# Patient Record
Sex: FEMALE | Race: OTHER | HISPANIC OR LATINO
[De-identification: names, ages, dates, MRNs, and addresses within clinical notes are randomized per-mention and may not be internally consistent; named-entity substitution may affect disease eponyms.]

---

## 2017-01-18 ENCOUNTER — APPOINTMENT (OUTPATIENT)
Dept: ORTHOPEDIC SURGERY | Facility: CLINIC | Age: 52
End: 2017-01-18

## 2017-01-18 VITALS — WEIGHT: 138 LBS | BODY MASS INDEX: 25.4 KG/M2 | HEIGHT: 62 IN

## 2017-01-24 ENCOUNTER — APPOINTMENT (OUTPATIENT)
Dept: ORTHOPEDIC SURGERY | Facility: CLINIC | Age: 52
End: 2017-01-24

## 2017-01-24 DIAGNOSIS — M23.92 UNSPECIFIED INTERNAL DERANGEMENT OF LEFT KNEE: ICD-10-CM

## 2017-01-27 ENCOUNTER — FORM ENCOUNTER (OUTPATIENT)
Age: 52
End: 2017-01-27

## 2017-01-28 ENCOUNTER — OUTPATIENT (OUTPATIENT)
Dept: OUTPATIENT SERVICES | Facility: HOSPITAL | Age: 52
LOS: 1 days | End: 2017-01-28
Payer: COMMERCIAL

## 2017-01-28 ENCOUNTER — APPOINTMENT (OUTPATIENT)
Dept: MRI IMAGING | Facility: CLINIC | Age: 52
End: 2017-01-28

## 2017-01-28 DIAGNOSIS — Z00.8 ENCOUNTER FOR OTHER GENERAL EXAMINATION: ICD-10-CM

## 2017-01-28 PROCEDURE — 73721 MRI JNT OF LWR EXTRE W/O DYE: CPT

## 2017-02-10 ENCOUNTER — APPOINTMENT (OUTPATIENT)
Dept: ORTHOPEDIC SURGERY | Facility: CLINIC | Age: 52
End: 2017-02-10

## 2017-03-08 ENCOUNTER — APPOINTMENT (OUTPATIENT)
Dept: NEUROLOGY | Facility: CLINIC | Age: 52
End: 2017-03-08

## 2017-03-20 ENCOUNTER — APPOINTMENT (OUTPATIENT)
Dept: ORTHOPEDIC SURGERY | Facility: CLINIC | Age: 52
End: 2017-03-20

## 2017-04-05 ENCOUNTER — APPOINTMENT (OUTPATIENT)
Dept: ORTHOPEDIC SURGERY | Facility: CLINIC | Age: 52
End: 2017-04-05

## 2017-04-28 ENCOUNTER — APPOINTMENT (OUTPATIENT)
Dept: ORTHOPEDIC SURGERY | Facility: CLINIC | Age: 52
End: 2017-04-28

## 2017-05-05 ENCOUNTER — APPOINTMENT (OUTPATIENT)
Dept: ORTHOPEDIC SURGERY | Facility: CLINIC | Age: 52
End: 2017-05-05

## 2017-05-05 VITALS
WEIGHT: 138 LBS | BODY MASS INDEX: 25.4 KG/M2 | DIASTOLIC BLOOD PRESSURE: 87 MMHG | HEIGHT: 62 IN | SYSTOLIC BLOOD PRESSURE: 137 MMHG | HEART RATE: 87 BPM

## 2017-05-11 ENCOUNTER — APPOINTMENT (OUTPATIENT)
Dept: ORTHOPEDIC SURGERY | Facility: CLINIC | Age: 52
End: 2017-05-11

## 2017-05-11 VITALS
BODY MASS INDEX: 25.4 KG/M2 | SYSTOLIC BLOOD PRESSURE: 135 MMHG | DIASTOLIC BLOOD PRESSURE: 82 MMHG | HEIGHT: 62 IN | WEIGHT: 138 LBS

## 2017-05-15 ENCOUNTER — APPOINTMENT (OUTPATIENT)
Dept: ORTHOPEDIC SURGERY | Facility: CLINIC | Age: 52
End: 2017-05-15

## 2017-05-15 ENCOUNTER — RX RENEWAL (OUTPATIENT)
Age: 52
End: 2017-05-15

## 2017-05-19 ENCOUNTER — OUTPATIENT (OUTPATIENT)
Dept: OUTPATIENT SERVICES | Facility: HOSPITAL | Age: 52
LOS: 1 days | End: 2017-05-19
Payer: COMMERCIAL

## 2017-05-19 DIAGNOSIS — Z51.89 ENCOUNTER FOR OTHER SPECIFIED AFTERCARE: ICD-10-CM

## 2017-05-19 DIAGNOSIS — M75.81 OTHER SHOULDER LESIONS, RIGHT SHOULDER: ICD-10-CM

## 2017-07-12 ENCOUNTER — APPOINTMENT (OUTPATIENT)
Dept: MAMMOGRAPHY | Facility: CLINIC | Age: 52
End: 2017-07-12

## 2017-07-12 ENCOUNTER — OUTPATIENT (OUTPATIENT)
Dept: OUTPATIENT SERVICES | Facility: HOSPITAL | Age: 52
LOS: 1 days | End: 2017-07-12
Payer: COMMERCIAL

## 2017-07-12 ENCOUNTER — RESULT REVIEW (OUTPATIENT)
Age: 52
End: 2017-07-12

## 2017-07-12 DIAGNOSIS — Z00.8 ENCOUNTER FOR OTHER GENERAL EXAMINATION: ICD-10-CM

## 2017-07-12 PROCEDURE — 77065 DX MAMMO INCL CAD UNI: CPT

## 2017-07-12 PROCEDURE — G0279: CPT

## 2017-07-12 PROCEDURE — 19081 BX BREAST 1ST LESION STRTCTC: CPT

## 2017-07-20 PROCEDURE — 97010 HOT OR COLD PACKS THERAPY: CPT

## 2017-07-20 PROCEDURE — 97140 MANUAL THERAPY 1/> REGIONS: CPT

## 2017-07-20 PROCEDURE — 97750 PHYSICAL PERFORMANCE TEST: CPT

## 2017-07-20 PROCEDURE — 97110 THERAPEUTIC EXERCISES: CPT

## 2018-04-10 ENCOUNTER — APPOINTMENT (OUTPATIENT)
Dept: ORTHOPEDIC SURGERY | Facility: CLINIC | Age: 53
End: 2018-04-10
Payer: COMMERCIAL

## 2018-04-10 VITALS
DIASTOLIC BLOOD PRESSURE: 83 MMHG | HEIGHT: 62 IN | HEART RATE: 80 BPM | SYSTOLIC BLOOD PRESSURE: 136 MMHG | WEIGHT: 138 LBS | BODY MASS INDEX: 25.4 KG/M2

## 2018-04-10 DIAGNOSIS — M75.81 OTHER SHOULDER LESIONS, RIGHT SHOULDER: ICD-10-CM

## 2018-04-10 PROCEDURE — 20610 DRAIN/INJ JOINT/BURSA W/O US: CPT | Mod: RT

## 2018-04-10 PROCEDURE — 99213 OFFICE O/P EST LOW 20 MIN: CPT | Mod: 25

## 2018-04-13 ENCOUNTER — APPOINTMENT (OUTPATIENT)
Dept: ORTHOPEDIC SURGERY | Facility: CLINIC | Age: 53
End: 2018-04-13
Payer: COMMERCIAL

## 2018-04-13 PROCEDURE — 99214 OFFICE O/P EST MOD 30 MIN: CPT

## 2018-04-27 ENCOUNTER — APPOINTMENT (OUTPATIENT)
Dept: MAMMOGRAPHY | Facility: CLINIC | Age: 53
End: 2018-04-27
Payer: COMMERCIAL

## 2018-04-27 ENCOUNTER — OUTPATIENT (OUTPATIENT)
Dept: OUTPATIENT SERVICES | Facility: HOSPITAL | Age: 53
LOS: 1 days | End: 2018-04-27
Payer: COMMERCIAL

## 2018-04-27 DIAGNOSIS — Z00.8 ENCOUNTER FOR OTHER GENERAL EXAMINATION: ICD-10-CM

## 2018-04-27 PROCEDURE — G0279: CPT | Mod: 26

## 2018-04-27 PROCEDURE — 76642 ULTRASOUND BREAST LIMITED: CPT

## 2018-04-27 PROCEDURE — 77065 DX MAMMO INCL CAD UNI: CPT

## 2018-04-27 PROCEDURE — 76642 ULTRASOUND BREAST LIMITED: CPT | Mod: 26,RT

## 2018-04-27 PROCEDURE — 77065 DX MAMMO INCL CAD UNI: CPT | Mod: 26,RT

## 2018-04-27 PROCEDURE — G0279: CPT

## 2018-05-21 ENCOUNTER — OUTPATIENT (OUTPATIENT)
Dept: OUTPATIENT SERVICES | Facility: HOSPITAL | Age: 53
LOS: 1 days | End: 2018-05-21
Payer: COMMERCIAL

## 2018-05-21 ENCOUNTER — RESULT REVIEW (OUTPATIENT)
Age: 53
End: 2018-05-21

## 2018-05-21 ENCOUNTER — APPOINTMENT (OUTPATIENT)
Dept: ULTRASOUND IMAGING | Facility: CLINIC | Age: 53
End: 2018-05-21
Payer: COMMERCIAL

## 2018-05-21 DIAGNOSIS — Z00.8 ENCOUNTER FOR OTHER GENERAL EXAMINATION: ICD-10-CM

## 2018-05-21 PROCEDURE — 77065 DX MAMMO INCL CAD UNI: CPT

## 2018-05-21 PROCEDURE — 19083 BX BREAST 1ST LESION US IMAG: CPT

## 2018-05-21 PROCEDURE — 77065 DX MAMMO INCL CAD UNI: CPT | Mod: 26,RT

## 2018-05-21 PROCEDURE — 19083 BX BREAST 1ST LESION US IMAG: CPT | Mod: RT

## 2018-05-29 ENCOUNTER — APPOINTMENT (OUTPATIENT)
Dept: GASTROENTEROLOGY | Facility: CLINIC | Age: 53
End: 2018-05-29
Payer: COMMERCIAL

## 2018-05-29 VITALS
BODY MASS INDEX: 26.13 KG/M2 | WEIGHT: 142 LBS | DIASTOLIC BLOOD PRESSURE: 82 MMHG | SYSTOLIC BLOOD PRESSURE: 123 MMHG | HEART RATE: 80 BPM | HEIGHT: 62 IN

## 2018-05-29 DIAGNOSIS — Z82.5 FAMILY HISTORY OF ASTHMA AND OTHER CHRONIC LOWER RESPIRATORY DISEASES: ICD-10-CM

## 2018-05-29 PROCEDURE — 99204 OFFICE O/P NEW MOD 45 MIN: CPT

## 2018-05-29 PROCEDURE — 82272 OCCULT BLD FECES 1-3 TESTS: CPT

## 2018-07-10 ENCOUNTER — APPOINTMENT (OUTPATIENT)
Dept: SURGERY | Facility: CLINIC | Age: 53
End: 2018-07-10
Payer: MEDICARE

## 2018-07-10 VITALS
TEMPERATURE: 99 F | SYSTOLIC BLOOD PRESSURE: 126 MMHG | HEIGHT: 62 IN | BODY MASS INDEX: 27.23 KG/M2 | OXYGEN SATURATION: 95 % | HEART RATE: 97 BPM | WEIGHT: 148 LBS | DIASTOLIC BLOOD PRESSURE: 81 MMHG

## 2018-07-10 DIAGNOSIS — N63.10 UNSPECIFIED LUMP IN THE RIGHT BREAST, UNSPECIFIED QUADRANT: ICD-10-CM

## 2018-07-10 PROCEDURE — 99204 OFFICE O/P NEW MOD 45 MIN: CPT

## 2018-07-13 PROBLEM — N63.10 MASS OF BREAST, RIGHT: Status: ACTIVE | Noted: 2018-07-13

## 2018-07-26 ENCOUNTER — RX RENEWAL (OUTPATIENT)
Age: 53
End: 2018-07-26

## 2018-08-09 ENCOUNTER — APPOINTMENT (OUTPATIENT)
Dept: GASTROENTEROLOGY | Facility: GI CENTER | Age: 53
End: 2018-08-09
Payer: MEDICARE

## 2018-08-09 ENCOUNTER — OUTPATIENT (OUTPATIENT)
Dept: OUTPATIENT SERVICES | Facility: HOSPITAL | Age: 53
LOS: 1 days | End: 2018-08-09
Payer: MEDICARE

## 2018-08-09 VITALS
HEIGHT: 62 IN | TEMPERATURE: 98 F | WEIGHT: 147 LBS | DIASTOLIC BLOOD PRESSURE: 88 MMHG | RESPIRATION RATE: 12 BRPM | BODY MASS INDEX: 27.05 KG/M2 | HEART RATE: 100 BPM | SYSTOLIC BLOOD PRESSURE: 128 MMHG

## 2018-08-09 DIAGNOSIS — R10.32 LEFT LOWER QUADRANT PAIN: ICD-10-CM

## 2018-08-09 DIAGNOSIS — K64.8 OTHER HEMORRHOIDS: ICD-10-CM

## 2018-08-09 DIAGNOSIS — G89.29 LEFT LOWER QUADRANT PAIN: ICD-10-CM

## 2018-08-09 PROCEDURE — 45378 DIAGNOSTIC COLONOSCOPY: CPT

## 2018-08-09 PROCEDURE — T1013: CPT

## 2018-10-22 ENCOUNTER — APPOINTMENT (OUTPATIENT)
Dept: GASTROENTEROLOGY | Facility: GI CENTER | Age: 53
End: 2018-10-22
Payer: MEDICARE

## 2018-10-22 ENCOUNTER — RESULT REVIEW (OUTPATIENT)
Age: 53
End: 2018-10-22

## 2018-10-22 ENCOUNTER — OUTPATIENT (OUTPATIENT)
Dept: OUTPATIENT SERVICES | Facility: HOSPITAL | Age: 53
LOS: 1 days | End: 2018-10-22
Payer: MEDICARE

## 2018-10-22 VITALS
DIASTOLIC BLOOD PRESSURE: 80 MMHG | WEIGHT: 147 LBS | TEMPERATURE: 98 F | HEART RATE: 100 BPM | SYSTOLIC BLOOD PRESSURE: 130 MMHG | HEIGHT: 62 IN | BODY MASS INDEX: 27.05 KG/M2 | RESPIRATION RATE: 12 BRPM

## 2018-10-22 DIAGNOSIS — K29.00 ACUTE GASTRITIS W/OUT BLEEDING: ICD-10-CM

## 2018-10-22 DIAGNOSIS — K21.9 GASTRO-ESOPHAGEAL REFLUX DISEASE WITHOUT ESOPHAGITIS: ICD-10-CM

## 2018-10-22 PROCEDURE — 88305 TISSUE EXAM BY PATHOLOGIST: CPT

## 2018-10-22 PROCEDURE — 43239 EGD BIOPSY SINGLE/MULTIPLE: CPT

## 2018-10-22 PROCEDURE — 88342 IMHCHEM/IMCYTCHM 1ST ANTB: CPT

## 2018-10-22 PROCEDURE — 88305 TISSUE EXAM BY PATHOLOGIST: CPT | Mod: 26

## 2018-10-22 PROCEDURE — 88342 IMHCHEM/IMCYTCHM 1ST ANTB: CPT | Mod: 26

## 2018-10-22 RX ORDER — POLYETHYLENE GLYOCOL 3350, SODIUM CHLORIDE, SODIUM BICARBONATE AND POTASSIUM CHLORIDE 420; 11.2; 5.72; 1.48 G/4L; G/4L; G/4L; G/4L
420 POWDER, FOR SOLUTION NASOGASTRIC; ORAL
Qty: 1 | Refills: 0 | Status: DISCONTINUED | COMMUNITY
Start: 2018-05-29 | End: 2018-10-22

## 2018-10-25 LAB — SURGICAL PATHOLOGY FINAL REPORT - CH: SIGNIFICANT CHANGE UP

## 2020-11-18 ENCOUNTER — APPOINTMENT (OUTPATIENT)
Dept: PULMONOLOGY | Facility: CLINIC | Age: 55
End: 2020-11-18
Payer: MEDICARE

## 2020-11-18 VITALS
DIASTOLIC BLOOD PRESSURE: 80 MMHG | HEIGHT: 62 IN | HEART RATE: 87 BPM | OXYGEN SATURATION: 90 % | SYSTOLIC BLOOD PRESSURE: 140 MMHG | WEIGHT: 143 LBS | BODY MASS INDEX: 26.31 KG/M2

## 2020-11-18 VITALS — OXYGEN SATURATION: 96 %

## 2020-11-18 DIAGNOSIS — Z87.891 PERSONAL HISTORY OF NICOTINE DEPENDENCE: ICD-10-CM

## 2020-11-18 DIAGNOSIS — Z82.49 FAMILY HISTORY OF ISCHEMIC HEART DISEASE AND OTHER DISEASES OF THE CIRCULATORY SYSTEM: ICD-10-CM

## 2020-11-18 PROCEDURE — 99204 OFFICE O/P NEW MOD 45 MIN: CPT

## 2020-11-18 NOTE — PHYSICAL EXAM
[No Acute Distress] : no acute distress [Normal Oropharynx] : normal oropharynx [Normal Appearance] : normal appearance [No Neck Mass] : no neck mass [Normal Rate/Rhythm] : normal rate/rhythm [Normal S1, S2] : normal s1, s2 [No Murmurs] : no murmurs [No Resp Distress] : no resp distress [No Acc Muscle Use] : no acc muscle use [Normal Rhythm and Effort] : normal rhythm and effort [Clear to Auscultation Bilaterally] : clear to auscultation bilaterally [No Abnormalities] : no abnormalities [Benign] : benign [Normal Gait] : normal gait [No Clubbing] : no clubbing [No Cyanosis] : no cyanosis [No Edema] : no edema [FROM] : FROM [Normal Color/ Pigmentation] : normal color/ pigmentation [No Focal Deficits] : no focal deficits [Oriented x3] : oriented x3 [Normal Affect] : normal affect

## 2020-11-18 NOTE — CONSULT LETTER
[Dear  ___] : Dear  [unfilled], [Consult Letter:] : I had the pleasure of evaluating your patient, [unfilled]. [Please see my note below.] : Please see my note below. [Consult Closing:] : Thank you very much for allowing me to participate in the care of this patient.  If you have any questions, please do not hesitate to contact me. [Sincerely,] : Sincerely, [FreeTextEntry3] : Rivera Juan MD\par

## 2020-11-19 ENCOUNTER — APPOINTMENT (OUTPATIENT)
Dept: RADIOLOGY | Facility: CLINIC | Age: 55
End: 2020-11-19
Payer: MEDICARE

## 2020-11-19 ENCOUNTER — OUTPATIENT (OUTPATIENT)
Dept: OUTPATIENT SERVICES | Facility: HOSPITAL | Age: 55
LOS: 1 days | End: 2020-11-19
Payer: SELF-PAY

## 2020-11-19 DIAGNOSIS — Z00.8 ENCOUNTER FOR OTHER GENERAL EXAMINATION: ICD-10-CM

## 2020-11-19 DIAGNOSIS — R06.00 DYSPNEA, UNSPECIFIED: ICD-10-CM

## 2020-11-19 PROCEDURE — 71046 X-RAY EXAM CHEST 2 VIEWS: CPT | Mod: 26

## 2020-11-19 PROCEDURE — 71046 X-RAY EXAM CHEST 2 VIEWS: CPT

## 2020-12-26 ENCOUNTER — APPOINTMENT (OUTPATIENT)
Dept: DISASTER EMERGENCY | Facility: CLINIC | Age: 55
End: 2020-12-26

## 2020-12-29 ENCOUNTER — APPOINTMENT (OUTPATIENT)
Dept: PULMONOLOGY | Facility: CLINIC | Age: 55
End: 2020-12-29
Payer: MEDICARE

## 2020-12-29 VITALS — OXYGEN SATURATION: 93 % | HEART RATE: 65 BPM | SYSTOLIC BLOOD PRESSURE: 140 MMHG | DIASTOLIC BLOOD PRESSURE: 79 MMHG

## 2020-12-29 VITALS — HEIGHT: 61 IN | BODY MASS INDEX: 26.06 KG/M2 | WEIGHT: 138 LBS

## 2020-12-29 LAB — SARS-COV-2 N GENE NPH QL NAA+PROBE: NOT DETECTED

## 2020-12-29 PROCEDURE — 85018 HEMOGLOBIN: CPT | Mod: QW

## 2020-12-29 PROCEDURE — 99214 OFFICE O/P EST MOD 30 MIN: CPT | Mod: 25

## 2020-12-29 PROCEDURE — 94727 GAS DIL/WSHOT DETER LNG VOL: CPT

## 2020-12-29 PROCEDURE — 99072 ADDL SUPL MATRL&STAF TM PHE: CPT

## 2020-12-29 PROCEDURE — 94729 DIFFUSING CAPACITY: CPT

## 2020-12-29 PROCEDURE — 94010 BREATHING CAPACITY TEST: CPT

## 2020-12-29 RX ORDER — CEFUROXIME AXETIL 500 MG/1
500 TABLET ORAL
Qty: 14 | Refills: 0 | Status: DISCONTINUED | COMMUNITY
Start: 2020-11-19 | End: 2020-12-29

## 2020-12-29 NOTE — REASON FOR VISIT
[Follow-Up] : a follow-up visit [Abnormal CXR/ Chest CT] : an abnormal CXR/ chest CT [Cough] : cough [Shortness of Breath] : shortness of breath

## 2021-01-13 LAB
ACE BLD-CCNC: 75 U/L
ANA PAT FLD IF-IMP: ABNORMAL
ANA SER IF-ACNC: ABNORMAL
CRP SERPL-MCNC: 0.3 MG/DL
ERYTHROCYTE [SEDIMENTATION RATE] IN BLOOD BY WESTERGREN METHOD: 42 MM/HR
RHEUMATOID FACT SER QL: 73 IU/ML
SARS-COV-2 IGG SERPL IA-ACNC: <0.1 INDEX
SARS-COV-2 IGG SERPL QL IA: NEGATIVE

## 2021-01-16 LAB
ALTERN TENCAPG(M6): 15.5 MCG/ML
ANCA AB SER-IMP: ABNORMAL
ASPER FUMCAPG(M3): 40.1 MCG/ML
AUREOBASCAPG(M12): 26.4 MCG/ML
C-ANCA SER-ACNC: NEGATIVE
ENA JO1 AB SER IA-ACNC: <0.2 AL
MICROPOLYCAPG(M22): 4.1 MCG/ML
P-ANCA TITR SER IF: NEGATIVE
PENIC CHRYCAPG(M1): 51.7 MCG/ML
PHOMA BETAE IGG: 9.3 MCG/ML
THERMOCAPG(M23): 13.9 MCG/ML
TOTAL IGE SMQN RAST: 212 KU/L
TRICHODERMA VIRIDE IGG: 7.9 MCG/ML

## 2021-01-18 ENCOUNTER — APPOINTMENT (OUTPATIENT)
Dept: GASTROENTEROLOGY | Facility: CLINIC | Age: 56
End: 2021-01-18
Payer: MEDICARE

## 2021-01-18 VITALS
TEMPERATURE: 98.2 F | DIASTOLIC BLOOD PRESSURE: 91 MMHG | WEIGHT: 142 LBS | HEIGHT: 61 IN | HEART RATE: 87 BPM | BODY MASS INDEX: 26.81 KG/M2 | SYSTOLIC BLOOD PRESSURE: 114 MMHG

## 2021-01-18 PROCEDURE — 99214 OFFICE O/P EST MOD 30 MIN: CPT

## 2021-01-18 PROCEDURE — 99072 ADDL SUPL MATRL&STAF TM PHE: CPT

## 2021-01-18 NOTE — ASSESSMENT
[FreeTextEntry1] : Impression: Chronic GERD suboptimally controlled with omeprazole 40 mg once daily. Patient has nighttime reflux symptoms.\par \par Recommendations: Will add famotidine 40 mg q.h.s. to current a.m. omeprazole therapy for nighttime acid suppression. She was told to avoid eating right before sleep and least 3-4 hours between eating and lying flat. A low-fat antireflux diet was also recommended and explained to the patient along with chronic calcium supplementation with chronic PPI therapy. She was advised to return here in 3 months time for followup evaluation and appeared to understand all of the above instructions, information, and management plan which were explained to her today in Korean by myself who speaks Korean.

## 2021-01-18 NOTE — PHYSICAL EXAM
[General Appearance - Alert] : alert [General Appearance - In No Acute Distress] : in no acute distress [General Appearance - Well Nourished] : well nourished [General Appearance - Well Developed] : well developed [General Appearance - Well-Appearing] : healthy appearing [Sclera] : the sclera and conjunctiva were normal [PERRL With Normal Accommodation] : pupils were equal in size, round, and reactive to light [Extraocular Movements] : extraocular movements were intact [Outer Ear] : the ears and nose were normal in appearance [Examination Of The Oral Cavity] : the lips and gums were normal [Oropharynx] : the oropharynx was normal [Neck Appearance] : the appearance of the neck was normal [Neck Cervical Mass (___cm)] : no neck mass was observed [Jugular Venous Distention Increased] : there was no jugular-venous distention [Thyroid Diffuse Enlargement] : the thyroid was not enlarged [Thyroid Nodule] : there were no palpable thyroid nodules [Heart Rate And Rhythm] : heart rate was normal and rhythm regular [Auscultation Breath Sounds / Voice Sounds] : lungs were clear to auscultation bilaterally [Heart Sounds] : normal S1 and S2 [Heart Sounds Gallop] : no gallops [Murmurs] : no murmurs [Heart Sounds Pericardial Friction Rub] : no pericardial rub [Bowel Sounds] : normal bowel sounds [Abdomen Soft] : soft [] : no hepato-splenomegaly [Abdomen Mass (___ Cm)] : no abdominal mass palpated [LLQ] : in the left lower quadrant [Normal Sphincter Tone] : normal sphincter tone [No Rectal Mass] : no rectal mass [No CVA Tenderness] : no ~M costovertebral angle tenderness [Abnormal Walk] : normal gait [Musculoskeletal - Swelling] : no joint swelling seen [Skin Color & Pigmentation] : normal skin color and pigmentation [Oriented To Time, Place, And Person] : oriented to person, place, and time [Skin Turgor] : normal skin turgor [Epigastric] : not in the epigastric area [Periumbilical] : not in the periumbilical area [Suprapubic] : not in the suprapubic area [RUQ] : not in the in the right upper quadrant [RLQ] : not in the right lower quadrant [LUQ] : not in the left upper quadrant [Internal Hemorrhoid] : no internal hemorrhoids [External Hemorrhoid] : no external hemorrhoids [Occult Blood Positive] : stool was negative for occult blood [FreeTextEntry1] : the exam was chaperoned by Christine

## 2021-01-18 NOTE — HISTORY OF PRESENT ILLNESS
[None] : had no significant interval events [Heartburn] : heartburn worsened [Nausea] : denies nausea [Vomiting] : denies vomiting [Diarrhea] : denies diarrhea [Constipation] : denies constipation [Yellow Skin Or Eyes (Jaundice)] : denies jaundice [Abdominal Pain] : denies abdominal pain [Rectal Pain] : denies rectal pain [Abdominal Swelling] : denies abdominal swelling [GERD] : gastroesophageal reflux disease [Abdominal Surgery] : abdominal surgery [Wt Gain ___ Lbs] : no recent weight gain [Wt Loss ___ Lbs] : no recent weight loss [Hiatus Hernia] : no hiatus hernia [Peptic Ulcer Disease] : no peptic ulcer disease [Pancreatitis] : no pancreatitis [Cholelithiasis] : no cholelithiasis [Kidney Stone] : no kidney stone [Inflammatory Bowel Disease] : no inflammatory bowel disease [Irritable Bowel Syndrome] : no irritable bowel syndrome [Diverticulitis] : no diverticulitis [Alcohol Abuse] : no alcohol abuse [Malignancy] : no malignancy [Appendectomy] : no appendectomy [Cholecystectomy] : no cholecystectomy [de-identified] : 2018 [de-identified] : EGD with biopsy and colonoscopy [de-identified] : Patient presents for followup evaluation of chronic GERD status post upper endoscopy with biopsy done in 2018 which showed gastritis and duodenitis without esophagitis with negative gastric biopsies for H. pylori and negative duodenal biopsies for celiac disease. She is also status post a negative screening colonoscopy except for internal hemorrhoids also done in 2018. She presently on omeprazole 40 mg once daily for reflux and apparently suffers from restrictive lung disease which is thought to be somehow exacerbated by acid reflux. She does admit to nighttime reflux symptoms.

## 2021-01-21 ENCOUNTER — APPOINTMENT (OUTPATIENT)
Dept: CT IMAGING | Facility: CLINIC | Age: 56
End: 2021-01-21
Payer: MEDICARE

## 2021-01-21 ENCOUNTER — OUTPATIENT (OUTPATIENT)
Dept: OUTPATIENT SERVICES | Facility: HOSPITAL | Age: 56
LOS: 1 days | End: 2021-01-21

## 2021-01-21 DIAGNOSIS — R91.8 OTHER NONSPECIFIC ABNORMAL FINDING OF LUNG FIELD: ICD-10-CM

## 2021-01-21 PROCEDURE — 71250 CT THORAX DX C-: CPT | Mod: 26

## 2021-01-22 ENCOUNTER — APPOINTMENT (OUTPATIENT)
Dept: DISASTER EMERGENCY | Facility: CLINIC | Age: 56
End: 2021-01-22

## 2021-01-25 LAB — SARS-COV-2 N GENE NPH QL NAA+PROBE: NOT DETECTED

## 2021-01-26 ENCOUNTER — APPOINTMENT (OUTPATIENT)
Dept: PULMONOLOGY | Facility: CLINIC | Age: 56
End: 2021-01-26
Payer: MEDICARE

## 2021-01-26 VITALS
HEIGHT: 62 IN | HEART RATE: 91 BPM | BODY MASS INDEX: 25.95 KG/M2 | OXYGEN SATURATION: 92 % | WEIGHT: 141 LBS | RESPIRATION RATE: 14 BRPM | DIASTOLIC BLOOD PRESSURE: 80 MMHG | TEMPERATURE: 98 F | SYSTOLIC BLOOD PRESSURE: 110 MMHG

## 2021-01-26 VITALS — WEIGHT: 142 LBS | TEMPERATURE: 98.2 F | HEIGHT: 61 IN | BODY MASS INDEX: 26.81 KG/M2

## 2021-01-26 DIAGNOSIS — Z87.09 PERSONAL HISTORY OF OTHER DISEASES OF THE RESPIRATORY SYSTEM: ICD-10-CM

## 2021-01-26 PROCEDURE — 94010 BREATHING CAPACITY TEST: CPT

## 2021-01-26 PROCEDURE — 99072 ADDL SUPL MATRL&STAF TM PHE: CPT

## 2021-01-26 PROCEDURE — 99214 OFFICE O/P EST MOD 30 MIN: CPT | Mod: 25

## 2021-01-26 NOTE — REASON FOR VISIT
[Follow-Up] : a follow-up visit [Cough] : cough [Shortness of Breath] : shortness of breath [ILD] : ILD

## 2021-02-11 ENCOUNTER — APPOINTMENT (OUTPATIENT)
Dept: RHEUMATOLOGY | Facility: CLINIC | Age: 56
End: 2021-02-11
Payer: MEDICARE

## 2021-02-11 VITALS
OXYGEN SATURATION: 96 % | WEIGHT: 142 LBS | HEIGHT: 62 IN | SYSTOLIC BLOOD PRESSURE: 155 MMHG | BODY MASS INDEX: 26.13 KG/M2 | HEART RATE: 72 BPM | DIASTOLIC BLOOD PRESSURE: 84 MMHG

## 2021-02-11 DIAGNOSIS — M54.2 CERVICALGIA: ICD-10-CM

## 2021-02-11 DIAGNOSIS — K29.80 DUODENITIS W/OUT BLEEDING: ICD-10-CM

## 2021-02-11 DIAGNOSIS — M50.30 OTHER CERVICAL DISC DEGENERATION, UNSPECIFIED CERVICAL REGION: ICD-10-CM

## 2021-02-11 PROCEDURE — 99205 OFFICE O/P NEW HI 60 MIN: CPT | Mod: 25

## 2021-02-11 PROCEDURE — 99072 ADDL SUPL MATRL&STAF TM PHE: CPT

## 2021-02-11 NOTE — ASSESSMENT
[FreeTextEntry1] : 55y F with ?weak dx of SLE in the past not on treatment (likely reported +CURLY history) p/w new diagnosis of ILD w/ bilateral GGO on CT imaging and restrictive PFT pattern. She has laboratory abnormalities with slight mild-moderate RF elevation which may be 2/2 her lung disease. She also has a high positive titer CURLY 1:1280 speckled pattern for which she needs additional serologies. Systemic inflammatory autoimmune rheumatic disease such as MCTD, Sjögren's, SLE or much less likely idiopathic inflammatory myositis, vasculitis, or sarcoidosis.  I recommend full serologies at this time.\par \par - labs as ordered\par \par Depending on results, patient may have need to follow-up\par Will call with results. Myomarker panel typically takes up to 3 weeks for results.

## 2021-02-11 NOTE — PHYSICAL EXAM
[General Appearance - Alert] : alert [General Appearance - In No Acute Distress] : in no acute distress [Sclera] : the sclera and conjunctiva were normal [PERRL With Normal Accommodation] : pupils were equal in size, round, and reactive to light [Extraocular Movements] : extraocular movements were intact [Outer Ear] : the ears and nose were normal in appearance [Hearing Threshold Finger Rub Not [place]] : hearing was normal [Examination Of The Oral Cavity] : the lips and gums were normal [Nasal Cavity] : the nasal mucosa and septum were normal [Oropharynx] : the oropharynx was normal [Neck Appearance] : the appearance of the neck was normal [Neck Cervical Mass (___cm)] : no neck mass was observed [Jugular Venous Distention Increased] : there was no jugular-venous distention [Thyroid Diffuse Enlargement] : the thyroid was not enlarged [Thyroid Nodule] : there were no palpable thyroid nodules [Respiration, Rhythm And Depth] : normal respiratory rhythm and effort [Auscultation Breath Sounds / Voice Sounds] : lungs were clear to auscultation bilaterally [Heart Rate And Rhythm] : heart rate was normal and rhythm regular [Heart Sounds] : normal S1 and S2 [Heart Sounds Gallop] : no gallops [Murmurs] : no murmurs [Heart Sounds Pericardial Friction Rub] : no pericardial rub [Full Pulse] : the pedal pulses are present [Edema] : there was no peripheral edema [Bowel Sounds] : normal bowel sounds [Abdomen Soft] : soft [Abdomen Tenderness] : non-tender [Abdomen Mass (___ Cm)] : no abdominal mass palpated [Cervical Lymph Nodes Enlarged Posterior Bilaterally] : posterior cervical [Cervical Lymph Nodes Enlarged Anterior Bilaterally] : anterior cervical [Supraclavicular Lymph Nodes Enlarged Bilaterally] : supraclavicular [No CVA Tenderness] : no ~M costovertebral angle tenderness [No Spinal Tenderness] : no spinal tenderness [Abnormal Walk] : normal gait [Nail Clubbing] : no clubbing  or cyanosis of the fingernails [Motor Tone] : muscle strength and tone were normal [Musculoskeletal - Swelling] : no joint swelling seen [FreeTextEntry1] : \par Hands- normal\par Wrists- normal\par Elbows- normal\par Shoulders- normal\par Hips- normal\par Knees- Patellofemoral crepitus bilaterally without effusion. \par Ankles- normal\par Feet- normal\par MMT 10/10 proximally/distally bilaterally  [Skin Color & Pigmentation] : normal skin color and pigmentation [Skin Turgor] : normal skin turgor [] : no rash [Skin Lesions] : no skin lesions [Deep Tendon Reflexes (DTR)] : deep tendon reflexes were 2+ and symmetric [Sensation] : the sensory exam was normal to light touch and pinprick [Motor Exam] : the motor exam was normal [No Focal Deficits] : no focal deficits [Oriented To Time, Place, And Person] : oriented to person, place, and time [Affect] : the affect was normal [Impaired Insight] : insight and judgment were intact [Mood] : the mood was normal

## 2021-02-11 NOTE — HISTORY OF PRESENT ILLNESS
[FreeTextEntry1] : 55y F referred from pulmonology Dr. Juan for abnormal lab results in the of new diagnosis of ILD\par \par - reports possible ?SLE diagnosis in the past of SLE, but not able to substantiate much information. She was never treated w/ meds/DMARDs for this\par - recently had reports of congestion and SOB/CALVIN, worse w/ supine position. She has a former history of light cigarette smoking, last used 1 year ago and has since been tobacco-free. She was treated w/ abx but remained symptomatic, CXR w/ bilateral lower lobe infiltrates, ?aspiration vs others. CT chest chest w/ bilateral GGO, labs w/ moderate RF, CURLY 1:1280, elevated ESR. ANCA negative. CRP normal.\par - she has improved respiratory symptoms w/ prednisone 40mg daily, being managed by pulmonary-\par - ROS: denies constitutional sxs of fever/chills, weight loss, alopecia, oral/nasal ulcers, sicca sxs, hematuria/frothy urine, myalgias, arthralgias/arthritis, Raynaud's, rash, nodules, or photosensitivity. \par - FH: negative for autoimmune disease, history of OA in parents\par - No personal or family history of IBD or psoriasis. Denies recent tick/insect bite, UTI, STI, or acute GI illness. \par \par  [de-identified] : \par \par All other ROS negative except as mentioned in HPI.

## 2021-02-27 ENCOUNTER — APPOINTMENT (OUTPATIENT)
Dept: DISASTER EMERGENCY | Facility: CLINIC | Age: 56
End: 2021-02-27

## 2021-02-27 ENCOUNTER — LABORATORY RESULT (OUTPATIENT)
Age: 56
End: 2021-02-27

## 2021-03-03 ENCOUNTER — APPOINTMENT (OUTPATIENT)
Dept: PULMONOLOGY | Facility: CLINIC | Age: 56
End: 2021-03-03
Payer: MEDICARE

## 2021-03-03 VITALS — BODY MASS INDEX: 27.21 KG/M2 | TEMPERATURE: 97.2 F | HEIGHT: 61.5 IN | WEIGHT: 146 LBS

## 2021-03-03 VITALS — SYSTOLIC BLOOD PRESSURE: 128 MMHG | DIASTOLIC BLOOD PRESSURE: 88 MMHG | OXYGEN SATURATION: 94 % | HEART RATE: 102 BPM

## 2021-03-03 DIAGNOSIS — R94.2 ABNORMAL RESULTS OF PULMONARY FUNCTION STUDIES: ICD-10-CM

## 2021-03-03 LAB
CCP AB SER IA-ACNC: <8 UNITS
CENTROMERE IGG SER-ACNC: <0.2 CD:130001892
DSDNA AB SER-ACNC: 32 IU/ML
EJ AB SER QL: NEGATIVE
ENA JO1 AB SER IA-ACNC: <20 UNITS
ENA PM/SCL AB SER-ACNC: <20 UNITS
ENA RNP AB SER IA-ACNC: <0.2 AL
ENA SCL70 IGG SER IA-ACNC: <0.2 AL
ENA SM AB SER IA-ACNC: <0.2 AL
ENA SM+RNP AB SER IA-ACNC: <20 UNITS
ENA SS-A AB SER IA-ACNC: >8 AL
ENA SS-A IGG SER QL: <20 UNITS
ENA SS-B AB SER IA-ACNC: >8 AL
ERYTHROCYTE [SEDIMENTATION RATE] IN BLOOD BY WESTERGREN METHOD: 25 MM/HR
FIBRILLARIN AB SER QL: NEGATIVE
HAV IGM SER QL: NONREACTIVE
HBV CORE IGM SER QL: NONREACTIVE
HBV SURFACE AG SER QL: NONREACTIVE
HCV AB SER QL: NONREACTIVE
HCV S/CO RATIO: 0.17 S/CO
KU AB SER QL: NEGATIVE
M TB IFN-G BLD-IMP: NEGATIVE
MDA-5 (P140)(CADM-140): <20 UNITS
MI2 AB SER QL: NEGATIVE
MYELOPEROXIDASE AB SER QL IA: <5 UNITS
MYELOPEROXIDASE CELLS FLD QL: NEGATIVE
NXP-2 (P140): <20 UNITS
OJ AB SER QL: NEGATIVE
PL12 AB SER QL: NEGATIVE
PL7 AB SER QL: NEGATIVE
PROTEINASE3 AB SER IA-ACNC: <5 UNITS
PROTEINASE3 AB SER-ACNC: NEGATIVE
QUANTIFERON TB PLUS MITOGEN MINUS NIL: 0.89 IU/ML
QUANTIFERON TB PLUS NIL: 0.01 IU/ML
QUANTIFERON TB PLUS TB1 MINUS NIL: 0 IU/ML
QUANTIFERON TB PLUS TB2 MINUS NIL: 0 IU/ML
RF+CCP IGG SER-IMP: NEGATIVE
RIBOSOMAL P AB SER IA-ACNC: <0.2 AL
RNA POLYMERASE III IGG: 4 UNITS
SRP AB SERPL QL: NEGATIVE
TIF GAMMA (P155/140): <20 UNITS
U2 SNRNP AB SER QL: NEGATIVE

## 2021-03-03 PROCEDURE — 94010 BREATHING CAPACITY TEST: CPT

## 2021-03-03 PROCEDURE — 99214 OFFICE O/P EST MOD 30 MIN: CPT | Mod: 25

## 2021-03-03 PROCEDURE — 85018 HEMOGLOBIN: CPT | Mod: QW

## 2021-03-03 PROCEDURE — 94729 DIFFUSING CAPACITY: CPT

## 2021-03-03 PROCEDURE — 94727 GAS DIL/WSHOT DETER LNG VOL: CPT

## 2021-03-03 PROCEDURE — 99072 ADDL SUPL MATRL&STAF TM PHE: CPT

## 2021-03-03 NOTE — REASON FOR VISIT
[Follow-Up] : a follow-up visit [Abnormal CXR/ Chest CT] : an abnormal CXR/ chest CT [Shortness of Breath] : shortness of breath [ILD] : ILD

## 2021-03-05 ENCOUNTER — APPOINTMENT (OUTPATIENT)
Dept: RHEUMATOLOGY | Facility: CLINIC | Age: 56
End: 2021-03-05
Payer: MEDICARE

## 2021-03-05 VITALS
SYSTOLIC BLOOD PRESSURE: 152 MMHG | WEIGHT: 145 LBS | OXYGEN SATURATION: 96 % | DIASTOLIC BLOOD PRESSURE: 94 MMHG | HEART RATE: 80 BPM | BODY MASS INDEX: 27.03 KG/M2 | TEMPERATURE: 98.1 F | HEIGHT: 61.5 IN

## 2021-03-05 PROCEDURE — 99215 OFFICE O/P EST HI 40 MIN: CPT

## 2021-03-05 RX ORDER — DICLOFENAC SODIUM 75 MG/1
75 TABLET, DELAYED RELEASE ORAL
Qty: 60 | Refills: 2 | Status: DISCONTINUED | COMMUNITY
Start: 2017-05-15 | End: 2021-03-05

## 2021-03-05 RX ORDER — OMEPRAZOLE 40 MG/1
40 CAPSULE, DELAYED RELEASE ORAL
Qty: 30 | Refills: 5 | Status: DISCONTINUED | COMMUNITY
Start: 2021-01-18 | End: 2021-03-05

## 2021-03-05 RX ORDER — BENZONATATE 200 MG/1
200 CAPSULE ORAL
Qty: 30 | Refills: 1 | Status: DISCONTINUED | COMMUNITY
Start: 2020-12-29 | End: 2021-03-05

## 2021-03-27 ENCOUNTER — APPOINTMENT (OUTPATIENT)
Dept: CT IMAGING | Facility: CLINIC | Age: 56
End: 2021-03-27
Payer: MEDICARE

## 2021-03-27 ENCOUNTER — OUTPATIENT (OUTPATIENT)
Dept: OUTPATIENT SERVICES | Facility: HOSPITAL | Age: 56
LOS: 1 days | End: 2021-03-27
Payer: COMMERCIAL

## 2021-03-27 DIAGNOSIS — J84.9 INTERSTITIAL PULMONARY DISEASE, UNSPECIFIED: ICD-10-CM

## 2021-03-27 PROCEDURE — 71250 CT THORAX DX C-: CPT

## 2021-03-27 PROCEDURE — 71250 CT THORAX DX C-: CPT | Mod: 26

## 2021-04-06 ENCOUNTER — APPOINTMENT (OUTPATIENT)
Dept: PULMONOLOGY | Facility: CLINIC | Age: 56
End: 2021-04-06
Payer: MEDICARE

## 2021-04-06 VITALS
HEART RATE: 91 BPM | DIASTOLIC BLOOD PRESSURE: 70 MMHG | SYSTOLIC BLOOD PRESSURE: 118 MMHG | TEMPERATURE: 98 F | HEIGHT: 62 IN | OXYGEN SATURATION: 93 % | WEIGHT: 147 LBS | RESPIRATION RATE: 14 BRPM | BODY MASS INDEX: 27.05 KG/M2

## 2021-04-06 PROCEDURE — 99214 OFFICE O/P EST MOD 30 MIN: CPT

## 2021-04-06 PROCEDURE — 99072 ADDL SUPL MATRL&STAF TM PHE: CPT

## 2021-04-09 ENCOUNTER — APPOINTMENT (OUTPATIENT)
Dept: RHEUMATOLOGY | Facility: CLINIC | Age: 56
End: 2021-04-09

## 2021-04-26 ENCOUNTER — APPOINTMENT (OUTPATIENT)
Dept: GASTROENTEROLOGY | Facility: CLINIC | Age: 56
End: 2021-04-26
Payer: MEDICARE

## 2021-04-26 VITALS
WEIGHT: 150 LBS | DIASTOLIC BLOOD PRESSURE: 69 MMHG | HEIGHT: 62 IN | BODY MASS INDEX: 27.6 KG/M2 | TEMPERATURE: 98.2 F | HEART RATE: 82 BPM | SYSTOLIC BLOOD PRESSURE: 128 MMHG

## 2021-04-26 PROCEDURE — 99214 OFFICE O/P EST MOD 30 MIN: CPT

## 2021-04-26 PROCEDURE — 99072 ADDL SUPL MATRL&STAF TM PHE: CPT

## 2021-04-26 RX ORDER — CYCLOSPORINE 0.5 MG/ML
0.05 EMULSION OPHTHALMIC
Qty: 180 | Refills: 0 | Status: ACTIVE | COMMUNITY
Start: 2021-02-27

## 2021-04-26 RX ORDER — TRIAMCINOLONE ACETONIDE 1 MG/G
0.1 CREAM TOPICAL
Qty: 80 | Refills: 0 | Status: ACTIVE | COMMUNITY
Start: 2021-04-13

## 2021-04-26 NOTE — PHYSICAL EXAM
[General Appearance - Alert] : alert [General Appearance - In No Acute Distress] : in no acute distress [General Appearance - Well Nourished] : well nourished [General Appearance - Well Developed] : well developed [General Appearance - Well-Appearing] : healthy appearing [Sclera] : the sclera and conjunctiva were normal [PERRL With Normal Accommodation] : pupils were equal in size, round, and reactive to light [Extraocular Movements] : extraocular movements were intact [Outer Ear] : the ears and nose were normal in appearance [Examination Of The Oral Cavity] : the lips and gums were normal [Oropharynx] : the oropharynx was normal [Neck Appearance] : the appearance of the neck was normal [Neck Cervical Mass (___cm)] : no neck mass was observed [Jugular Venous Distention Increased] : there was no jugular-venous distention [Thyroid Diffuse Enlargement] : the thyroid was not enlarged [Thyroid Nodule] : there were no palpable thyroid nodules [Auscultation Breath Sounds / Voice Sounds] : lungs were clear to auscultation bilaterally [Heart Rate And Rhythm] : heart rate was normal and rhythm regular [Heart Sounds] : normal S1 and S2 [Heart Sounds Gallop] : no gallops [Murmurs] : no murmurs [Heart Sounds Pericardial Friction Rub] : no pericardial rub [Bowel Sounds] : normal bowel sounds [Abdomen Soft] : soft [] : no hepato-splenomegaly [Abdomen Mass (___ Cm)] : no abdominal mass palpated [LLQ] : in the left lower quadrant [Normal Sphincter Tone] : normal sphincter tone [No Rectal Mass] : no rectal mass [No CVA Tenderness] : no ~M costovertebral angle tenderness [Abnormal Walk] : normal gait [Musculoskeletal - Swelling] : no joint swelling seen [Skin Color & Pigmentation] : normal skin color and pigmentation [Skin Turgor] : normal skin turgor [Oriented To Time, Place, And Person] : oriented to person, place, and time [Epigastric] : not in the epigastric area [Periumbilical] : not in the periumbilical area [Suprapubic] : not in the suprapubic area [RUQ] : not in the in the right upper quadrant [RLQ] : not in the right lower quadrant [LUQ] : not in the left upper quadrant [Internal Hemorrhoid] : no internal hemorrhoids [External Hemorrhoid] : no external hemorrhoids [Occult Blood Positive] : stool was negative for occult blood [FreeTextEntry1] : the exam was chaperoned by Christine

## 2021-04-26 NOTE — HISTORY OF PRESENT ILLNESS
[None] : had no significant interval events [Heartburn] : improved heartburn [Nausea] : denies nausea [Vomiting] : denies vomiting [Diarrhea] : denies diarrhea [Constipation] : denies constipation [Yellow Skin Or Eyes (Jaundice)] : denies jaundice [Abdominal Pain] : denies abdominal pain [Abdominal Swelling] : denies abdominal swelling [Rectal Pain] : denies rectal pain [GERD] : gastroesophageal reflux disease [Abdominal Surgery] : abdominal surgery [Wt Gain ___ Lbs] : no recent weight gain [Wt Loss ___ Lbs] : no recent weight loss [Hiatus Hernia] : no hiatus hernia [Peptic Ulcer Disease] : no peptic ulcer disease [Pancreatitis] : no pancreatitis [Cholelithiasis] : no cholelithiasis [Kidney Stone] : no kidney stone [Inflammatory Bowel Disease] : no inflammatory bowel disease [Irritable Bowel Syndrome] : no irritable bowel syndrome [Alcohol Abuse] : no alcohol abuse [Malignancy] : no malignancy [Appendectomy] : no appendectomy [Cholecystectomy] : no cholecystectomy [de-identified] : January 18, 2021 [de-identified] : Prescribing omeprazole and famotidine [de-identified] : Patient presents for follow-up evaluation of chronic GERD symptomatically improved with a combination of omeprazole 40 mg in the morning and famotidine 40 mg nightly which was added to her acid suppression regimen when she was last here in January.  She returns today stating that she is doing very well with no significant breakthrough symptoms or complaints.  She is status post a negative upper endoscopy in 2018.  She also had a negative colonoscopy that same year.

## 2021-04-26 NOTE — ASSESSMENT
[FreeTextEntry1] : Impression: Chronic GERD symptomatically improved with the addition of famotidine 40 mg nightly.\par \par Recommendations: Continue current acid suppression regimen of omeprazole 40 mg in the morning and famotidine 40 mg nightly.  A low-fat antireflux diet with chronic calcium supplementation with chronic PPI therapy was recommended and explained to the patient today in Jamaican by myself who speaks Jamaican.  She was advised to return here in 6 months time for follow-up evaluation or sooner if necessary and appeared to understand all of the above instructions, information, and management plan.

## 2021-05-15 ENCOUNTER — APPOINTMENT (OUTPATIENT)
Dept: DISASTER EMERGENCY | Facility: CLINIC | Age: 56
End: 2021-05-15

## 2021-05-15 ENCOUNTER — LABORATORY RESULT (OUTPATIENT)
Age: 56
End: 2021-05-15

## 2021-05-18 ENCOUNTER — APPOINTMENT (OUTPATIENT)
Dept: PULMONOLOGY | Facility: CLINIC | Age: 56
End: 2021-05-18
Payer: MEDICARE

## 2021-05-18 VITALS — BODY MASS INDEX: 27 KG/M2 | WEIGHT: 143 LBS | TEMPERATURE: 98.4 F | HEIGHT: 61 IN

## 2021-05-18 VITALS
HEART RATE: 74 BPM | SYSTOLIC BLOOD PRESSURE: 110 MMHG | OXYGEN SATURATION: 95 % | RESPIRATION RATE: 14 BRPM | DIASTOLIC BLOOD PRESSURE: 80 MMHG

## 2021-05-18 DIAGNOSIS — R91.8 OTHER NONSPECIFIC ABNORMAL FINDING OF LUNG FIELD: ICD-10-CM

## 2021-05-18 PROCEDURE — 94727 GAS DIL/WSHOT DETER LNG VOL: CPT

## 2021-05-18 PROCEDURE — 85018 HEMOGLOBIN: CPT | Mod: QW

## 2021-05-18 PROCEDURE — 94010 BREATHING CAPACITY TEST: CPT

## 2021-05-18 PROCEDURE — 99214 OFFICE O/P EST MOD 30 MIN: CPT | Mod: 25

## 2021-05-18 PROCEDURE — 94729 DIFFUSING CAPACITY: CPT

## 2021-06-07 ENCOUNTER — APPOINTMENT (OUTPATIENT)
Dept: RHEUMATOLOGY | Facility: CLINIC | Age: 56
End: 2021-06-07
Payer: MEDICARE

## 2021-06-07 VITALS
DIASTOLIC BLOOD PRESSURE: 74 MMHG | BODY MASS INDEX: 27 KG/M2 | HEIGHT: 61 IN | SYSTOLIC BLOOD PRESSURE: 112 MMHG | OXYGEN SATURATION: 99 % | TEMPERATURE: 97.6 F | HEART RATE: 78 BPM | WEIGHT: 143 LBS

## 2021-06-07 PROCEDURE — 99215 OFFICE O/P EST HI 40 MIN: CPT | Mod: 25

## 2021-06-07 RX ORDER — SULFAMETHOXAZOLE AND TRIMETHOPRIM 800; 160 MG/1; MG/1
800-160 TABLET ORAL DAILY
Qty: 12 | Refills: 5 | Status: DISCONTINUED | COMMUNITY
Start: 2021-03-03 | End: 2021-06-07

## 2021-06-07 NOTE — PHYSICAL EXAM
[General Appearance - Alert] : alert [General Appearance - In No Acute Distress] : in no acute distress [Sclera] : the sclera and conjunctiva were normal [PERRL With Normal Accommodation] : pupils were equal in size, round, and reactive to light [Extraocular Movements] : extraocular movements were intact [Outer Ear] : the ears and nose were normal in appearance [Hearing Threshold Finger Rub Not [place]] : hearing was normal [Examination Of The Oral Cavity] : the lips and gums were normal [Nasal Cavity] : the nasal mucosa and septum were normal [Oropharynx] : the oropharynx was normal [Neck Appearance] : the appearance of the neck was normal [Neck Cervical Mass (___cm)] : no neck mass was observed [Jugular Venous Distention Increased] : there was no jugular-venous distention [Thyroid Diffuse Enlargement] : the thyroid was not enlarged [Thyroid Nodule] : there were no palpable thyroid nodules [Respiration, Rhythm And Depth] : normal respiratory rhythm and effort [Auscultation Breath Sounds / Voice Sounds] : lungs were clear to auscultation bilaterally [Heart Rate And Rhythm] : heart rate was normal and rhythm regular [Heart Sounds] : normal S1 and S2 [Heart Sounds Gallop] : no gallops [Murmurs] : no murmurs [Heart Sounds Pericardial Friction Rub] : no pericardial rub [Full Pulse] : the pedal pulses are present [Edema] : there was no peripheral edema [Bowel Sounds] : normal bowel sounds [Abdomen Soft] : soft [Abdomen Tenderness] : non-tender [Abdomen Mass (___ Cm)] : no abdominal mass palpated [Cervical Lymph Nodes Enlarged Posterior Bilaterally] : posterior cervical [Cervical Lymph Nodes Enlarged Anterior Bilaterally] : anterior cervical [Supraclavicular Lymph Nodes Enlarged Bilaterally] : supraclavicular [No CVA Tenderness] : no ~M costovertebral angle tenderness [No Spinal Tenderness] : no spinal tenderness [Abnormal Walk] : normal gait [Nail Clubbing] : no clubbing  or cyanosis of the fingernails [Musculoskeletal - Swelling] : no joint swelling seen [Motor Tone] : muscle strength and tone were normal [FreeTextEntry1] : \par Hands- normal\par Wrists- normal\par Elbows- normal\par Shoulders- normal\par Hips- normal\par Knees- Patellofemoral crepitus bilaterally without effusion. \par Ankles- normal\par Feet- normal\par MMT 10/10 proximally/distally bilaterally  [Skin Color & Pigmentation] : normal skin color and pigmentation [Skin Turgor] : normal skin turgor [] : no rash [Skin Lesions] : no skin lesions [Deep Tendon Reflexes (DTR)] : deep tendon reflexes were 2+ and symmetric [Sensation] : the sensory exam was normal to light touch and pinprick [Motor Exam] : the motor exam was normal [No Focal Deficits] : no focal deficits [Oriented To Time, Place, And Person] : oriented to person, place, and time [Impaired Insight] : insight and judgment were intact [Affect] : the affect was normal [Mood] : the mood was normal

## 2021-06-07 NOTE — HISTORY OF PRESENT ILLNESS
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

## 2021-06-07 NOTE — ASSESSMENT
[FreeTextEntry1] : 55y F with ?weak dx of SLE in the past not on treatment (likely reported +CURLY history) p/w new diagnosis of ILD w/ bilateral GGO on CT imaging and restrictive PFT pattern. She has laboratory abnormalities with slight mild-moderate RF elevation which may be 2/2 her lung disease. She also has a high positive titer CURLY 1:1280 speckled pattern for which she needs additional serologies. Systemic inflammatory autoimmune rheumatic disease such as MCTD, Sjögren's, SLE or much less likely idiopathic inflammatory myositis, vasculitis, or sarcoidosis.  I recommend full serologies at this time.\par \par - start MMF 500mg BID then increase to 1g BID in 2 weeks\par - c/w steroids and PCP ppx\par - pulm f/u\par \par RTO 4 weeks

## 2021-07-19 ENCOUNTER — APPOINTMENT (OUTPATIENT)
Dept: DISASTER EMERGENCY | Facility: CLINIC | Age: 56
End: 2021-07-19

## 2021-07-20 LAB — SARS-COV-2 N GENE NPH QL NAA+PROBE: NOT DETECTED

## 2021-07-22 ENCOUNTER — APPOINTMENT (OUTPATIENT)
Dept: PULMONOLOGY | Facility: CLINIC | Age: 56
End: 2021-07-22
Payer: MEDICARE

## 2021-07-22 VITALS — HEIGHT: 61 IN | WEIGHT: 142 LBS | BODY MASS INDEX: 26.81 KG/M2

## 2021-07-22 VITALS — RESPIRATION RATE: 16 BRPM | HEART RATE: 84 BPM | OXYGEN SATURATION: 98 %

## 2021-07-22 VITALS — DIASTOLIC BLOOD PRESSURE: 60 MMHG | SYSTOLIC BLOOD PRESSURE: 140 MMHG

## 2021-07-22 DIAGNOSIS — R06.00 DYSPNEA, UNSPECIFIED: ICD-10-CM

## 2021-07-22 PROCEDURE — 94010 BREATHING CAPACITY TEST: CPT

## 2021-07-22 PROCEDURE — 94727 GAS DIL/WSHOT DETER LNG VOL: CPT

## 2021-07-22 PROCEDURE — 94729 DIFFUSING CAPACITY: CPT

## 2021-07-22 PROCEDURE — 99214 OFFICE O/P EST MOD 30 MIN: CPT | Mod: 25

## 2021-07-22 PROCEDURE — 85018 HEMOGLOBIN: CPT | Mod: QW

## 2021-07-22 NOTE — PHYSICAL EXAM
[No Acute Distress] : no acute distress [Normal Oropharynx] : normal oropharynx [Normal Appearance] : normal appearance [No Neck Mass] : no neck mass [Normal Rate/Rhythm] : normal rate/rhythm [Normal S1, S2] : normal s1, s2 [No Murmurs] : no murmurs [No Resp Distress] : no resp distress [No Acc Muscle Use] : no acc muscle use [Normal Rhythm and Effort] : normal rhythm and effort [Clear to Auscultation Bilaterally] : clear to auscultation bilaterally [No Abnormalities] : no abnormalities [Benign] : benign [Normal Gait] : normal gait [No Cyanosis] : no cyanosis [No Clubbing] : no clubbing [No Edema] : no edema [FROM] : FROM [Normal Color/ Pigmentation] : normal color/ pigmentation [No Focal Deficits] : no focal deficits [Oriented x3] : oriented x3 [Normal Affect] : normal affect

## 2021-08-10 ENCOUNTER — APPOINTMENT (OUTPATIENT)
Dept: RHEUMATOLOGY | Facility: CLINIC | Age: 56
End: 2021-08-10
Payer: MEDICARE

## 2021-08-10 VITALS
OXYGEN SATURATION: 96 % | SYSTOLIC BLOOD PRESSURE: 121 MMHG | WEIGHT: 121 LBS | BODY MASS INDEX: 22.84 KG/M2 | DIASTOLIC BLOOD PRESSURE: 85 MMHG | HEART RATE: 79 BPM | HEIGHT: 61 IN

## 2021-08-10 DIAGNOSIS — R74.8 ABNORMAL LEVELS OF OTHER SERUM ENZYMES: ICD-10-CM

## 2021-08-10 DIAGNOSIS — R79.89 OTHER SPECIFIED ABNORMAL FINDINGS OF BLOOD CHEMISTRY: ICD-10-CM

## 2021-08-10 PROCEDURE — 99215 OFFICE O/P EST HI 40 MIN: CPT

## 2021-08-23 ENCOUNTER — APPOINTMENT (OUTPATIENT)
Dept: CT IMAGING | Facility: CLINIC | Age: 56
End: 2021-08-23
Payer: MEDICARE

## 2021-08-23 ENCOUNTER — OUTPATIENT (OUTPATIENT)
Dept: OUTPATIENT SERVICES | Facility: HOSPITAL | Age: 56
LOS: 1 days | End: 2021-08-23

## 2021-08-23 DIAGNOSIS — R06.00 DYSPNEA, UNSPECIFIED: ICD-10-CM

## 2021-08-23 PROCEDURE — 71250 CT THORAX DX C-: CPT | Mod: 26

## 2021-08-24 ENCOUNTER — APPOINTMENT (OUTPATIENT)
Dept: PHYSICAL MEDICINE AND REHAB | Facility: CLINIC | Age: 56
End: 2021-08-24
Payer: MEDICARE

## 2021-08-24 VITALS
OXYGEN SATURATION: 100 % | SYSTOLIC BLOOD PRESSURE: 119 MMHG | TEMPERATURE: 98.1 F | HEART RATE: 73 BPM | DIASTOLIC BLOOD PRESSURE: 78 MMHG

## 2021-08-24 DIAGNOSIS — M48.061 SPINAL STENOSIS, LUMBAR REGION WITHOUT NEUROGENIC CLAUDICATION: ICD-10-CM

## 2021-08-24 DIAGNOSIS — M54.16 RADICULOPATHY, LUMBAR REGION: ICD-10-CM

## 2021-08-24 DIAGNOSIS — M25.559 PAIN IN UNSPECIFIED HIP: ICD-10-CM

## 2021-08-24 PROCEDURE — 99204 OFFICE O/P NEW MOD 45 MIN: CPT | Mod: GC

## 2021-08-24 NOTE — DATA REVIEWED
[FreeTextEntry1] : CT L Spine 2016 ZP rad reviewed by me: multilevel degenerative changes \par \par PATIENT NAME: Cait Landon\par PATIENT PHONE NUMBER:\par PATIENT ID: [id number]\par : 1965\par DATE OF EXAM: 2016\par R. Phys. Name: Jasen Garcia\par R. Phys. Address: [address] [place] [address], [address] [place] [address], [facility]\par R. Phys. Phone: [phone]\par CT-LUMBAR SPINE W/O CON\par \par HISTORY: M54.42 Lower back pain with left sciatica M54.5 Lower Back Pain\par R20.2 Upper and Lower Extremity Pins and Needles M79.604 Right Leg Pain\par M62.830 Spasm of back muscles\par \par Technique: Helical CT scanning of the lumbar spine was performed without administration of\par intravenous contrast. Coronal and sagittal reformatted images were done. This study was\par performed using automatic exposure control (radiation dose reduction software) to obtain a\par diagnostic image quality scan with patient dose as low as reasonably achievable. The\par administered radiation dose was mSv.\par \par Findings: There is mild levoscoliosis. Alignment is within normal limits. Vertebral body\par height is preserved. Disc space height is maintained. There is no aggressive osseous\par lesion seen. There is no acute fracture. There is a left renal cyst.\par \par At L5-S1, there is disc bulge and facet arthropathy with bilateral subarticular recess\par stenosis and suspected compression descending S1 nerve roots. There is mild central spinal\par canal stenosis. There is moderate right and mild left neuroforaminal stenosis.\par \par At L4-5, there is mild disc bulge and facet arthropathy. There is bilateral subarticular\par recess stenosis. There is mild neuroforaminal stenosis on the right.\par \par At L3-4, There is no significant spinal canal or neural foraminal stenosis.\par \par At L2-3, There is no significant spinal canal or neural foraminal stenosis.\par \par At L1-2, There is no significant spinal canal neural foraminal stenosis.\par \par IMPRESSION:\par \par \par Degenerative changes lower lumbar spine notable for subarticular recess stenosis L5-S1\par with suspected compression descending S1 nerve roots bilaterally.\par \par \par ICD 10 -\par Degeneration lumbar spine, M51.36\par Degeneration L5-S1, M51.37\par \par Signed by: Srinivas Quesada MD\par Signed Date: 2016 7:45 PM\par \par \par SIGNED BY: Srinivas Quesada M.D., Ext. [phone] 07:45 PM
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
## 2021-08-24 NOTE — PHYSICAL EXAM
[FreeTextEntry1] : PE:\par Constitutional: In NAD, calm and cooperative\par HEENT: NCAT, Anicteric sclera, no lid-lag\par Cardio: Extremities appear pink and well perfused, no peripheral edema\par Respiratory: Normal respiratory effort on room air, no accessory muscle use\par Skin: no rashes seen on exposed skin, no visible abrasions\par Psych: Normal affect, intact judgment and insight\par Neuro: Awake, alert and oriented x 3, see below for focused neurological exam\par MSK (Back)\par 	Inspection: no gross swelling identified\par 	Palpation: Tenderness of the right lower lumbar paraspinals, right lateral low back, right PSIS, right hip, right ASIS\par 	ROM: Limited ROM lumbar flexion and extension. Pain at end lumbar extension/flexion. Pain with IR of R hip.\par 	Strength: 4/5 right hip flexion limited by pain, 5/5 strength in left lower extremities\par 	Reflexes: 2+ Patella reflex bilaterally, 2+  Achilles reflex bilaterally, negative clonus bilaterally\par 	Sensation: decreased sensation over medial/anterior right thigh and right calf\par Special tests:\par SLR:positive on the right, negative on the left\par RAMÓN:positive on the right\par FADIR:positive on the right\par Facet loading:positive on the right

## 2021-08-24 NOTE — ASSESSMENT
[FreeTextEntry1] : Patient is a 56 y.o. Georgian-speaking F with PMHx ?SLE/Sjogren's on chronic Prednisone, referred by PCP for right low back pain and right hip pain. CT of lumbar spine reviewed. Pain is likely due to right lumbar radiculopathy and possible right hip pathology (OA). Denies any red flag signs. Will recommend:\par - XR of right hip due to chronic steroid use\par - MRI of lumbar spine, as pain is consistent with radiculopathy, with possible nerve impingement seen on CT, with possible interventional procedure (AMNA) being considered. \par - Start Gabapentin 100mg QHS and titrate up as tolerated to 300mg Qhs. Side effects reviewed with patient. Patient told not to drive or operate heavy machinery while on Gabapentin due to possible sedation. \par - Consider Cymbalta at next visit if not improved for chronic MSK pain. \par - Consider lumbar epidural injection in the future pending MRI results, given CT findings and patient's symptoms. \par \par RTC in 2-3 weeks to reassess pain and review imaging. Patient aware of red flag signs including any changes to their bowel/bladder control, groin numbness or new weakness. Patient knows to seek immediate attention by calling 911 or going to nearest ER if these symptoms appear.\par

## 2021-09-02 ENCOUNTER — TRANSCRIPTION ENCOUNTER (OUTPATIENT)
Age: 56
End: 2021-09-02

## 2021-09-11 ENCOUNTER — APPOINTMENT (OUTPATIENT)
Dept: DISASTER EMERGENCY | Facility: CLINIC | Age: 56
End: 2021-09-11

## 2021-09-13 LAB — SARS-COV-2 N GENE NPH QL NAA+PROBE: DETECTED

## 2021-09-14 ENCOUNTER — APPOINTMENT (OUTPATIENT)
Dept: PULMONOLOGY | Facility: CLINIC | Age: 56
End: 2021-09-14

## 2021-09-14 DIAGNOSIS — Z20.828 CONTACT WITH AND (SUSPECTED) EXPOSURE TO OTHER VIRAL COMMUNICABLE DISEASES: ICD-10-CM

## 2021-09-16 ENCOUNTER — APPOINTMENT (OUTPATIENT)
Dept: OBGYN | Facility: CLINIC | Age: 56
End: 2021-09-16
Payer: MEDICARE

## 2021-09-16 VITALS
DIASTOLIC BLOOD PRESSURE: 70 MMHG | HEIGHT: 61 IN | WEIGHT: 141 LBS | BODY MASS INDEX: 26.62 KG/M2 | SYSTOLIC BLOOD PRESSURE: 120 MMHG

## 2021-09-16 DIAGNOSIS — Z12.31 ENCOUNTER FOR SCREENING MAMMOGRAM FOR MALIGNANT NEOPLASM OF BREAST: ICD-10-CM

## 2021-09-16 DIAGNOSIS — Z01.419 ENCOUNTER FOR GYNECOLOGICAL EXAMINATION (GENERAL) (ROUTINE) W/OUT ABNORMAL FINDINGS: ICD-10-CM

## 2021-09-16 DIAGNOSIS — Z01.818 ENCOUNTER FOR OTHER PREPROCEDURAL EXAMINATION: ICD-10-CM

## 2021-09-16 PROCEDURE — 99386 PREV VISIT NEW AGE 40-64: CPT

## 2021-09-16 NOTE — PHYSICAL EXAM
[Appropriately responsive] : appropriately responsive [Alert] : alert [No Acute Distress] : no acute distress [No Murmurs] : no murmurs [Soft] : soft [Non-tender] : non-tender [Non-distended] : non-distended [Oriented x3] : oriented x3 [Examination Of The Breasts] : a normal appearance [No Discharge] : no discharge [No Masses] : no breast masses were palpable [Labia Majora] : normal [Labia Minora] : normal [No Bleeding] : There was no active vaginal bleeding [Normal] : normal [Uterine Adnexae] : normal

## 2021-09-17 ENCOUNTER — APPOINTMENT (OUTPATIENT)
Dept: PHYSICAL MEDICINE AND REHAB | Facility: CLINIC | Age: 56
End: 2021-09-17

## 2021-09-17 PROBLEM — Z01.818 PREOP TESTING: Status: RESOLVED | Noted: 2021-01-18 | Resolved: 2021-09-17

## 2021-09-17 NOTE — END OF VISIT
[FreeTextEntry3] : I, [Akshat Paris] solely acted as scribe for Dr. Karmen Gerard on 09/16/2021 \par All medical entries made by the Scribe were at my, Dr. Gerard’s, direction and personally\par dictated by me on 09/16/2021 . I have reviewed the chart and agree that the record\par accurately reflects my personal performance of the history, physical exam, assessment and plan. I\par have also personally directed, reviewed, and agreed with the chart.

## 2021-09-17 NOTE — HISTORY OF PRESENT ILLNESS
[Patient reported mammogram was normal] : Patient reported mammogram was normal [Patient reported breast sonogram was normal] : Patient reported breast sonogram was normal [N] : Patient is not sexually active [Y] : Positive pregnancy history [Menarche Age: ____] : age at menarche was [unfilled] [No] : Patient does not have concerns regarding sex [Previously active] : previously active [Mammogramdate] : 01/01/19 [TextBox_19] : AS PER PATIENT  [BreastSonogramDate] : 01/01/19 [TextBox_25] : AS PER PATIENT  [PapSmeardate] : 11/01/20 [TextBox_31] : AS PER PATIENT  [LMPDate] : 04/15/13 [PGHxTotal] : 5 [[facility]] : 3 [PGHxABSpont] : 2 [FreeTextEntry1] : 04/15/13

## 2021-09-17 NOTE — DISCUSSION/SUMMARY
[FreeTextEntry1] : Pap done today.\par Prescription for mammogram screening and breast US given.\par Self-breast exam reviewed.\par She will follow up annually and as needed.

## 2021-10-11 ENCOUNTER — APPOINTMENT (OUTPATIENT)
Dept: OBGYN | Facility: CLINIC | Age: 56
End: 2021-10-11
Payer: MEDICARE

## 2021-10-11 VITALS
WEIGHT: 142 LBS | BODY MASS INDEX: 26.81 KG/M2 | DIASTOLIC BLOOD PRESSURE: 80 MMHG | SYSTOLIC BLOOD PRESSURE: 122 MMHG | HEIGHT: 61 IN

## 2021-10-11 DIAGNOSIS — N89.8 OTHER SPECIFIED NONINFLAMMATORY DISORDERS OF VAGINA: ICD-10-CM

## 2021-10-11 PROCEDURE — 99213 OFFICE O/P EST LOW 20 MIN: CPT

## 2021-10-11 NOTE — REVIEW OF SYSTEMS
[Constipation] : constipation [Incontinence] : incontinence [Patient Intake Form Reviewed] : Patient intake form was reviewed [Abn Vaginal bleeding] : no abnormal vaginal bleeding [Genital Rash/Irritation] : no genital rash/irritation

## 2021-10-11 NOTE — PHYSICAL EXAM
[Appropriately responsive] : appropriately responsive [Alert] : alert [No Acute Distress] : no acute distress [Normal] : normal external genitalia [Atrophy] : atrophy [Rectocele] : a rectocele [Cystocele] : a cystocele [Discharge] : a  ~M vaginal discharge was present [Moderate] : moderate [White] : white [Thick] : thick [No Bleeding] : There was no active vaginal bleeding [Absent] : absent [Uterine Adnexae] : absent

## 2021-10-11 NOTE — DISCUSSION/SUMMARY
[FreeTextEntry1] : She has a significant rectocele and cystocele on exam. No cervix palpated, and on review of operative report a total hysterectomy was performed. I explained that she does not need pap smears in the future. \par Based on her symptoms I recommended evaluation by a urogynecologist to discuss possible surgical intervention. \par There was incidentally found discharge on exam, I collected affirm and will treat if positive as she is asymptomatic.

## 2021-10-11 NOTE — HISTORY OF PRESENT ILLNESS
[Patient reported mammogram was normal] : Patient reported mammogram was normal [Patient reported breast sonogram was normal] : Patient reported breast sonogram was normal [Patient reported PAP Smear was normal] : Patient reported PAP Smear was normal [N] : Patient does not use contraception [Y] : Patient is sexually active [Monogamous (Male Partner)] : is monogamous with a male partner [Menarche Age: ____] : age at menarche was [unfilled] [No] : Patient does not have concerns regarding sex [Currently Active] : currently active [Mammogramdate] : 01/01/19 [BreastSonogramDate] : 01/01/19 [PapSmeardate] : 11/01/20 [LMPDate] : 04/15/13 [PGHxTotal] : 5 [[facility]] : 3 [[facility]iving] : 3 [PGHxABSpont] : 2 [FreeTextEntry1] : 04/15/13

## 2021-10-13 LAB
CANDIDA VAG CYTO: NOT DETECTED
G VAGINALIS+PREV SP MTYP VAG QL MICRO: NOT DETECTED
T VAGINALIS VAG QL WET PREP: NOT DETECTED

## 2021-10-14 ENCOUNTER — APPOINTMENT (OUTPATIENT)
Dept: MAMMOGRAPHY | Facility: CLINIC | Age: 56
End: 2021-10-14
Payer: MEDICARE

## 2021-10-14 ENCOUNTER — OUTPATIENT (OUTPATIENT)
Dept: OUTPATIENT SERVICES | Facility: HOSPITAL | Age: 56
LOS: 1 days | End: 2021-10-14
Payer: COMMERCIAL

## 2021-10-14 ENCOUNTER — RESULT REVIEW (OUTPATIENT)
Age: 56
End: 2021-10-14

## 2021-10-14 DIAGNOSIS — Z00.8 ENCOUNTER FOR OTHER GENERAL EXAMINATION: ICD-10-CM

## 2021-10-14 PROCEDURE — 77067 SCR MAMMO BI INCL CAD: CPT | Mod: 26

## 2021-10-14 PROCEDURE — 77067 SCR MAMMO BI INCL CAD: CPT

## 2021-10-14 PROCEDURE — 77063 BREAST TOMOSYNTHESIS BI: CPT

## 2021-10-14 PROCEDURE — 77063 BREAST TOMOSYNTHESIS BI: CPT | Mod: 26

## 2021-10-18 ENCOUNTER — LABORATORY RESULT (OUTPATIENT)
Age: 56
End: 2021-10-18

## 2021-10-19 ENCOUNTER — APPOINTMENT (OUTPATIENT)
Dept: DISASTER EMERGENCY | Facility: CLINIC | Age: 56
End: 2021-10-19

## 2021-10-20 LAB — SARS-COV-2 N GENE NPH QL NAA+PROBE: NOT DETECTED

## 2021-10-22 ENCOUNTER — APPOINTMENT (OUTPATIENT)
Dept: PULMONOLOGY | Facility: CLINIC | Age: 56
End: 2021-10-22
Payer: MEDICARE

## 2021-10-22 VITALS — WEIGHT: 137 LBS | BODY MASS INDEX: 25.86 KG/M2 | HEIGHT: 61 IN

## 2021-10-22 VITALS
OXYGEN SATURATION: 96 % | RESPIRATION RATE: 16 BRPM | HEART RATE: 78 BPM | SYSTOLIC BLOOD PRESSURE: 120 MMHG | DIASTOLIC BLOOD PRESSURE: 72 MMHG

## 2021-10-22 DIAGNOSIS — R05.3 CHRONIC COUGH: ICD-10-CM

## 2021-10-22 DIAGNOSIS — R09.89 OTHER SPECIFIED SYMPTOMS AND SIGNS INVOLVING THE CIRCULATORY AND RESPIRATORY SYSTEMS: ICD-10-CM

## 2021-10-22 PROCEDURE — 94727 GAS DIL/WSHOT DETER LNG VOL: CPT

## 2021-10-22 PROCEDURE — 85018 HEMOGLOBIN: CPT | Mod: QW

## 2021-10-22 PROCEDURE — 99214 OFFICE O/P EST MOD 30 MIN: CPT | Mod: 25

## 2021-10-22 PROCEDURE — 94010 BREATHING CAPACITY TEST: CPT

## 2021-10-22 PROCEDURE — 94729 DIFFUSING CAPACITY: CPT

## 2021-10-22 NOTE — PROCEDURE
[FreeTextEntry1] : [facility]\par EXAM: CT CHEST\par \par \par PROCEDURE DATE: 08/23/2021\par \par \par \par INTERPRETATION: .\par ACC: [id number]\par INDICATION: Dyspnea, interstitial lung disease, SLE. On immunosuppressive>needs f/u of bilateral infiltrates.\par TECHNIQUE: Unenhanced CT of the chest. Coronal and sagittal images were reconstructed. Maximum intensity projection images were generated.\par COMPARISON: CT chest 3/27/2021\par \par FINDINGS:\par \par AIRWAYS, LUNGS and PLEURA: Patent central airways. Diffuse groundglass opacities and septal thickening are increased from 3/27/2021. Mild mucoid impaction and bronchiectasis within the lingula is unchanged. No pleural effusion.\par \par MEDIASTINUM AND HERNESTO: No lymphadenopathy.\par \par HEART AND VESSELS: Heart size is normal. No pericardial effusion. Thoracic aorta and pulmonary artery normal in diameter.\par \par VISUALIZED UPPER ABDOMEN: Small hiatal hernia.\par \par CHEST WALL AND BONES: No aggressive osseous lesion. Bilateral breast implants.\par \par LOWER NECK: Within normal limits.\par \par IMPRESSION:\par \par Diffuse groundglass opacities and septal thickening are increased from 3/27/2021.\par \par --- End of Report ---\par \par \par \par \par \par \par SAUL GUERRA MD; Attending Radiologist\par This document has been electronically signed. Aug 25 2021 10:23AM\par \par ~~~~~~~~~~~~~~~~~~~~~~~~~~~~~~~~~~~~~~~~~~~~~~~~~~~~~~~~~~~~~~~~~~~~~~~~\par \par [facility]\par EXAM: CT CHEST\par \par \par PROCEDURE DATE: 03/27/2021\par \par \par \par INTERPRETATION: EXAMINATION: CT CHEST\par \par CLINICAL INDICATION: Lung opacity.\par \par TECHNIQUE: Noncontrast CT of the chest was obtained.\par \par COMPARISON: 1/21/2021.\par \par FINDINGS:\par \par AIRWAYS AND LUNGS: The central tracheobronchial tree is patent. Bilateral ill-defined groundglass opacities and tiny nodules are decreased but not resolved compared to the prior study.\par \par MEDIASTINUM AND PLEURA: There are no enlarged mediastinal, hilar or axillary lymph nodes. The visualized portion of the thyroid gland is unremarkable. There is no pleural effusion. There is no pneumothorax.\par \par HEART AND VESSELS: The heart is normal in size. There are no atherosclerotic calcifications of the aorta. There is no pericardial effusion.\par \par UPPER ABDOMEN: Images of the upper abdomen demonstrate no abnormality.\par \par BONES AND SOFT TISSUES: The bones are unremarkable. The soft tissues are unremarkable.\par \par TUBES/LINES: None.\par \par IMPRESSION:\par Bilateral ill-defined groundglass opacities and tiny nodules are decreased but not resolved compared to the prior study. Continued follow-up CT recommended for complete evaluation.\par \par \par \par \par \par \par TARA ALMENDAREZ MD; Attending Radiologist\par This document has been electronically signed. Mar 30 2021 8:26PM\par \par ~~~~~~~~~~~~~~~~~~~~~~~~~~~~~~~~~~~~~~~~~~~~~~~~~~~~~~~~~~~~~~~~~~~~~~~~\par \par [facility]\par \par EXAM: CT CHEST\par \par \par PROCEDURE DATE: 01/21/2021\par \par \par \par INTERPRETATION: EXAMINATION: CT CHEST\par \par CLINICAL INDICATION: Abnormal chest x-ray.\par \par TECHNIQUE: Noncontrast CT of the chest was obtained.\par \par COMPARISON: None.\par \par FINDINGS:\par \par AIRWAYS AND LUNGS: The central tracheobronchial tree is patent. Bilateral ill-defined groundglass opacity and tiny nodules with subpleural sparing. Mosaic attenuation. No bronchiectasis or honeycombing.\par \par MEDIASTINUM AND PLEURA: There are no enlarged mediastinal, hilar or axillary lymph nodes. The visualized portion of the thyroid gland is unremarkable. There is no pleural effusion. There is no pneumothorax.\par \par HEART AND VESSELS: The heart is normal in size. There are no atherosclerotic calcifications of the aorta. There is no pericardial effusion.\par \par UPPER ABDOMEN: Images of the upper abdomen demonstrate slightly lobulated left kidney..\par \par BONES AND SOFT TISSUES: The bones are unremarkable. Bilateral breast implants.\par \par TUBES/LINES: None.\par \par IMPRESSION:\par Lung findings are new from cervical spine CT 1/15/2009 and are indeterminate. Interstitial lung disease should be considered.\par \par Slightly lobulated left kidney, ultrasound recommended for complete evaluation.\par \par \par \par \par \par \par TARA ALMENDAREZ MD; Attending Radiologist\par This document has been electronically signed. Jan 22 2021 8:25AM

## 2021-10-22 NOTE — HISTORY OF PRESENT ILLNESS
[Former] : former [TextBox_4] : 56F PMH COVID (09/2021), Sjogren's, ILD on MMF and 10mg Q48H Prednisone who presents for follow up. She had asymptomatic COVID 09/2021, did not receive any medications. She reports feeling well, she still has lot of phlegm in her chest, she unable to bring it up. She is on Omeprazole for GERD. In August her steroids were decreased to 10mg Q48H. She does not feel her breathing symptoms are getting worse. No fevers, no chills. She occasionally has chest pain when she goes to bed, associated with cough. \par \par [employer]  #[id number] ([place]) [TextBox_11] : 0.1 [TextBox_13] : 4 [YearQuit] : 1991

## 2021-10-25 ENCOUNTER — NON-APPOINTMENT (OUTPATIENT)
Age: 56
End: 2021-10-25

## 2021-11-05 ENCOUNTER — NON-APPOINTMENT (OUTPATIENT)
Age: 56
End: 2021-11-05

## 2021-11-09 ENCOUNTER — APPOINTMENT (OUTPATIENT)
Dept: RHEUMATOLOGY | Facility: CLINIC | Age: 56
End: 2021-11-09
Payer: MEDICARE

## 2021-11-09 VITALS
DIASTOLIC BLOOD PRESSURE: 72 MMHG | SYSTOLIC BLOOD PRESSURE: 109 MMHG | HEIGHT: 61 IN | BODY MASS INDEX: 25.86 KG/M2 | WEIGHT: 137 LBS | OXYGEN SATURATION: 95 % | HEART RATE: 79 BPM

## 2021-11-09 DIAGNOSIS — M43.16 SPONDYLOLISTHESIS, LUMBAR REGION: ICD-10-CM

## 2021-11-09 PROCEDURE — 99214 OFFICE O/P EST MOD 30 MIN: CPT | Mod: 25

## 2021-11-09 RX ORDER — PREDNISONE 20 MG/1
20 TABLET ORAL
Qty: 60 | Refills: 1 | Status: DISCONTINUED | COMMUNITY
Start: 2021-01-26 | End: 2021-11-09

## 2021-11-09 RX ORDER — GABAPENTIN 300 MG/1
300 CAPSULE ORAL
Refills: 0 | Status: DISCONTINUED | COMMUNITY
End: 2021-11-09

## 2021-11-09 RX ORDER — PREDNISONE 10 MG/1
10 TABLET ORAL
Qty: 30 | Refills: 1 | Status: DISCONTINUED | COMMUNITY
Start: 2021-04-06 | End: 2021-11-09

## 2021-11-17 ENCOUNTER — RESULT CHARGE (OUTPATIENT)
Age: 56
End: 2021-11-17

## 2021-11-17 ENCOUNTER — APPOINTMENT (OUTPATIENT)
Age: 56
End: 2021-11-17
Payer: MEDICARE

## 2021-11-17 VITALS — HEART RATE: 76 BPM | SYSTOLIC BLOOD PRESSURE: 125 MMHG | DIASTOLIC BLOOD PRESSURE: 79 MMHG | TEMPERATURE: 98.2 F

## 2021-11-17 DIAGNOSIS — Z87.09 PERSONAL HISTORY OF OTHER DISEASES OF THE RESPIRATORY SYSTEM: ICD-10-CM

## 2021-11-17 DIAGNOSIS — R35.0 FREQUENCY OF MICTURITION: ICD-10-CM

## 2021-11-17 DIAGNOSIS — Z86.018 PERSONAL HISTORY OF OTHER BENIGN NEOPLASM: ICD-10-CM

## 2021-11-17 DIAGNOSIS — R10.2 PELVIC AND PERINEAL PAIN: ICD-10-CM

## 2021-11-17 DIAGNOSIS — R32 UNSPECIFIED URINARY INCONTINENCE: ICD-10-CM

## 2021-11-17 DIAGNOSIS — Z87.828 PERSONAL HISTORY OF OTHER (HEALED) PHYSICAL INJURY AND TRAUMA: ICD-10-CM

## 2021-11-17 LAB
BILIRUB UR QL STRIP: NEGATIVE
CLARITY UR: CLEAR
COLLECTION METHOD: NORMAL
GLUCOSE UR-MCNC: NEGATIVE
HCG UR QL: 0.2 EU/DL
HGB UR QL STRIP.AUTO: NEGATIVE
KETONES UR-MCNC: NEGATIVE
LEUKOCYTE ESTERASE UR QL STRIP: NEGATIVE
NITRITE UR QL STRIP: NEGATIVE
PH UR STRIP: 7
PROT UR STRIP-MCNC: NEGATIVE
SP GR UR STRIP: 1.02

## 2021-11-17 PROCEDURE — 99204 OFFICE O/P NEW MOD 45 MIN: CPT | Mod: 25

## 2021-11-17 PROCEDURE — 81003 URINALYSIS AUTO W/O SCOPE: CPT | Mod: QW

## 2021-11-17 PROCEDURE — 51701 INSERT BLADDER CATHETER: CPT

## 2021-11-17 NOTE — PHYSICAL EXAM
[Chaperone Present] : A chaperone was present in the examining room during all aspects of the physical examination [No Acute Distress] : in no acute distress [Well developed] : well developed [Well Nourished] : ~L well nourished [Oriented x3] : oriented to person, place, and time [Normal Memory] : ~T memory was ~L unimpaired [Normal Mood/Affect] : mood and affect are normal [No Edema] : ~T edema was not present [Scar] : a scar was noted [RLQ] : in the right lower quadrant [Suprapubic] : in the suprapubic area [LLQ] : in the left lower quadrant [Warm and Dry] : was warm and dry to touch [Normal Gait] : gait was normal [Labia Majora] : were normal [Labia Minora] : were normal [Atrophy] : atrophy [No Bleeding] : there was no active vaginal bleeding [3] : 3 [Aa ____] : Aa [unfilled] [Ba ____] : Ba [unfilled] [C ____] : C [unfilled] [GH ____] : GH [unfilled] [PB ____] : PB [unfilled] [TVL ____] : TVL  [unfilled] [Ap ____] : Ap [unfilled] [Bp ____] : Bp [unfilled] [D ____] : D [unfilled] [Absent] : absent [Soft] :  the cervix was soft [Normal] : no abnormalities [Post Void Residual ____ml] : post void residual was [unfilled] ml [Cough] : no cough [Tenderness] : ~T no ~M abdominal tenderness observed [Distended] : not distended [Inguinal LAD] : no adenopathy was noted in the inguinal lymph nodes [FreeTextEntry3] : neg CST, hypermobility based on POPQ

## 2021-11-17 NOTE — REASON FOR VISIT
[Initial Visit ___] : an initial visit for [unfilled] [[employer] ] : provided by [employer]   [Questionnaire Received] : Patient questionnaire received [Intake Form Reviewed] : Patient intake form with past medical history, surgical history, family history and social history reviewed today [Interpreters_IDNumber] : [id number] [Interpreters_FullName] : Dee

## 2021-11-17 NOTE — HISTORY OF PRESENT ILLNESS
[Cystocele (Obstetric)] : no [Vaginal Wall Prolapse] : no [Rectal Prolapse] : no [Unable To Restrain Bowel Movement] : mild [x2] : nocturia two times a night [Urinary Frequency] : no [Feelings Of Urinary Urgency] : no [Pain During Urination (Dysuria)] : no [Urinary Tract Infection] : moderate [Constipation Obstructed Defecation] : moderate [Incomplete Emptying Of Stool] : no [] : no [Pelvic Pain] : no [Vaginal Pain] : no [Vulvar Pain] : no [de-identified] : 4 [FreeTextEntry1] : \par  57 y/o s/p TVH/Pedro 2012 by Dr. Johnston presents with vaginal bulge, she reports it started 4 years ago, she feels a lump coming out of the vagina when she has a BM, sometimes has pain, sometimes difficulty with BM, reports CASSANDRA, reports UUI, denies gross hematuria, denies incomplete emptying, denies UTIs, denies pain with urination, she is not sexually active bc of this problem \par \par

## 2021-11-17 NOTE — PROCEDURE
[Straight Catheterization] : insertion of a straight catheter [Urinary Frequency] : urinary frequency [Patient] : the patient [___ Fr Straight Tip] : a [unfilled] in [place] straight tip catheter [None] : there were no complications with the catheter insertion [Clear] : clear [No Complications] : no complications [Tolerated Well] : the patient tolerated the procedure well [Post procedure instructions and information given] : Post procedure instructions and information were given and reviewed with patient. [0] : 0

## 2021-11-17 NOTE — DISCUSSION/SUMMARY
[FreeTextEntry1] : \par Cait presents with posthysterectomy fault prolapse, negative CST, normal PVR. Visual illustrations were used to demonstrate prolapse. We reviewed management options for her prolapse including: observation, pelvic floor exercises with and without physical therapy, pessary, and surgical management. We reviewed the different types of surgical procedures including abdominal, vaginal, and robotic/laparoscopic procedures. Mesh and non-mesh options were reviewed. IUGA information on prolapse  in [place] was given to her. Return cystoscopy/UDS. All questions answered. \par \par [] UDS/cysto\par [] anticipate robotic SCP, possible sling, possible post repair \par \par

## 2021-12-03 ENCOUNTER — APPOINTMENT (OUTPATIENT)
Age: 56
End: 2021-12-03
Payer: MEDICARE

## 2021-12-03 PROCEDURE — 51784 ANAL/URINARY MUSCLE STUDY: CPT

## 2021-12-03 PROCEDURE — 51741 ELECTRO-UROFLOWMETRY FIRST: CPT

## 2021-12-03 PROCEDURE — 51797 INTRAABDOMINAL PRESSURE TEST: CPT

## 2021-12-03 PROCEDURE — 51729 CYSTOMETROGRAM W/VP&UP: CPT

## 2021-12-14 ENCOUNTER — OUTPATIENT (OUTPATIENT)
Dept: OUTPATIENT SERVICES | Facility: HOSPITAL | Age: 56
LOS: 1 days | End: 2021-12-14
Payer: COMMERCIAL

## 2021-12-14 ENCOUNTER — APPOINTMENT (OUTPATIENT)
Dept: CT IMAGING | Facility: CLINIC | Age: 56
End: 2021-12-14
Payer: MEDICARE

## 2021-12-14 DIAGNOSIS — R91.8 OTHER NONSPECIFIC ABNORMAL FINDING OF LUNG FIELD: ICD-10-CM

## 2021-12-14 PROCEDURE — 71250 CT THORAX DX C-: CPT | Mod: 26

## 2021-12-14 PROCEDURE — 71250 CT THORAX DX C-: CPT

## 2021-12-20 ENCOUNTER — NON-APPOINTMENT (OUTPATIENT)
Age: 56
End: 2021-12-20

## 2021-12-28 ENCOUNTER — APPOINTMENT (OUTPATIENT)
Age: 56
End: 2021-12-28
Payer: MEDICARE

## 2021-12-28 ENCOUNTER — RESULT CHARGE (OUTPATIENT)
Age: 56
End: 2021-12-28

## 2021-12-28 DIAGNOSIS — N81.9 FEMALE GENITAL PROLAPSE, UNSPECIFIED: ICD-10-CM

## 2021-12-28 LAB
BILIRUB UR QL STRIP: NORMAL
CLARITY UR: CLEAR
COLLECTION METHOD: NORMAL
GLUCOSE UR-MCNC: NORMAL
HCG UR QL: 0.2 EU/DL
HGB UR QL STRIP.AUTO: NORMAL
KETONES UR-MCNC: NORMAL
LEUKOCYTE ESTERASE UR QL STRIP: NORMAL
NITRITE UR QL STRIP: NORMAL
PH UR STRIP: 6
PROT UR STRIP-MCNC: NORMAL
SP GR UR STRIP: 1.02

## 2021-12-28 PROCEDURE — 52000 CYSTOURETHROSCOPY: CPT

## 2022-01-11 ENCOUNTER — APPOINTMENT (OUTPATIENT)
Dept: RHEUMATOLOGY | Facility: CLINIC | Age: 57
End: 2022-01-11
Payer: MEDICARE

## 2022-01-11 VITALS
SYSTOLIC BLOOD PRESSURE: 124 MMHG | OXYGEN SATURATION: 96 % | DIASTOLIC BLOOD PRESSURE: 82 MMHG | BODY MASS INDEX: 25.86 KG/M2 | HEART RATE: 66 BPM | HEIGHT: 61 IN | WEIGHT: 137 LBS

## 2022-01-11 DIAGNOSIS — R79.89 OTHER SPECIFIED ABNORMAL FINDINGS OF BLOOD CHEMISTRY: ICD-10-CM

## 2022-01-11 PROCEDURE — 99214 OFFICE O/P EST MOD 30 MIN: CPT | Mod: 25

## 2022-01-11 RX ORDER — PANTOPRAZOLE 40 MG/1
40 TABLET, DELAYED RELEASE ORAL
Qty: 30 | Refills: 5 | Status: DISCONTINUED | COMMUNITY
End: 2022-01-11

## 2022-01-25 ENCOUNTER — APPOINTMENT (OUTPATIENT)
Dept: PULMONOLOGY | Facility: CLINIC | Age: 57
End: 2022-01-25
Payer: MEDICARE

## 2022-01-25 VITALS
DIASTOLIC BLOOD PRESSURE: 70 MMHG | BODY MASS INDEX: 25.7 KG/M2 | OXYGEN SATURATION: 97 % | RESPIRATION RATE: 16 BRPM | SYSTOLIC BLOOD PRESSURE: 130 MMHG | HEART RATE: 77 BPM | WEIGHT: 136 LBS

## 2022-01-25 PROCEDURE — 99214 OFFICE O/P EST MOD 30 MIN: CPT

## 2022-01-25 RX ORDER — PREDNISONE 10 MG/1
10 TABLET ORAL
Qty: 30 | Refills: 2 | Status: DISCONTINUED | COMMUNITY
Start: 2021-10-22 | End: 2022-01-25

## 2022-01-25 NOTE — PROCEDURE
[FreeTextEntry1] : [facility]: CT [facility]\par \par \par PROCEDURE DATE: 12/14/2021\par \par \par \par INTERPRETATION: INDICATION: Follow-up groundglass\par \par TECHNIQUE: Volumetric images of the chest without intravenous contrast. Maximum intensity projection images were generated.\par \par COMPARISON: CT chest 8/23/2021.\par \par FINDINGS:\par \par LUNGS/AIRWAYS/PLEURA: Patent airways to the segmental bronchi. Allowing for technical differences, no definite change in mosaic perfusion and superimposed groundglass, the latter mostly in the anterior upper lobes. Stable mild bronchial dilatation and mucoid impaction in the lingula. New tree-in-bud opacities in the left upper lobe and left lower lobe. Unremarkable pleura.\par \par LYMPH NODES/MEDIASTINUM: Unremarkable thyroid. No enlarged lymph nodes.\par \par HEART/VASCULATURE: Normal heart size. Unremarkable pericardium. Normal caliber aorta and main pulmonary artery.\par \par UPPER ABDOMEN:Mild partially included fat stranding in the left upper quadrant. Punctate right renal calculus.\par \par BONES/SOFT TISSUES: Degenerative changes of the spine.\par \par \par IMPRESSION:\par \par Since 8/3/2021:\par \par New tree-in-bud in the left upper lobe and left lower lobe, consistent with impacted bronchioles, consider infectious bronchiolitis in the appropriate clinical setting.\par \par Stable airways disease with mosaic perfusion and mucous plugging within the lingula.\par \par Stable superimposed groundglass, predominantly affecting the upper lobes, which in the setting of possible lupus, may reflect pulmonary hemorrhage.\par \par Partially included mild nonspecific fat stranding in the left upper quadrant.\par \par \par \par --- End of Report ---\par \par \par \par \par \par \par KALPESH BHATIA M.D., ATTENDING RADIOGIST\par This document has been electronically signed. Dec 16 2021 5:11PM \par \par Electronically signed by : DELFINO WILKINS MD; Dec 20 2021  3:24PM EST (Author)\par \par ~~~~~~~~~~~~~~~~~~~~~~~~~~~~~~~~~~~~~~~~~~~~~~~~~~~~~~~~~~~~~~~~~~~~~~~~\par \par [facility]: CT CHEST\par \par \par PROCEDURE DATE: 08/23/2021\par \par \par \par INTERPRETATION: .\par ACC: [id number]\par INDICATION: Dyspnea, interstitial lung disease, SLE. On immunosuppressive>needs f/u of bilateral infiltrates.\par TECHNIQUE: Unenhanced CT of the chest. Coronal and sagittal images were reconstructed. Maximum intensity projection images were generated.\par COMPARISON: CT chest 3/27/2021\par \par FINDINGS:\par \par AIRWAYS, LUNGS and PLEURA: Patent central airways. Diffuse groundglass opacities and septal thickening are increased from 3/27/2021. Mild mucoid impaction and bronchiectasis within the lingula is unchanged. No pleural effusion.\par \par MEDIASTINUM AND HERNESTO: No lymphadenopathy.\par \par HEART AND VESSELS: Heart size is normal. No pericardial effusion. Thoracic aorta and pulmonary artery normal in diameter.\par \par VISUALIZED UPPER ABDOMEN: Small hiatal hernia.\par \par CHEST WALL AND BONES: No aggressive osseous lesion. Bilateral breast implants.\par \par LOWER NECK: Within normal limits.\par \par IMPRESSION:\par \par Diffuse groundglass opacities and septal thickening are increased from 3/27/2021.\par \par --- End of Report ---\par \par \par \par \par \par \par SAUL GUERRA MD; Attending Radiologist\par This document has been electronically signed. Aug 25 2021 10:23AM\par \par ~~~~~~~~~~~~~~~~~~~~~~~~~~~~~~~~~~~~~~~~~~~~~~~~~~~~~~~~~~~~~~~~~~~~~~~~\par \par [facility]\par EXAM: CT CHEST\par \par \par PROCEDURE DATE: 03/27/2021\par \par \par \par INTERPRETATION: EXAMINATION: CT CHEST\par \par CLINICAL INDICATION: Lung opacity.\par \par TECHNIQUE: Noncontrast CT of the chest was obtained.\par \par COMPARISON: 1/21/2021.\par \par FINDINGS:\par \par AIRWAYS AND LUNGS: The central tracheobronchial tree is patent. Bilateral ill-defined groundglass opacities and tiny nodules are decreased but not resolved compared to the prior study.\par \par MEDIASTINUM AND PLEURA: There are no enlarged mediastinal, hilar or axillary lymph nodes. The visualized portion of the thyroid gland is unremarkable. There is no pleural effusion. There is no pneumothorax.\par \par HEART AND VESSELS: The heart is normal in size. There are no atherosclerotic calcifications of the aorta. There is no pericardial effusion.\par \par UPPER ABDOMEN: Images of the upper abdomen demonstrate no abnormality.\par \par BONES AND SOFT TISSUES: The bones are unremarkable. The soft tissues are unremarkable.\par \par TUBES/LINES: None.\par \par IMPRESSION:\par Bilateral ill-defined groundglass opacities and tiny nodules are decreased but not resolved compared to the prior study. Continued follow-up CT recommended for complete evaluation.\par \par \par \par \par \par \par TARA ALMENDAREZ MD; Attending Radiologist\par This document has been electronically signed. Mar 30 2021 8:26PM\par \par ~~~~~~~~~~~~~~~~~~~~~~~~~~~~~~~~~~~~~~~~~~~~~~~~~~~~~~~~~~~~~~~~~~~~~~~~\par \par [facility]\par \par EXAM: CT CHEST\par \par \par PROCEDURE DATE: 01/21/2021\par \par \par \par INTERPRETATION: EXAMINATION: CT CHEST\par \par CLINICAL INDICATION: Abnormal chest x-ray.\par \par TECHNIQUE: Noncontrast CT of the chest was obtained.\par \par COMPARISON: None.\par \par FINDINGS:\par \par AIRWAYS AND LUNGS: The central tracheobronchial tree is patent. Bilateral ill-defined groundglass opacity and tiny nodules with subpleural sparing. Mosaic attenuation. No bronchiectasis or honeycombing.\par \par MEDIASTINUM AND PLEURA: There are no enlarged mediastinal, hilar or axillary lymph nodes. The visualized portion of the thyroid gland is unremarkable. There is no pleural effusion. There is no pneumothorax.\par \par HEART AND VESSELS: The heart is normal in size. There are no atherosclerotic calcifications of the aorta. There is no pericardial effusion.\par \par UPPER ABDOMEN: Images of the upper abdomen demonstrate slightly lobulated left kidney..\par \par BONES AND SOFT TISSUES: The bones are unremarkable. Bilateral breast implants.\par \par TUBES/LINES: None.\par \par IMPRESSION:\par Lung findings are new from cervical spine CT 1/15/2009 and are indeterminate. Interstitial lung disease should be considered.\par \par Slightly lobulated left kidney, ultrasound recommended for complete evaluation.\par \par \par \par \par \par \par TARA ALMENDAREZ MD; Attending Radiologist\par This document has been electronically signed. Jan 22 2021 8:25AM \par

## 2022-01-25 NOTE — HISTORY OF PRESENT ILLNESS
[Former] : former [TextBox_4] : 56F PMH asymptomatic COVID (09/2021), Sjogren's, ILD on MMF and 10mg Q48H Prednisone who presents for follow up.  Pt is on MMF for her ILD as well as Omeprazole. No significant SOB. No significant sputum production. Sometimes has wheezing. She reports symptom improvement with Albuterol, 2-3x per week. No fevers, no chills. No chest pain. No sick contacts. \par \par She is going for uterine prolapse surgery on 03/09/2022 with Dr. Mony Mccarthy [TextBox_11] : 0.1 [TextBox_13] : 4 [YearQuit] : 1991

## 2022-02-03 LAB — SARS-COV-2 N GENE NPH QL NAA+PROBE: NOT DETECTED

## 2022-02-04 ENCOUNTER — APPOINTMENT (OUTPATIENT)
Dept: PULMONOLOGY | Facility: CLINIC | Age: 57
End: 2022-02-04
Payer: MEDICARE

## 2022-02-04 VITALS
DIASTOLIC BLOOD PRESSURE: 72 MMHG | HEIGHT: 61 IN | HEART RATE: 80 BPM | WEIGHT: 138 LBS | BODY MASS INDEX: 26.06 KG/M2 | RESPIRATION RATE: 16 BRPM | SYSTOLIC BLOOD PRESSURE: 128 MMHG | OXYGEN SATURATION: 98 %

## 2022-02-04 PROCEDURE — 85018 HEMOGLOBIN: CPT | Mod: QW

## 2022-02-04 PROCEDURE — 94010 BREATHING CAPACITY TEST: CPT

## 2022-02-04 PROCEDURE — 99214 OFFICE O/P EST MOD 30 MIN: CPT | Mod: 25

## 2022-02-04 PROCEDURE — 94729 DIFFUSING CAPACITY: CPT

## 2022-02-04 PROCEDURE — 94727 GAS DIL/WSHOT DETER LNG VOL: CPT

## 2022-02-04 NOTE — HISTORY OF PRESENT ILLNESS
[Former] : former [TextBox_4] : 56F PMH asymptomatic COVID (09/2021), Sjogren's, ILD on MMF and 7.5mg Q48H Prednisone who presents for follow up. Last seen about a week ago. Was lowered from 10 to 7.5mg of Prednisone and started on Breo ellipta. She is going for uterine prolapse repair with possible sling placement in 03/2022 with Dr. Mony Talbert. She denies worsening SOB. She started taking her 7.5mg dose 2 days ago. No fevers, no chills, no chest pain ,no worsening SOB.  [TextBox_11] : 0.1 [TextBox_13] : 4 [YearQuit] : 1991

## 2022-02-04 NOTE — PROCEDURE
[FreeTextEntry1] : [facility]\par EXAM: CT CHEST\par \par \par PROCEDURE DATE: 12/14/2021\par \par \par \par INTERPRETATION: INDICATION: Follow-up groundglass\par \par TECHNIQUE: Volumetric images of the chest without intravenous contrast. Maximum intensity projection images were generated.\par \par COMPARISON: CT chest 8/23/2021.\par \par FINDINGS:\par \par LUNGS/AIRWAYS/PLEURA: Patent airways to the segmental bronchi. Allowing for technical differences, no definite change in mosaic perfusion and superimposed groundglass, the latter mostly in the anterior upper lobes. Stable mild bronchial dilatation and mucoid impaction in the lingula. New tree-in-bud opacities in the left upper lobe and left lower lobe. Unremarkable pleura.\par \par LYMPH NODES/MEDIASTINUM: Unremarkable thyroid. No enlarged lymph nodes.\par \par HEART/VASCULATURE: Normal heart size. Unremarkable pericardium. Normal caliber aorta and main pulmonary artery.\par \par UPPER ABDOMEN:Mild partially included fat stranding in the left upper quadrant. Punctate right renal calculus.\par \par BONES/SOFT TISSUES: Degenerative changes of the spine.\par \par \par IMPRESSION:\par \par Since 8/3/2021:\par \par New tree-in-bud in the left upper lobe and left lower lobe, consistent with impacted bronchioles, consider infectious bronchiolitis in the appropriate clinical setting.\par \par Stable airways disease with mosaic perfusion and mucous plugging within the lingula.\par \par Stable superimposed groundglass, predominantly affecting the upper lobes, which in the setting of possible lupus, may reflect pulmonary hemorrhage.\par \par Partially included mild nonspecific fat stranding in the left upper quadrant.\par \par \par \par --- End of Report ---\par \par \par \par \par \par \par KALPESH BHATIA M.D., ATTENDING RADIOGIST\par This document has been electronically signed. Dec 16 2021 5:11PM \par \par Electronically signed by : DELFINO WILKINS MD; Dec 20 2021 3:24PM EST (Author)\par \par ~~~~~~~~~~~~~~~~~~~~~~~~~~~~~~~~~~~~~~~~~~~~~~~~~~~~~~~~~~~~~~~~~~~~~~~~\par \par [facility]\par EXAM: CT CHEST\par \par \par PROCEDURE DATE: 08/23/2021\par \par \par \par INTERPRETATION: .\par ACC: [id number]\par INDICATION: Dyspnea, interstitial lung disease, SLE. On immunosuppressive>needs f/u of bilateral infiltrates.\par TECHNIQUE: Unenhanced CT of the chest. Coronal and sagittal images were reconstructed. Maximum intensity projection images were generated.\par COMPARISON: CT chest 3/27/2021\par \par FINDINGS:\par \par AIRWAYS, LUNGS and PLEURA: Patent central airways. Diffuse groundglass opacities and septal thickening are increased from 3/27/2021. Mild mucoid impaction and bronchiectasis within the lingula is unchanged. No pleural effusion.\par \par MEDIASTINUM AND HERNESTO: No lymphadenopathy.\par \par HEART AND VESSELS: Heart size is normal. No pericardial effusion. Thoracic aorta and pulmonary artery normal in diameter.\par \par VISUALIZED UPPER ABDOMEN: Small hiatal hernia.\par \par CHEST WALL AND BONES: No aggressive osseous lesion. Bilateral breast implants.\par \par LOWER NECK: Within normal limits.\par \par IMPRESSION:\par \par Diffuse groundglass opacities and septal thickening are increased from 3/27/2021.\par \par --- End of Report ---\par \par \par \par \par \par \par SAUL GUERRA MD; Attending Radiologist\par This document has been electronically signed. Aug 25 2021 10:23AM\par \par ~~~~~~~~~~~~~~~~~~~~~~~~~~~~~~~~~~~~~~~~~~~~~~~~~~~~~~~~~~~~~~~~~~~~~~~~\par \par [facility]\par EXAM: CT CHEST\par \par \par PROCEDURE DATE: 03/27/2021\par \par \par \par INTERPRETATION: EXAMINATION: CT CHEST\par \par CLINICAL INDICATION: Lung opacity.\par \par TECHNIQUE: Noncontrast CT of the chest was obtained.\par \par COMPARISON: 1/21/2021.\par \par FINDINGS:\par \par AIRWAYS AND LUNGS: The central tracheobronchial tree is patent. Bilateral ill-defined groundglass opacities and tiny nodules are decreased but not resolved compared to the prior study.\par \par MEDIASTINUM AND PLEURA: There are no enlarged mediastinal, hilar or axillary lymph nodes. The visualized portion of the thyroid gland is unremarkable. There is no pleural effusion. There is no pneumothorax.\par \par HEART AND VESSELS: The heart is normal in size. There are no atherosclerotic calcifications of the aorta. There is no pericardial effusion.\par \par UPPER ABDOMEN: Images of the upper abdomen demonstrate no abnormality.\par \par BONES AND SOFT TISSUES: The bones are unremarkable. The soft tissues are unremarkable.\par \par TUBES/LINES: None.\par \par IMPRESSION:\par Bilateral ill-defined groundglass opacities and tiny nodules are decreased but not resolved compared to the prior study. Continued follow-up CT recommended for complete evaluation.\par \par \par \par \par \par \par TARA ALMENDAREZ MD; Attending Radiologist\par This document has been electronically signed. Mar 30 2021 8:26PM\par \par ~~~~~~~~~~~~~~~~~~~~~~~~~~~~~~~~~~~~~~~~~~~~~~~~~~~~~~~~~~~~~~~~~~~~~~~~\par \par [facility]\par \par EXAM: CT CHEST\par \par \par PROCEDURE DATE: 01/21/2021\par \par \par \par INTERPRETATION: EXAMINATION: CT CHEST\par \par CLINICAL INDICATION: Abnormal chest x-ray.\par \par TECHNIQUE: Noncontrast CT of the chest was obtained.\par \par COMPARISON: None.\par \par FINDINGS:\par \par AIRWAYS AND LUNGS: The central tracheobronchial tree is patent. Bilateral ill-defined groundglass opacity and tiny nodules with subpleural sparing. Mosaic attenuation. No bronchiectasis or honeycombing.\par \par MEDIASTINUM AND PLEURA: There are no enlarged mediastinal, hilar or axillary lymph nodes. The visualized portion of the thyroid gland is unremarkable. There is no pleural effusion. There is no pneumothorax.\par \par HEART AND VESSELS: The heart is normal in size. There are no atherosclerotic calcifications of the aorta. There is no pericardial effusion.\par \par UPPER ABDOMEN: Images of the upper abdomen demonstrate slightly lobulated left kidney..\par \par BONES AND SOFT TISSUES: The bones are unremarkable. Bilateral breast implants.\par \par TUBES/LINES: None.\par \par IMPRESSION:\par Lung findings are new from cervical spine CT 1/15/2009 and are indeterminate. Interstitial lung disease should be considered.\par \par Slightly lobulated left kidney, ultrasound recommended for complete evaluation.\par \par \par \par \par \par \par TARA ALMENDAREZ MD; Attending Radiologist\par This document has been electronically signed. Jan 22 2021 8:25AM \par

## 2022-02-11 ENCOUNTER — APPOINTMENT (OUTPATIENT)
Dept: GASTROENTEROLOGY | Facility: CLINIC | Age: 57
End: 2022-02-11
Payer: MEDICARE

## 2022-02-11 VITALS
TEMPERATURE: 98.6 F | RESPIRATION RATE: 14 BRPM | BODY MASS INDEX: 25.86 KG/M2 | OXYGEN SATURATION: 98 % | DIASTOLIC BLOOD PRESSURE: 80 MMHG | WEIGHT: 137 LBS | HEART RATE: 84 BPM | HEIGHT: 61 IN | SYSTOLIC BLOOD PRESSURE: 124 MMHG

## 2022-02-11 PROCEDURE — 99214 OFFICE O/P EST MOD 30 MIN: CPT

## 2022-02-11 NOTE — ASSESSMENT
[FreeTextEntry1] : Impression: Chronic GERD well-controlled with current omeprazole famotidine therapies.\par \par Recommendations: Patient is to continue current omeprazole and famotidine therapies with a low-fat antireflux diet..  She will return here in 6 months time for follow-up evaluation or sooner if necessary and appeared to understand all of the above instructions, information, and management plan which were explained to her today in Chilean by myself who speaks Chilean.

## 2022-02-11 NOTE — HISTORY OF PRESENT ILLNESS
[None] : had no significant interval events [Heartburn] : improved heartburn [Nausea] : denies nausea [Vomiting] : denies vomiting [Diarrhea] : denies diarrhea [Constipation] : denies constipation [Yellow Skin Or Eyes (Jaundice)] : denies jaundice [Abdominal Pain] : denies abdominal pain [Abdominal Swelling] : denies abdominal swelling [Rectal Pain] : denies rectal pain [GERD] : gastroesophageal reflux disease [Abdominal Surgery] : abdominal surgery [Wt Gain ___ Lbs] : no recent weight gain [Wt Loss ___ Lbs] : no recent weight loss [Hiatus Hernia] : no hiatus hernia [Peptic Ulcer Disease] : no peptic ulcer disease [Pancreatitis] : no pancreatitis [Cholelithiasis] : no cholelithiasis [Kidney Stone] : no kidney stone [Inflammatory Bowel Disease] : no inflammatory bowel disease [Irritable Bowel Syndrome] : no irritable bowel syndrome [Diverticulitis] : no diverticulitis [Alcohol Abuse] : no alcohol abuse [Malignancy] : no malignancy [Appendectomy] : no appendectomy [Cholecystectomy] : no cholecystectomy [de-identified] : April 16 2021 [de-identified] : Patient presents for follow-up evaluation of chronic GERD presently well controlled with omeprazole 40 mg in the morning and famotidine 40 mg nightly with a negative previous upper endoscopy for chronic GERD.  She has little or no breakthrough symptoms or complaints with the above omeprazole and famotidine therapies.

## 2022-02-11 NOTE — HISTORY OF PRESENT ILLNESS
[None] : had no significant interval events [Heartburn] : improved heartburn [Nausea] : denies nausea [Vomiting] : denies vomiting [Diarrhea] : denies diarrhea [Constipation] : denies constipation [Yellow Skin Or Eyes (Jaundice)] : denies jaundice [Abdominal Pain] : denies abdominal pain [Abdominal Swelling] : denies abdominal swelling [Rectal Pain] : denies rectal pain [GERD] : gastroesophageal reflux disease [Abdominal Surgery] : abdominal surgery [Wt Gain ___ Lbs] : no recent weight gain [Wt Loss ___ Lbs] : no recent weight loss [Hiatus Hernia] : no hiatus hernia [Peptic Ulcer Disease] : no peptic ulcer disease [Pancreatitis] : no pancreatitis [Cholelithiasis] : no cholelithiasis [Kidney Stone] : no kidney stone [Inflammatory Bowel Disease] : no inflammatory bowel disease [Irritable Bowel Syndrome] : no irritable bowel syndrome [Diverticulitis] : no diverticulitis [Alcohol Abuse] : no alcohol abuse [Malignancy] : no malignancy [Appendectomy] : no appendectomy [Cholecystectomy] : no cholecystectomy [de-identified] : April 16 2021 [de-identified] : Patient presents for follow-up evaluation of chronic GERD presently well controlled with omeprazole 40 mg in the morning and famotidine 40 mg nightly with a negative previous upper endoscopy for chronic GERD.  She has little or no breakthrough symptoms or complaints with the above omeprazole and famotidine therapies.

## 2022-02-11 NOTE — ASSESSMENT
[FreeTextEntry1] : Impression: Chronic GERD well-controlled with current omeprazole famotidine therapies.\par \par Recommendations: Patient is to continue current omeprazole and famotidine therapies with a low-fat antireflux diet..  She will return here in 6 months time for follow-up evaluation or sooner if necessary and appeared to understand all of the above instructions, information, and management plan which were explained to her today in Cymro by myself who speaks Cymro.

## 2022-02-16 ENCOUNTER — APPOINTMENT (OUTPATIENT)
Dept: UROGYNECOLOGY | Facility: CLINIC | Age: 57
End: 2022-02-16
Payer: MEDICARE

## 2022-02-16 DIAGNOSIS — N81.11 CYSTOCELE, MIDLINE: ICD-10-CM

## 2022-02-16 DIAGNOSIS — N39.3 STRESS INCONTINENCE (FEMALE) (MALE): ICD-10-CM

## 2022-02-16 DIAGNOSIS — N81.6 RECTOCELE: ICD-10-CM

## 2022-02-16 DIAGNOSIS — R35.1 NOCTURIA: ICD-10-CM

## 2022-02-16 PROCEDURE — 99214 OFFICE O/P EST MOD 30 MIN: CPT

## 2022-02-16 NOTE — REASON FOR VISIT
[Follow-Up Visit_____] : a follow-up visit for [unfilled] [Family Member] : family member [Time Spent: ____ minutes] : Total time spent using  services: [unfilled] minutes. The patient's primary language is not English thus required  services. [Interpreters_IDNumber] : [id number] [Interpreters_FullName] : Juan David

## 2022-02-16 NOTE — DISCUSSION/SUMMARY
[FreeTextEntry1] : \par Cait presents with post hysterectomy POP and CASSANDRA. \par \par The patient presented to the office today for counseling regarding her decision for possible pelvic reconstructive surgery. All pertinent prior studies including urodynamic testing were reviewed. 						\par The patient was counseled regarding alternative non-surgical therapies as well as the prognosis with no intervention.\par \par The patient was advised regarding various surgical options including abdominal, robotic/laparoscopic and vaginal approaches. The risks and benefits of surgery using endogenous tissue only versus the use of graft insertion (mesh) were fully reviewed.  She was informed of the potential for improved durability and the inherent risk of graft use including but not limited to infection, erosion and chronic inflammation, acute and chronic pain, pain with intercourse (both of which may be refractory to treatment) fistula, cervical elongation, disturbance in bowel or bladder function, any of which may require additional surgery for mesh revision.  She is aware that the mesh used in her surgery is a permanent mesh.  The patient was advised regarding the July 2011 FDA notification regarding these issues and provided the website address for further reference www.fda.gov.  \par \par The general risks of the surgery were reviewed including, but not limited to infection, bleeding, including transfusion, surrounding organ or tissue injury (bladder, rectum, bowel, urethra, ureters, nerves vessels or muscles), failure meaning recurrent prolapse and/or continued leaking, voiding dysfunction, needing to go home with a catheter, pain with sex, blood clots, and anesthesia.\par \par The approximate length of the surgery, hospital stay and postoperative recovery period were reviewed, including a general overview of convalescence and postoperative follow-up.\par \par The patient is aware that learner’s (medical students/residents/fellows) may be participating in a pelvic exam under anesthesia.\par \par The patient verbalized a desire to proceed with the surgery.  Appropriate informed consent was obtained.  All questions were answered to the patient’s satisfaction.\par \par [facility] in Bulgarian gives\par \par [] pelvic exam under anesthesia robotic SCP/Sling/cysto, POSTERIOR REPAIR, POSSIBLE SALPINGECTOMY if tubes present\par \par

## 2022-02-16 NOTE — HISTORY OF PRESENT ILLNESS
[Cystocele (Obstetric)] : no [Vaginal Wall Prolapse] : no [Rectal Prolapse] : no [Unable To Restrain Bowel Movement] : mild [x2] : nocturia two times a night [Urinary Frequency] : no [Pain During Urination (Dysuria)] : no [Feelings Of Urinary Urgency] : no [Urinary Tract Infection] : moderate [Constipation Obstructed Defecation] : moderate [Incomplete Emptying Of Stool] : no [] : no [Pelvic Pain] : no [Vaginal Pain] : no [Vulvar Pain] : no [de-identified] : 4 [FreeTextEntry1] : \par Cait presents with post hysterectomy POP and CASSANDRA\par \par UDS with CASSANDRA/ISD\par cystoscopy negative \par \par she had a TVH, she has ovaries, unsure if has tubes

## 2022-02-17 ENCOUNTER — OUTPATIENT (OUTPATIENT)
Dept: OUTPATIENT SERVICES | Facility: HOSPITAL | Age: 57
LOS: 1 days | End: 2022-02-17
Payer: COMMERCIAL

## 2022-02-17 VITALS
OXYGEN SATURATION: 98 % | HEART RATE: 77 BPM | TEMPERATURE: 97 F | WEIGHT: 137.13 LBS | RESPIRATION RATE: 20 BRPM | DIASTOLIC BLOOD PRESSURE: 75 MMHG | SYSTOLIC BLOOD PRESSURE: 116 MMHG | HEIGHT: 62 IN

## 2022-02-17 DIAGNOSIS — Z90.710 ACQUIRED ABSENCE OF BOTH CERVIX AND UTERUS: Chronic | ICD-10-CM

## 2022-02-17 DIAGNOSIS — J84.9 INTERSTITIAL PULMONARY DISEASE, UNSPECIFIED: ICD-10-CM

## 2022-02-17 DIAGNOSIS — Z98.890 OTHER SPECIFIED POSTPROCEDURAL STATES: Chronic | ICD-10-CM

## 2022-02-17 DIAGNOSIS — Z98.82 BREAST IMPLANT STATUS: Chronic | ICD-10-CM

## 2022-02-17 DIAGNOSIS — N81.4 UTEROVAGINAL PROLAPSE, UNSPECIFIED: ICD-10-CM

## 2022-02-17 DIAGNOSIS — J45.909 UNSPECIFIED ASTHMA, UNCOMPLICATED: ICD-10-CM

## 2022-02-17 DIAGNOSIS — Z01.818 ENCOUNTER FOR OTHER PREPROCEDURAL EXAMINATION: ICD-10-CM

## 2022-02-17 DIAGNOSIS — Z98.890 OTHER SPECIFIED POSTPROCEDURAL STATES: ICD-10-CM

## 2022-02-17 DIAGNOSIS — U07.1 COVID-19: ICD-10-CM

## 2022-02-17 DIAGNOSIS — I10 ESSENTIAL (PRIMARY) HYPERTENSION: ICD-10-CM

## 2022-02-17 LAB
ALBUMIN SERPL ELPH-MCNC: 4.2 G/DL — SIGNIFICANT CHANGE UP (ref 3.3–5.2)
ALP SERPL-CCNC: 103 U/L — SIGNIFICANT CHANGE UP (ref 40–120)
ALT FLD-CCNC: 22 U/L — SIGNIFICANT CHANGE UP
ANION GAP SERPL CALC-SCNC: 12 MMOL/L — SIGNIFICANT CHANGE UP (ref 5–17)
APPEARANCE UR: CLEAR — SIGNIFICANT CHANGE UP
APTT BLD: 27.9 SEC — SIGNIFICANT CHANGE UP (ref 27.5–35.5)
AST SERPL-CCNC: 26 U/L — SIGNIFICANT CHANGE UP
BACTERIA # UR AUTO: ABNORMAL
BASOPHILS # BLD AUTO: 0.05 K/UL — SIGNIFICANT CHANGE UP (ref 0–0.2)
BASOPHILS NFR BLD AUTO: 0.9 % — SIGNIFICANT CHANGE UP (ref 0–2)
BILIRUB SERPL-MCNC: 0.3 MG/DL — LOW (ref 0.4–2)
BILIRUB UR-MCNC: NEGATIVE — SIGNIFICANT CHANGE UP
BUN SERPL-MCNC: 13.9 MG/DL — SIGNIFICANT CHANGE UP (ref 8–20)
CALCIUM SERPL-MCNC: 9 MG/DL — SIGNIFICANT CHANGE UP (ref 8.6–10.2)
CHLORIDE SERPL-SCNC: 107 MMOL/L — SIGNIFICANT CHANGE UP (ref 98–107)
CO2 SERPL-SCNC: 24 MMOL/L — SIGNIFICANT CHANGE UP (ref 22–29)
COLOR SPEC: YELLOW — SIGNIFICANT CHANGE UP
CREAT SERPL-MCNC: 0.8 MG/DL — SIGNIFICANT CHANGE UP (ref 0.5–1.3)
DIFF PNL FLD: ABNORMAL
EOSINOPHIL # BLD AUTO: 0.25 K/UL — SIGNIFICANT CHANGE UP (ref 0–0.5)
EOSINOPHIL NFR BLD AUTO: 4.6 % — SIGNIFICANT CHANGE UP (ref 0–6)
EPI CELLS # UR: SIGNIFICANT CHANGE UP
GLUCOSE SERPL-MCNC: 102 MG/DL — HIGH (ref 70–99)
GLUCOSE UR QL: NEGATIVE MG/DL — SIGNIFICANT CHANGE UP
HCT VFR BLD CALC: 43.9 % — SIGNIFICANT CHANGE UP (ref 34.5–45)
HGB BLD-MCNC: 14.1 G/DL — SIGNIFICANT CHANGE UP (ref 11.5–15.5)
IMM GRANULOCYTES NFR BLD AUTO: 0.4 % — SIGNIFICANT CHANGE UP (ref 0–1.5)
INR BLD: 0.94 RATIO — SIGNIFICANT CHANGE UP (ref 0.88–1.16)
KETONES UR-MCNC: NEGATIVE — SIGNIFICANT CHANGE UP
LEUKOCYTE ESTERASE UR-ACNC: NEGATIVE — SIGNIFICANT CHANGE UP
LYMPHOCYTES # BLD AUTO: 2.01 K/UL — SIGNIFICANT CHANGE UP (ref 1–3.3)
LYMPHOCYTES # BLD AUTO: 37.3 % — SIGNIFICANT CHANGE UP (ref 13–44)
MCHC RBC-ENTMCNC: 29.7 PG — SIGNIFICANT CHANGE UP (ref 27–34)
MCHC RBC-ENTMCNC: 32.1 GM/DL — SIGNIFICANT CHANGE UP (ref 32–36)
MCV RBC AUTO: 92.4 FL — SIGNIFICANT CHANGE UP (ref 80–100)
MONOCYTES # BLD AUTO: 0.4 K/UL — SIGNIFICANT CHANGE UP (ref 0–0.9)
MONOCYTES NFR BLD AUTO: 7.4 % — SIGNIFICANT CHANGE UP (ref 2–14)
NEUTROPHILS # BLD AUTO: 2.66 K/UL — SIGNIFICANT CHANGE UP (ref 1.8–7.4)
NEUTROPHILS NFR BLD AUTO: 49.4 % — SIGNIFICANT CHANGE UP (ref 43–77)
NITRITE UR-MCNC: NEGATIVE — SIGNIFICANT CHANGE UP
PH UR: 5 — SIGNIFICANT CHANGE UP (ref 5–8)
PLATELET # BLD AUTO: 238 K/UL — SIGNIFICANT CHANGE UP (ref 150–400)
POTASSIUM SERPL-MCNC: 4.4 MMOL/L — SIGNIFICANT CHANGE UP (ref 3.5–5.3)
POTASSIUM SERPL-SCNC: 4.4 MMOL/L — SIGNIFICANT CHANGE UP (ref 3.5–5.3)
PROT SERPL-MCNC: 7.2 G/DL — SIGNIFICANT CHANGE UP (ref 6.6–8.7)
PROT UR-MCNC: NEGATIVE — SIGNIFICANT CHANGE UP
PROTHROM AB SERPL-ACNC: 10.9 SEC — SIGNIFICANT CHANGE UP (ref 10.6–13.6)
RBC # BLD: 4.75 M/UL — SIGNIFICANT CHANGE UP (ref 3.8–5.2)
RBC # FLD: 13.2 % — SIGNIFICANT CHANGE UP (ref 10.3–14.5)
RBC CASTS # UR COMP ASSIST: ABNORMAL /HPF (ref 0–4)
SODIUM SERPL-SCNC: 143 MMOL/L — SIGNIFICANT CHANGE UP (ref 135–145)
SP GR SPEC: 1.02 — SIGNIFICANT CHANGE UP (ref 1.01–1.02)
UROBILINOGEN FLD QL: NEGATIVE MG/DL — SIGNIFICANT CHANGE UP
WBC # BLD: 5.39 K/UL — SIGNIFICANT CHANGE UP (ref 3.8–10.5)
WBC # FLD AUTO: 5.39 K/UL — SIGNIFICANT CHANGE UP (ref 3.8–10.5)
WBC UR QL: SIGNIFICANT CHANGE UP /HPF (ref 0–5)

## 2022-02-17 PROCEDURE — G0463: CPT

## 2022-02-17 PROCEDURE — 93010 ELECTROCARDIOGRAM REPORT: CPT

## 2022-02-17 PROCEDURE — 71046 X-RAY EXAM CHEST 2 VIEWS: CPT

## 2022-02-17 PROCEDURE — 71046 X-RAY EXAM CHEST 2 VIEWS: CPT | Mod: 26

## 2022-02-17 PROCEDURE — 93005 ELECTROCARDIOGRAM TRACING: CPT

## 2022-02-17 RX ORDER — CEFAZOLIN SODIUM 1 G
2000 VIAL (EA) INJECTION ONCE
Refills: 0 | Status: COMPLETED | OUTPATIENT
Start: 2022-03-09 | End: 2022-03-09

## 2022-02-17 NOTE — H&P PST ADULT - SOURCE OF INFORMATION, PROFILE
daughter Edith Westbrook/patient daughter Edith Westbrook interpreted at pt request speaks primarily Mauritanian/patient

## 2022-02-17 NOTE — H&P PST ADULT - ASSESSMENT
Pleasant 56 yr old female presents to pst with daughter at side , speaks Ukrainian daughter assisted with interpretation . Pt had history of , ILD, pulmonary clearance obtained with DR. Hurtado states breathing is good this week.  GERD,  ABNA POS  , Sjogrens  and constipation with urinary incontinence . Pt has cystocele with rectocele and is scheduled for repair. Will have PCR pre op had mild covid in 21 . medical clearance to be obtained.   OPIOID RISK TOOL    CABRERA EACH BOX THAT APPLIES AND ADD TOTALS AT THE END    FAMILY HISTORY OF SUBSTANCE ABUSE                 FEMALE         MALE                                                Alcohol                             [  ]1 pt          [  ]3pts                                               Illegal Durgs                     [  ]2 pts        [  ]3pts                                               Rx Drugs                           [  ]4 pts        [  ]4 pts    PERSONAL HISTORY OF SUBSTANCE ABUSE                                                                                          Alcohol                             [  ]3 pts       [  ]3 pts                                               Illegal Durgs                     [  ]4 pts        [  ]4 pts                                               Rx Drugs                           [  ]5 pts        [  ]5 pts    AGE BETWEEN 16-45 YEARS                                      [  ]1 pt         [  ]1 pt    HISTORY OF PREADOLESCENT   SEXUAL ABUSE                                                             [  ]3 pts        [  ]0pts    PSYCHOLOGICAL DISEASE                     ADD, OCD, Bipolar, Schizophrenia        [  ]2 pts         [  ]2 pts                      Depression                                               [  ]1 pt           [  ]1 pt           SCORING TOTAL   (add numbers and type here)              (**0*)                                     A score of 3 or lower indicated LOW risk for future opiod abuse  A score of 4 to 7 indicated moderate risk for future opiod abuse  A score of 8 or higher indicates a high risk for opiod abuse  CAPRINI VTE 2.0 SCORE [CLOT updated 2019]    AGE RELATED RISK FACTORS                                                       MOBILITY RELATED FACTORS  [X ] Age 41-60 years                                            (1 Point)                    [ ] Bed rest                                                        (1 Point)  [ ] Age: 61-74 years                                           (2 Points)                  [ ] Plaster cast                                                   (2 Points)  [ ] Age= 75 years                                              (3 Points)                    [ ] Bed bound for more than 72 hours                 (2 Points)    DISEASE RELATED RISK FACTORS                                               GENDER SPECIFIC FACTORS  [ ] Edema in the lower extremities                       (1 Point)              [ ] Pregnancy                                                     (1 Point)  [ ] Varicose veins                                               (1 Point)                     [ ] Post-partum < 6 weeks                                   (1 Point)             [X ] BMI > 25 Kg/m2                                            (1 Point)                     [ ] Hormonal therapy  or oral contraception          (1 Point)                 [ ] Sepsis (in the previous month)                        (1 Point)               [ ] History of pregnancy complications                 (1 point)  [ ] Pneumonia or serious lung disease                                               [ ] Unexplained or recurrent                     (1 Point)           (in the previous month)                               (1 Point)  [ ] Abnormal pulmonary function test                     (1 Point)                 SURGERY RELATED RISK FACTORS  [ ] Acute myocardial infarction                              (1 Point)               [ ]  Section                                             (1 Point)  [ ] Congestive heart failure (in the previous month)  (1 Point)      [ ] Minor surgery                                                  (1 Point)   [ ] Inflammatory bowel disease                             (1 Point)               [ ] Arthroscopic surgery                                        (2 Points)  [ ] Central venous access                                      (2 Points)                [X ] General surgery lasting more than 45 minutes (2 points)  [ ] Malignancy- Present or previous                   (2 Points)                [ ] Elective arthroplasty                                         (5 points)    [ ] Stroke (in the previous month)                          (5 Points)                                                                                                                                                           HEMATOLOGY RELATED FACTORS                                                 TRAUMA RELATED RISK FACTORS  [ ] Prior episodes of VTE                                     (3 Points)                [ ] Fracture of the hip, pelvis, or leg                       (5 Points)  [ ] Positive family history for VTE                         (3 Points)             [ ] Acute spinal cord injury (in the previous month)  (5 Points)  [ ] Prothrombin 42074 A                                     (3 Points)               [ ] Paralysis  (less than 1 month)                             (5 Points)  [ ] Factor V Leiden                                             (3 Points)                  [ ] Multiple Trauma within 1 month                        (5 Points)  [ ] Lupus anticoagulants                                     (3 Points)                                                           [ X] Anticardiolipin antibodies                               (3 Points)                                                       [ ] High homocysteine in the blood                      (3 Points)                                             [ ] Other congenital or acquired thrombophilia      (3 Points)                                                [ ] Heparin induced thrombocytopenia                  (3 Points)                                     Total Score [       7  ]

## 2022-02-17 NOTE — H&P PST ADULT - NSICDXFAMILYHX_GEN_ALL_CORE_FT
FAMILY HISTORY:  Father  Still living? Unknown  Family history of asthma, Age at diagnosis: Age Unknown    Mother  Still living? No  Family history of asthma, Age at diagnosis: Age Unknown  FH: CAD (coronary artery disease), Age at diagnosis: Age Unknown

## 2022-02-17 NOTE — H&P PST ADULT - NSICDXPASTSURGICALHX_GEN_ALL_CORE_FT
PAST SURGICAL HISTORY:  No significant past surgical history      PAST SURGICAL HISTORY:  H/O abdominoplasty     H/O bilateral breast implants     H/O vaginal hysterectomy 2012    S/P breast lumpectomy

## 2022-02-17 NOTE — H&P PST ADULT - HISTORY OF PRESENT ILLNESS
56 Occitan speaking female presents to PST with DAughter  Edith . Daughter assisted with interpreting , pt speaks primarily Occitan.  Pt has history of sjogrens , interstitial lung disease with history of  aster pulm infiltrates , CURLY pos, cervical stenosis,  cystocele , rectocele , pt had covid  9/12/21 treated at home mild, GERD, and c/o increased  urgency and urinary incontinence  with pressure and vaginal bulge over past 4 years.  Pt had hysterectomy in 2012 . Pt now presents for repair of prolapse c/o increased constipation  as well.

## 2022-02-17 NOTE — H&P PST ADULT - NSICDXPASTMEDICALHX_GEN_ALL_CORE_FT
PAST MEDICAL HISTORY:  No pertinent past medical history      PAST MEDICAL HISTORY:  2019 novel coronavirus disease (COVID-19) 9/12/21    Cervical spinal stenosis     Cystocele with prolapse     ILD (interstitial lung disease)     No pertinent past medical history     Positive CURLY (antinuclear antibody)     Rectocele     Sjogren's disease

## 2022-02-19 LAB
CULTURE RESULTS: SIGNIFICANT CHANGE UP
SPECIMEN SOURCE: SIGNIFICANT CHANGE UP

## 2022-03-08 ENCOUNTER — TRANSCRIPTION ENCOUNTER (OUTPATIENT)
Age: 57
End: 2022-03-08

## 2022-03-09 ENCOUNTER — INPATIENT (INPATIENT)
Facility: HOSPITAL | Age: 57
LOS: 0 days | Discharge: HOME | DRG: 744 | End: 2022-03-10
Attending: STUDENT IN AN ORGANIZED HEALTH CARE EDUCATION/TRAINING PROGRAM | Admitting: STUDENT IN AN ORGANIZED HEALTH CARE EDUCATION/TRAINING PROGRAM
Payer: COMMERCIAL

## 2022-03-09 ENCOUNTER — RESULT REVIEW (OUTPATIENT)
Age: 57
End: 2022-03-09

## 2022-03-09 ENCOUNTER — APPOINTMENT (OUTPATIENT)
Dept: UROGYNECOLOGY | Facility: HOSPITAL | Age: 57
End: 2022-03-09
Payer: MEDICARE

## 2022-03-09 VITALS
WEIGHT: 137.13 LBS | OXYGEN SATURATION: 99 % | DIASTOLIC BLOOD PRESSURE: 76 MMHG | SYSTOLIC BLOOD PRESSURE: 115 MMHG | HEART RATE: 72 BPM | TEMPERATURE: 98 F | HEIGHT: 62 IN | RESPIRATION RATE: 15 BRPM

## 2022-03-09 DIAGNOSIS — R35.0 FREQUENCY OF MICTURITION: ICD-10-CM

## 2022-03-09 DIAGNOSIS — R10.2 PELVIC AND PERINEAL PAIN: ICD-10-CM

## 2022-03-09 DIAGNOSIS — N39.3 STRESS INCONTINENCE (FEMALE) (MALE): ICD-10-CM

## 2022-03-09 DIAGNOSIS — Z90.710 ACQUIRED ABSENCE OF BOTH CERVIX AND UTERUS: Chronic | ICD-10-CM

## 2022-03-09 DIAGNOSIS — Z98.890 OTHER SPECIFIED POSTPROCEDURAL STATES: Chronic | ICD-10-CM

## 2022-03-09 DIAGNOSIS — Z98.82 BREAST IMPLANT STATUS: Chronic | ICD-10-CM

## 2022-03-09 PROCEDURE — 57425 LAPAROSCOPY SURG COLPOPEXY: CPT | Mod: 80

## 2022-03-09 PROCEDURE — 57250 REPAIR RECTUM & VAGINA: CPT | Mod: 80

## 2022-03-09 PROCEDURE — 57288 REPAIR BLADDER DEFECT: CPT

## 2022-03-09 PROCEDURE — 88302 TISSUE EXAM BY PATHOLOGIST: CPT | Mod: 26

## 2022-03-09 DEVICE — IMPLANTABLE DEVICE: Type: IMPLANTABLE DEVICE | Status: FUNCTIONAL

## 2022-03-09 DEVICE — SLING TRANSVAGINAL ADVANTAGE FIT EA: Type: IMPLANTABLE DEVICE | Status: FUNCTIONAL

## 2022-03-09 DEVICE — HEMOSTAT ARISTA 3GR: Type: IMPLANTABLE DEVICE | Status: FUNCTIONAL

## 2022-03-09 DEVICE — MESH UPSYLON Y: Type: IMPLANTABLE DEVICE | Status: FUNCTIONAL

## 2022-03-09 RX ORDER — CYCLOSPORINE 0.5 MG/ML
1 EMULSION OPHTHALMIC
Qty: 0 | Refills: 0 | DISCHARGE

## 2022-03-09 RX ORDER — ACETAMINOPHEN 500 MG
975 TABLET ORAL ONCE
Refills: 0 | Status: COMPLETED | OUTPATIENT
Start: 2022-03-09 | End: 2022-03-09

## 2022-03-09 RX ORDER — VALSARTAN 80 MG/1
1 TABLET ORAL
Qty: 0 | Refills: 0 | DISCHARGE

## 2022-03-09 RX ORDER — ACETAMINOPHEN 500 MG
975 TABLET ORAL EVERY 6 HOURS
Refills: 0 | Status: DISCONTINUED | OUTPATIENT
Start: 2022-03-09 | End: 2022-03-10

## 2022-03-09 RX ORDER — POLYETHYLENE GLYCOL 3350 17 G/17G
17 POWDER, FOR SOLUTION ORAL AT BEDTIME
Refills: 0 | Status: DISCONTINUED | OUTPATIENT
Start: 2022-03-09 | End: 2022-03-10

## 2022-03-09 RX ORDER — ENOXAPARIN SODIUM 100 MG/ML
40 INJECTION SUBCUTANEOUS EVERY 24 HOURS
Refills: 0 | Status: DISCONTINUED | OUTPATIENT
Start: 2022-03-10 | End: 2022-03-10

## 2022-03-09 RX ORDER — SODIUM CHLORIDE 9 MG/ML
3 INJECTION INTRAMUSCULAR; INTRAVENOUS; SUBCUTANEOUS ONCE
Refills: 0 | Status: DISCONTINUED | OUTPATIENT
Start: 2022-03-09 | End: 2022-03-09

## 2022-03-09 RX ORDER — SODIUM CHLORIDE 9 MG/ML
1000 INJECTION, SOLUTION INTRAVENOUS
Refills: 0 | Status: DISCONTINUED | OUTPATIENT
Start: 2022-03-09 | End: 2022-03-10

## 2022-03-09 RX ORDER — ACETAMINOPHEN 500 MG
1000 TABLET ORAL EVERY 6 HOURS
Refills: 0 | Status: DISCONTINUED | OUTPATIENT
Start: 2022-03-09 | End: 2022-03-10

## 2022-03-09 RX ORDER — PANTOPRAZOLE SODIUM 20 MG/1
40 TABLET, DELAYED RELEASE ORAL
Refills: 0 | Status: DISCONTINUED | OUTPATIENT
Start: 2022-03-09 | End: 2022-03-10

## 2022-03-09 RX ORDER — MYCOPHENOLATE MOFETIL 250 MG/1
500 CAPSULE ORAL
Refills: 0 | Status: DISCONTINUED | OUTPATIENT
Start: 2022-03-09 | End: 2022-03-10

## 2022-03-09 RX ORDER — KETOROLAC TROMETHAMINE 30 MG/ML
30 SYRINGE (ML) INJECTION EVERY 8 HOURS
Refills: 0 | Status: DISCONTINUED | OUTPATIENT
Start: 2022-03-09 | End: 2022-03-10

## 2022-03-09 RX ORDER — ALBUTEROL 90 UG/1
1 AEROSOL, METERED ORAL EVERY 6 HOURS
Refills: 0 | Status: DISCONTINUED | OUTPATIENT
Start: 2022-03-09 | End: 2022-03-10

## 2022-03-09 RX ORDER — IPRATROPIUM/ALBUTEROL SULFATE 18-103MCG
3 AEROSOL WITH ADAPTER (GRAM) INHALATION ONCE
Refills: 0 | Status: DISCONTINUED | OUTPATIENT
Start: 2022-03-09 | End: 2022-03-09

## 2022-03-09 RX ORDER — HYDROMORPHONE HYDROCHLORIDE 2 MG/ML
0.5 INJECTION INTRAMUSCULAR; INTRAVENOUS; SUBCUTANEOUS
Refills: 0 | Status: DISCONTINUED | OUTPATIENT
Start: 2022-03-09 | End: 2022-03-09

## 2022-03-09 RX ORDER — ONDANSETRON 8 MG/1
8 TABLET, FILM COATED ORAL EVERY 8 HOURS
Refills: 0 | Status: DISCONTINUED | OUTPATIENT
Start: 2022-03-09 | End: 2022-03-10

## 2022-03-09 RX ORDER — OXYCODONE HYDROCHLORIDE 5 MG/1
10 TABLET ORAL EVERY 4 HOURS
Refills: 0 | Status: DISCONTINUED | OUTPATIENT
Start: 2022-03-09 | End: 2022-03-10

## 2022-03-09 RX ORDER — SIMETHICONE 80 MG/1
80 TABLET, CHEWABLE ORAL EVERY 6 HOURS
Refills: 0 | Status: DISCONTINUED | OUTPATIENT
Start: 2022-03-09 | End: 2022-03-10

## 2022-03-09 RX ORDER — CEFAZOLIN SODIUM 1 G
1000 VIAL (EA) INJECTION EVERY 8 HOURS
Refills: 0 | Status: COMPLETED | OUTPATIENT
Start: 2022-03-09 | End: 2022-03-09

## 2022-03-09 RX ORDER — BUDESONIDE AND FORMOTEROL FUMARATE DIHYDRATE 160; 4.5 UG/1; UG/1
2 AEROSOL RESPIRATORY (INHALATION)
Refills: 0 | Status: DISCONTINUED | OUTPATIENT
Start: 2022-03-09 | End: 2022-03-10

## 2022-03-09 RX ORDER — FAMOTIDINE 10 MG/ML
1 INJECTION INTRAVENOUS
Qty: 0 | Refills: 0 | DISCHARGE

## 2022-03-09 RX ADMIN — Medication 975 MILLIGRAM(S): at 10:08

## 2022-03-09 RX ADMIN — Medication 975 MILLIGRAM(S): at 23:26

## 2022-03-09 RX ADMIN — OXYCODONE HYDROCHLORIDE 10 MILLIGRAM(S): 5 TABLET ORAL at 22:48

## 2022-03-09 RX ADMIN — POLYETHYLENE GLYCOL 3350 17 GRAM(S): 17 POWDER, FOR SOLUTION ORAL at 21:40

## 2022-03-09 RX ADMIN — OXYCODONE HYDROCHLORIDE 10 MILLIGRAM(S): 5 TABLET ORAL at 19:15

## 2022-03-09 RX ADMIN — MYCOPHENOLATE MOFETIL 500 MILLIGRAM(S): 250 CAPSULE ORAL at 18:29

## 2022-03-09 RX ADMIN — Medication 975 MILLIGRAM(S): at 19:20

## 2022-03-09 RX ADMIN — Medication 30 MILLIGRAM(S): at 22:03

## 2022-03-09 RX ADMIN — Medication 100 MILLIGRAM(S): at 12:08

## 2022-03-09 RX ADMIN — Medication 100 MILLIGRAM(S): at 22:50

## 2022-03-09 RX ADMIN — OXYCODONE HYDROCHLORIDE 10 MILLIGRAM(S): 5 TABLET ORAL at 23:33

## 2022-03-09 RX ADMIN — Medication 3 MILLILITER(S): at 10:08

## 2022-03-09 RX ADMIN — Medication 1 DROP(S): at 21:41

## 2022-03-09 RX ADMIN — OXYCODONE HYDROCHLORIDE 10 MILLIGRAM(S): 5 TABLET ORAL at 18:29

## 2022-03-09 RX ADMIN — Medication 30 MILLIGRAM(S): at 21:38

## 2022-03-09 RX ADMIN — Medication 975 MILLIGRAM(S): at 18:29

## 2022-03-09 NOTE — PATIENT PROFILE ADULT - FALL HARM RISK - HARM RISK INTERVENTIONS

## 2022-03-09 NOTE — BRIEF OPERATIVE NOTE - OPERATION/FINDINGS
Grossly normal appearing fallopian tubes bilaterally. Bilateral ureteral jets noted bilaterally on cystoscopy.

## 2022-03-09 NOTE — BRIEF OPERATIVE NOTE - NSICDXBRIEFPREOP_GEN_ALL_CORE_FT
PRE-OP DIAGNOSIS:  Stress incontinence 09-Mar-2022 15:23:50  Rimpel, Katherinne   PRE-OP DIAGNOSIS:  Stress incontinence 09-Mar-2022 15:23:50  Rimpel, Katherinne  Vaginal vault prolapse after hysterectomy 09-Mar-2022 20:39:30  Rimpel, Katherinne

## 2022-03-09 NOTE — BRIEF OPERATIVE NOTE - NSICDXBRIEFPROCEDURE_GEN_ALL_CORE_FT
PROCEDURES:  Sacrocolpopexy using robotic assistance 09-Mar-2022 15:23:06  Rimpel, Katherinne  Posterior vaginal repair 09-Mar-2022 15:23:19  Rimpel, Katherinne  Creation, midurethral sling, retropubic approach 09-Mar-2022 15:23:30  Rimpel, Katherinne   PROCEDURES:  Sacrocolpopexy using robotic assistance 09-Mar-2022 15:23:06  Rimpel, Katherinne  Posterior vaginal repair 09-Mar-2022 15:23:19  Rimpel, Katherinne  Creation, midurethral sling, retropubic approach 09-Mar-2022 15:23:30  Rimpel, Katherinne  Cystoscopy 09-Mar-2022 20:41:19  Rimpel, Katherinne

## 2022-03-09 NOTE — BRIEF OPERATIVE NOTE - NSICDXBRIEFPOSTOP_GEN_ALL_CORE_FT
POST-OP DIAGNOSIS:  Vaginal vault prolapse after hysterectomy 09-Mar-2022 20:39:19  Rimpel, Katherinne  Stress incontinence 09-Mar-2022 20:39:49  Rimpel, Katherinne

## 2022-03-10 ENCOUNTER — TRANSCRIPTION ENCOUNTER (OUTPATIENT)
Age: 57
End: 2022-03-10

## 2022-03-10 VITALS
DIASTOLIC BLOOD PRESSURE: 72 MMHG | SYSTOLIC BLOOD PRESSURE: 113 MMHG | RESPIRATION RATE: 16 BRPM | OXYGEN SATURATION: 90 % | TEMPERATURE: 98 F | HEART RATE: 70 BPM

## 2022-03-10 LAB
ANION GAP SERPL CALC-SCNC: 12 MMOL/L — SIGNIFICANT CHANGE UP (ref 5–17)
BUN SERPL-MCNC: 15.7 MG/DL — SIGNIFICANT CHANGE UP (ref 8–20)
CALCIUM SERPL-MCNC: 8.1 MG/DL — LOW (ref 8.6–10.2)
CHLORIDE SERPL-SCNC: 104 MMOL/L — SIGNIFICANT CHANGE UP (ref 98–107)
CO2 SERPL-SCNC: 22 MMOL/L — SIGNIFICANT CHANGE UP (ref 22–29)
CREAT SERPL-MCNC: 0.85 MG/DL — SIGNIFICANT CHANGE UP (ref 0.5–1.3)
EGFR: 79 ML/MIN/1.73M2 — SIGNIFICANT CHANGE UP
GLUCOSE SERPL-MCNC: 126 MG/DL — HIGH (ref 70–99)
HCT VFR BLD CALC: 35.8 % — SIGNIFICANT CHANGE UP (ref 34.5–45)
HGB BLD-MCNC: 11.8 G/DL — SIGNIFICANT CHANGE UP (ref 11.5–15.5)
MCHC RBC-ENTMCNC: 30.5 PG — SIGNIFICANT CHANGE UP (ref 27–34)
MCHC RBC-ENTMCNC: 33 GM/DL — SIGNIFICANT CHANGE UP (ref 32–36)
MCV RBC AUTO: 92.5 FL — SIGNIFICANT CHANGE UP (ref 80–100)
PLATELET # BLD AUTO: 243 K/UL — SIGNIFICANT CHANGE UP (ref 150–400)
POTASSIUM SERPL-MCNC: 4.5 MMOL/L — SIGNIFICANT CHANGE UP (ref 3.5–5.3)
POTASSIUM SERPL-SCNC: 4.5 MMOL/L — SIGNIFICANT CHANGE UP (ref 3.5–5.3)
RBC # BLD: 3.87 M/UL — SIGNIFICANT CHANGE UP (ref 3.8–5.2)
RBC # FLD: 13.3 % — SIGNIFICANT CHANGE UP (ref 10.3–14.5)
SODIUM SERPL-SCNC: 137 MMOL/L — SIGNIFICANT CHANGE UP (ref 135–145)
WBC # BLD: 11.63 K/UL — HIGH (ref 3.8–10.5)
WBC # FLD AUTO: 11.63 K/UL — HIGH (ref 3.8–10.5)

## 2022-03-10 PROCEDURE — C1771: CPT

## 2022-03-10 PROCEDURE — 88302 TISSUE EXAM BY PATHOLOGIST: CPT

## 2022-03-10 PROCEDURE — 94640 AIRWAY INHALATION TREATMENT: CPT

## 2022-03-10 PROCEDURE — 80048 BASIC METABOLIC PNL TOTAL CA: CPT

## 2022-03-10 PROCEDURE — C1781: CPT

## 2022-03-10 PROCEDURE — 36415 COLL VENOUS BLD VENIPUNCTURE: CPT

## 2022-03-10 PROCEDURE — C1889: CPT

## 2022-03-10 PROCEDURE — 85027 COMPLETE CBC AUTOMATED: CPT

## 2022-03-10 PROCEDURE — S2900: CPT

## 2022-03-10 RX ORDER — OXYCODONE HYDROCHLORIDE 5 MG/1
1 TABLET ORAL
Qty: 6 | Refills: 0
Start: 2022-03-10 | End: 2022-03-11

## 2022-03-10 RX ORDER — ALBUTEROL 90 UG/1
2 AEROSOL, METERED ORAL
Qty: 0 | Refills: 0 | DISCHARGE

## 2022-03-10 RX ADMIN — Medication 1 DROP(S): at 11:28

## 2022-03-10 RX ADMIN — Medication 975 MILLIGRAM(S): at 00:00

## 2022-03-10 RX ADMIN — PANTOPRAZOLE SODIUM 40 MILLIGRAM(S): 20 TABLET, DELAYED RELEASE ORAL at 06:11

## 2022-03-10 RX ADMIN — Medication 5 MILLIGRAM(S): at 06:10

## 2022-03-10 RX ADMIN — OXYCODONE HYDROCHLORIDE 10 MILLIGRAM(S): 5 TABLET ORAL at 10:31

## 2022-03-10 RX ADMIN — ENOXAPARIN SODIUM 40 MILLIGRAM(S): 100 INJECTION SUBCUTANEOUS at 06:11

## 2022-03-10 RX ADMIN — MYCOPHENOLATE MOFETIL 500 MILLIGRAM(S): 250 CAPSULE ORAL at 06:10

## 2022-03-10 RX ADMIN — Medication 975 MILLIGRAM(S): at 06:10

## 2022-03-10 RX ADMIN — OXYCODONE HYDROCHLORIDE 10 MILLIGRAM(S): 5 TABLET ORAL at 11:19

## 2022-03-10 RX ADMIN — Medication 30 MILLIGRAM(S): at 06:11

## 2022-03-10 NOTE — PROGRESS NOTE ADULT - ASSESSMENT
A/P: POD#1 s/p Robotic sacrocolpopexy, posterior repair, midurethral sling, cystoscopyNeuro: Pain well controlled. Continue current pain regimen.  CV: hx HTN valsartan ordered. Blood pressure well controlled.  Pulm: Interstitial lung disease. Saturating well on room air. Incentive spirometer use encouraged  GI: No active disease. Bowel sounds and function normal, tolerating PO diet. Continue current bowel regimen.   : Roberts to be removed, active TOV pending.  Heme: Hgb 14.1 -> AM labs pending  ID: Afebrile. No antibiotics indicated at this time.   FEN: IVF at 125cc will dc. Pt tolerating PO will dc this AM  DVT ppx: Ambulation encouraged, SCDs when in bed, lovenox ordered.  Dispo: Pending labs and AM rounds.    A/P: POD#1 s/p Robotic sacrocolpopexy, posterior repair, midurethral sling, cystoscopy  Neuro: Pain well controlled. Continue current pain regimen.  CV: hx HTN valsartan ordered. Blood pressure well controlled.  Pulm: Interstitial lung disease. Saturating well on room air. Incentive spirometer use encouraged  GI: No active disease. Bowel sounds and function normal, tolerating PO diet. Continue current bowel regimen.   : Roberts to be removed, active TOV pending.  Heme: Hgb 14.1 -> AM labs pending  ID: Afebrile. No antibiotics indicated at this time.   FEN: IVF at 125cc will dc. Pt tolerating PO will dc this AM  DVT ppx: Ambulation encouraged, SCDs when in bed, lovenox ordered.  Dispo: Pending labs and AM rounds.

## 2022-03-10 NOTE — DISCHARGE NOTE PROVIDER - NSDCCPCAREPLAN_GEN_ALL_CORE_FT
PRINCIPAL DISCHARGE DIAGNOSIS  Diagnosis: Vaginal vault prolapse after hysterectomy  Assessment and Plan of Treatment:

## 2022-03-10 NOTE — DISCHARGE NOTE PROVIDER - HOSPITAL COURSE
PRASHANT MASON is a 57y now POD#1 s/p Robotic sacrocolpopexy, posterior repair, midurethral sling, cystoscopy  Patient post-operatively had an uncomplicated hospital course. Her pain was well controlled.   She is tolerating a regular diet. She is ambulating independently. She was able to void after removal of dobbins.   Labs and Vitals WNL upon discharge.

## 2022-03-10 NOTE — DISCHARGE NOTE PROVIDER - NSDCCPTREATMENT_GEN_ALL_CORE_FT
PRINCIPAL PROCEDURE  Procedure: Sacrocolpopexy using robotic assistance  Findings and Treatment:       SECONDARY PROCEDURE  Procedure: Creation, midurethral sling, retropubic approach  Findings and Treatment:

## 2022-03-10 NOTE — DISCHARGE NOTE PROVIDER - CARE PROVIDER_API CALL
Mony Sommer)  Obstetrics and Gynecology  [address] [place] [address]  Phone: [phone]  Fax: [phone]  Follow Up Time:

## 2022-03-10 NOTE — DISCHARGE NOTE PROVIDER - NSDCFUADDINST_GEN_ALL_CORE_FT
May walk and climb stairs. No vigorous activity. Do not lift anything greater than 10lbs. Nothing per vagina x 6 weeks. Do not drive while on pain medication.

## 2022-03-10 NOTE — PROGRESS NOTE ADULT - SUBJECTIVE AND OBJECTIVE BOX
PRASHANT MASON is a 57y now POD#1 s/p Robotic sacrocolpopexy, posterior repair, midurethral sling, cystoscopy    S:    No acute events overnight.   Patient was seen and examined at bedside.   Patient has mild abdominal pain and leg cramps b/l this AM. Eye pain improved overnight after artificial tears.  Pain is well controlled with current treatment regimen.   Tolerating regular diet, denies N/V.   Ambulating without difficulty.   + flatus/-BM/TOV pending, 1100cc in dobbins bag  She denies lightheadedness, dizziness, palpitations, chest pain and SOB.     O:   T(C): 36.7 (03-10-22 @ 05:00), Max: 36.8 (03-09-22 @ 21:57)  HR: 71 (03-10-22 @ 05:00) (71 - 86)  BP: 93/57 (03-10-22 @ 05:00) (93/57 - 134/84)  RR: 18 (03-10-22 @ 05:00) (12 - 18)  SpO2: 92% (03-10-22 @ 05:00) (92% - 100%)    Gen: NAD, AAOx3  CV: RRR, S1 S2 present  Pulm: CTAB  Abdomen: Soft, nondistended, appropriately tender, + BS   Pelvic: Pad inspected with scant blood  Incisions: Clean, dry and intact (5 port sites) with dermabond  Extremities: No calf tenderness or edema     Labs:       03-09-22 @ 07:01  -  03-10-22 @ 06:38  --------------------------------------------------------  IN: 240 mL / OUT: 500 mL / NET: -260 mL               PRASHANT MASON is a 57y now POD#1 s/p Robotic sacrocolpopexy, posterior repair, midurethral sling, cystoscopy    S:    No acute events overnight.   Patient was seen and examined at bedside.   Patient has mild abdominal pain and leg cramps b/l this AM. Eye pain improved overnight after artificial tears.  Pain is well controlled with current treatment regimen.   Tolerating regular diet, denies N/V.   Ambulating without difficulty.   + flatus/-BM/TOV pending, 1100cc in dobbins bag  She denies lightheadedness, dizziness, palpitations, chest pain and SOB.     O:   T(C): 36.7 (03-10-22 @ 05:00), Max: 36.8 (03-09-22 @ 21:57)  HR: 71 (03-10-22 @ 05:00) (71 - 86)  BP: 93/57 (03-10-22 @ 05:00) (93/57 - 134/84)  RR: 18 (03-10-22 @ 05:00) (12 - 18)  SpO2: 92% (03-10-22 @ 05:00) (92% - 100%)    Gen: NAD, AAOx3  CV: RRR, S1 S2 present  Pulm: CTAB  Abdomen: Soft, nondistended, appropriately tender, + BS   Pelvic: Pad inspected with scant blood  Incisions: Clean, dry and intact (5 port sites) with dermabond  Extremities: No calf tenderness or edema   VE - no active bleeding    Labs:       03-09-22 @ 07:01  -  03-10-22 @ 06:38  --------------------------------------------------------  IN: 240 mL / OUT: 500 mL / NET: -260 mL

## 2022-03-10 NOTE — DISCHARGE NOTE PROVIDER - NSDCFUSCHEDAPPT_GEN_ALL_CORE_FT
PRASHANT MASON ; 03/22/2022 ; NPP Urogyn [address] [facility]  PRASHANT MASON ; 04/01/2022 ; NPP PulmMed [address]  PRASHANT MASON ; 04/12/2022 ; NPP Rheum [address]  PRASHANT MASON ; 04/26/2022 ; NPP Urogyn [address] [facility]

## 2022-03-10 NOTE — DISCHARGE NOTE NURSING/CASE MANAGEMENT/SOCIAL WORK - PATIENT PORTAL LINK FT
You can access the FollowMyHealth Patient Portal offered by [facility] by registering at the following website: [email or web address]. By joining [employer]’s FollowMyHealth portal, you will also be able to view your health information using other applications (apps) compatible with our system.

## 2022-03-10 NOTE — DISCHARGE NOTE NURSING/CASE MANAGEMENT/SOCIAL WORK - NSDCPEFALRISK_GEN_ALL_CORE
For information on Fall & Injury Prevention, visit: [email or web address] OR  [email or web address] OR  https://www.cdc.gov/steadi/patient.html

## 2022-03-10 NOTE — DISCHARGE NOTE PROVIDER - NSDCMRMEDTOKEN_GEN_ALL_CORE_FT
Advil 200 mg oral tablet: 1 tab(s) orally every 6 hours  Breo Ellipta 100 mcg-25 mcg/inh inhalation powder: 1 puff(s) inhaled once a day  mycophenolate mofetil 500 mg oral tablet: 2 tab(s) orally 2 times a day  omeprazole 40 mg oral delayed release capsule: 1 cap(s) orally once a day  oxyCODONE 5 mg oral tablet: 1 tab(s) orally every 8 hours MDD:3 tabs  predniSONE 5 mg oral tablet: 7.5 milligram(s) orally once a day

## 2022-03-17 LAB — SURGICAL PATHOLOGY STUDY: SIGNIFICANT CHANGE UP

## 2022-03-22 ENCOUNTER — RESULT CHARGE (OUTPATIENT)
Age: 57
End: 2022-03-22

## 2022-03-22 ENCOUNTER — APPOINTMENT (OUTPATIENT)
Dept: UROGYNECOLOGY | Facility: CLINIC | Age: 57
End: 2022-03-22
Payer: MEDICARE

## 2022-03-22 VITALS — SYSTOLIC BLOOD PRESSURE: 114 MMHG | DIASTOLIC BLOOD PRESSURE: 73 MMHG | RESPIRATION RATE: 21 BRPM

## 2022-03-22 VITALS — OXYGEN SATURATION: 97 %

## 2022-03-22 DIAGNOSIS — Z98.890 OTHER SPECIFIED POSTPROCEDURAL STATES: ICD-10-CM

## 2022-03-22 LAB
BILIRUB UR QL STRIP: NEGATIVE
CLARITY UR: CLEAR
COLLECTION METHOD: NORMAL
GLUCOSE UR-MCNC: NEGATIVE
HCG UR QL: 0.2 EU/DL
HGB UR QL STRIP.AUTO: NORMAL
KETONES UR-MCNC: NEGATIVE
LEUKOCYTE ESTERASE UR QL STRIP: NORMAL
NITRITE UR QL STRIP: NEGATIVE
PH UR STRIP: 7
PROT UR STRIP-MCNC: NEGATIVE
SP GR UR STRIP: 1.02

## 2022-03-22 PROCEDURE — 99024 POSTOP FOLLOW-UP VISIT: CPT

## 2022-03-22 NOTE — OBJECTIVE
[Post Void Residual ____ ml] : Post Void Residual was [unfilled] ml [Soft and Nontender] : soft and nontender [Clean, Dry, Intact] : Clean, Dry, Intact [Good Support] : Good support [FreeTextEntry3] : vagina sutures intact an no active bleeding ,no exposure on anterior incision line posterior suture line intact no bleeding noted.

## 2022-03-22 NOTE — DISCUSSION/SUMMARY
[Post-Op instructions given. Pt/family verbalizes understanding] : post-operative instructions were provided to the patient/family who verbalize understanding [FreeTextEntry1] : pelvic rest \par no heavy lifting pushing or pulling

## 2022-03-22 NOTE — ASSESSMENT
[FreeTextEntry1] : 58 y/o female doing well we reviewed op report no current questions \par  discussed taking Mag O7 for bowels no suppositories. \par followup in 4 weeks \par call if need arises pt agree and understands.

## 2022-03-22 NOTE — SUBJECTIVE
[FreeTextEntry1] : feeling better  [FreeTextEntry8] : no changes  [FreeTextEntry7] : denied  [FreeTextEntry6] : ok [FreeTextEntry5] : normal urination , feels like empty well, no dysuria , nocturia x 1, no foul smell.  [FreeTextEntry4] : +, some discomfort with bm but getting better pt taking miralax  [FreeTextEntry3] : good [FreeTextEntry2] : ok

## 2022-03-22 NOTE — REASON FOR VISIT
[Follow-up Visit ___] : a follow-up visit  for [unfilled] [Family Member] : family member [Other: _____] : [unfilled] [[employer] ] : provided by [employer]   [Interpreters_IDNumber] : [id number] [Interpreters_FullName] : Shandra  [TWNoteComboBox1] : [place]

## 2022-03-28 LAB
ALBUMIN MFR SERPL ELPH: 56.2 %
ALBUMIN SERPL ELPH-MCNC: 4.2 G/DL
ALBUMIN SERPL ELPH-MCNC: 4.3 G/DL
ALBUMIN SERPL ELPH-MCNC: 4.4 G/DL
ALBUMIN SERPL ELPH-MCNC: 4.4 G/DL
ALBUMIN SERPL-MCNC: 4.1 G/DL
ALBUMIN/GLOB SERPL: 1.3 RATIO
ALP BLD-CCNC: 106 U/L
ALP BLD-CCNC: 118 U/L
ALP BLD-CCNC: 122 U/L
ALP BLD-CCNC: 124 U/L
ALPHA1 GLOB MFR SERPL ELPH: 4.4 %
ALPHA1 GLOB SERPL ELPH-MCNC: 0.3 G/DL
ALPHA2 GLOB MFR SERPL ELPH: 10.6 %
ALPHA2 GLOB SERPL ELPH-MCNC: 0.8 G/DL
ALT SERPL-CCNC: 21 U/L
ALT SERPL-CCNC: 30 U/L
ALT SERPL-CCNC: 31 U/L
ALT SERPL-CCNC: 51 U/L
ANION GAP SERPL CALC-SCNC: 10 MMOL/L
ANION GAP SERPL CALC-SCNC: 11 MMOL/L
ANION GAP SERPL CALC-SCNC: 12 MMOL/L
ANION GAP SERPL CALC-SCNC: 13 MMOL/L
APPEARANCE: CLEAR
AST SERPL-CCNC: 21 U/L
AST SERPL-CCNC: 25 U/L
AST SERPL-CCNC: 27 U/L
AST SERPL-CCNC: 29 U/L
B-GLOBULIN MFR SERPL ELPH: 11.7 %
B-GLOBULIN SERPL ELPH-MCNC: 0.9 G/DL
BACTERIA: NEGATIVE
BASOPHILS # BLD AUTO: 0.05 K/UL
BASOPHILS # BLD AUTO: 0.06 K/UL
BASOPHILS NFR BLD AUTO: 0.6 %
BASOPHILS NFR BLD AUTO: 0.7 %
BASOPHILS NFR BLD AUTO: 0.8 %
BASOPHILS NFR BLD AUTO: 0.8 %
BILIRUB SERPL-MCNC: 0.2 MG/DL
BILIRUB SERPL-MCNC: 0.3 MG/DL
BILIRUB SERPL-MCNC: 0.4 MG/DL
BILIRUB SERPL-MCNC: <0.2 MG/DL
BILIRUBIN URINE: NEGATIVE
BLOOD URINE: NEGATIVE
BUN SERPL-MCNC: 12 MG/DL
BUN SERPL-MCNC: 13 MG/DL
BUN SERPL-MCNC: 14 MG/DL
BUN SERPL-MCNC: 14 MG/DL
C3 SERPL-MCNC: 142 MG/DL
C4 SERPL-MCNC: 25 MG/DL
CALCIUM ?TM UR-MCNC: 12.3 MG/DL
CALCIUM SERPL-MCNC: 10 MG/DL
CALCIUM SERPL-MCNC: 9.6 MG/DL
CALCIUM SERPL-MCNC: 9.6 MG/DL
CALCIUM SERPL-MCNC: 9.9 MG/DL
CALCIUM/CREAT UR: 0.2 RATIO
CELLS.CD3-CD19+/CELLS IN BLOOD: 12 %
CHLORIDE SERPL-SCNC: 104 MMOL/L
CHLORIDE SERPL-SCNC: 105 MMOL/L
CHLORIDE SERPL-SCNC: 106 MMOL/L
CHLORIDE SERPL-SCNC: 107 MMOL/L
CK SERPL-CCNC: 142 U/L
CK SERPL-CCNC: 162 U/L
CO2 SERPL-SCNC: 24 MMOL/L
CO2 SERPL-SCNC: 25 MMOL/L
COLOR: NORMAL
CREAT SERPL-MCNC: 0.86 MG/DL
CREAT SERPL-MCNC: 0.87 MG/DL
CREAT SERPL-MCNC: 0.88 MG/DL
CREAT SERPL-MCNC: 1.41 MG/DL
CREAT SPEC-SCNC: 72 MG/DL
CREAT SPEC-SCNC: 73 MG/DL
CREAT/PROT UR: 0.1 RATIO
CRP SERPL-MCNC: 3 MG/L
CRP SERPL-MCNC: <3 MG/L
DEPRECATED KAPPA LC FREE/LAMBDA SER: 0.9 RATIO
DSDNA AB SER-ACNC: 14 IU/ML
EOSINOPHIL # BLD AUTO: 0.22 K/UL
EOSINOPHIL # BLD AUTO: 0.23 K/UL
EOSINOPHIL # BLD AUTO: 0.26 K/UL
EOSINOPHIL # BLD AUTO: 0.32 K/UL
EOSINOPHIL NFR BLD AUTO: 2.8 %
EOSINOPHIL NFR BLD AUTO: 2.9 %
EOSINOPHIL NFR BLD AUTO: 3.5 %
EOSINOPHIL NFR BLD AUTO: 5.1 %
ERYTHROCYTE [SEDIMENTATION RATE] IN BLOOD BY WESTERGREN METHOD: 16 MM/HR
ERYTHROCYTE [SEDIMENTATION RATE] IN BLOOD BY WESTERGREN METHOD: 18 MM/HR
ERYTHROCYTE [SEDIMENTATION RATE] IN BLOOD BY WESTERGREN METHOD: 20 MM/HR
ERYTHROCYTE [SEDIMENTATION RATE] IN BLOOD BY WESTERGREN METHOD: 21 MM/HR
GAMMA GLOB FLD ELPH-MCNC: 1.2 G/DL
GAMMA GLOB MFR SERPL ELPH: 17.1 %
GLUCOSE QUALITATIVE U: NEGATIVE
GLUCOSE SERPL-MCNC: 81 MG/DL
GLUCOSE SERPL-MCNC: 82 MG/DL
GLUCOSE SERPL-MCNC: 89 MG/DL
GLUCOSE SERPL-MCNC: 99 MG/DL
HAV IGM SER QL: NONREACTIVE
HBV CORE IGG+IGM SER QL: NONREACTIVE
HBV CORE IGM SER QL: NONREACTIVE
HBV SURFACE AG SER QL: NONREACTIVE
HCT VFR BLD CALC: 44.8 %
HCT VFR BLD CALC: 45.5 %
HCT VFR BLD CALC: 47.2 %
HCT VFR BLD CALC: 48.2 %
HCV AB SER QL: NONREACTIVE
HCV S/CO RATIO: 0.2 S/CO
HGB BLD-MCNC: 14.3 G/DL
HGB BLD-MCNC: 14.6 G/DL
HGB BLD-MCNC: 14.6 G/DL
HGB BLD-MCNC: 14.7 G/DL
HYALINE CASTS: 2 /LPF
IGA SER QL IEP: 213 MG/DL
IGG SER QL IEP: 1251 MG/DL
IGM SER QL IEP: 134 MG/DL
IMM GRANULOCYTES NFR BLD AUTO: 0.2 %
IMM GRANULOCYTES NFR BLD AUTO: 0.3 %
IMM GRANULOCYTES NFR BLD AUTO: 0.5 %
IMM GRANULOCYTES NFR BLD AUTO: 0.7 %
INTERPRETATION SERPL IEP-IMP: NORMAL
KAPPA LC CSF-MCNC: 2.18 MG/DL
KAPPA LC SERPL-MCNC: 1.97 MG/DL
KETONES URINE: NEGATIVE
LDH SERPL-CCNC: 276 U/L
LEUKOCYTE ESTERASE URINE: NEGATIVE
LYMPHOCYTES # BLD AUTO: 2.17 K/UL
LYMPHOCYTES # BLD AUTO: 2.35 K/UL
LYMPHOCYTES # BLD AUTO: 2.39 K/UL
LYMPHOCYTES # BLD AUTO: 3.02 K/UL
LYMPHOCYTES NFR BLD AUTO: 26.6 %
LYMPHOCYTES NFR BLD AUTO: 34.3 %
LYMPHOCYTES NFR BLD AUTO: 37 %
LYMPHOCYTES NFR BLD AUTO: 37.5 %
M PROTEIN MFR SERPL ELPH: NORMAL
M PROTEIN SPEC IFE-MCNC: NORMAL
M TB IFN-G BLD-IMP: NEGATIVE
MAN DIFF?: NORMAL
MCHC RBC-ENTMCNC: 29.8 PG
MCHC RBC-ENTMCNC: 29.9 PG
MCHC RBC-ENTMCNC: 30.5 GM/DL
MCHC RBC-ENTMCNC: 30.7 PG
MCHC RBC-ENTMCNC: 30.9 GM/DL
MCHC RBC-ENTMCNC: 31.7 PG
MCHC RBC-ENTMCNC: 31.9 GM/DL
MCHC RBC-ENTMCNC: 32.1 GM/DL
MCV RBC AUTO: 95.8 FL
MCV RBC AUTO: 96.3 FL
MCV RBC AUTO: 98 FL
MCV RBC AUTO: 99.3 FL
MICROSCOPIC-UA: NORMAL
MONOCLON BAND OBS SERPL: NORMAL
MONOCYTES # BLD AUTO: 0.4 K/UL
MONOCYTES # BLD AUTO: 0.45 K/UL
MONOCYTES # BLD AUTO: 0.58 K/UL
MONOCYTES # BLD AUTO: 0.67 K/UL
MONOCYTES NFR BLD AUTO: 6.3 %
MONOCYTES NFR BLD AUTO: 6.4 %
MONOCYTES NFR BLD AUTO: 7.2 %
MONOCYTES NFR BLD AUTO: 8.2 %
NEUTROPHILS # BLD AUTO: 3.17 K/UL
NEUTROPHILS # BLD AUTO: 3.36 K/UL
NEUTROPHILS # BLD AUTO: 4.13 K/UL
NEUTROPHILS # BLD AUTO: 5.7 K/UL
NEUTROPHILS NFR BLD AUTO: 50.5 %
NEUTROPHILS NFR BLD AUTO: 50.6 %
NEUTROPHILS NFR BLD AUTO: 53.2 %
NEUTROPHILS NFR BLD AUTO: 63.3 %
NITRITE URINE: NEGATIVE
PH URINE: 6
PLATELET # BLD AUTO: 252 K/UL
PLATELET # BLD AUTO: 261 K/UL
PLATELET # BLD AUTO: 282 K/UL
PLATELET # BLD AUTO: 293 K/UL
POTASSIUM SERPL-SCNC: 4.3 MMOL/L
POTASSIUM SERPL-SCNC: 4.4 MMOL/L
POTASSIUM SERPL-SCNC: 4.9 MMOL/L
POTASSIUM SERPL-SCNC: 5 MMOL/L
PROT SERPL-MCNC: 6.9 G/DL
PROT SERPL-MCNC: 7.1 G/DL
PROT SERPL-MCNC: 7.2 G/DL
PROT SERPL-MCNC: 7.3 G/DL
PROT UR-MCNC: 4 MG/DL
PROTEIN URINE: NEGATIVE
QUANTIFERON TB PLUS MITOGEN MINUS NIL: 7.03 IU/ML
QUANTIFERON TB PLUS NIL: 0.03 IU/ML
QUANTIFERON TB PLUS TB1 MINUS NIL: -0.01 IU/ML
QUANTIFERON TB PLUS TB2 MINUS NIL: -0.01 IU/ML
RBC # BLD: 4.51 M/UL
RBC # BLD: 4.75 M/UL
RBC # BLD: 4.9 M/UL
RBC # BLD: 4.92 M/UL
RBC # FLD: 12.8 %
RBC # FLD: 12.8 %
RBC # FLD: 13.3 %
RBC # FLD: 13.4 %
RED BLOOD CELLS URINE: 1 /HPF
RHEUMATOID FACT SER QL: 24 IU/ML
SODIUM SERPL-SCNC: 139 MMOL/L
SODIUM SERPL-SCNC: 141 MMOL/L
SODIUM SERPL-SCNC: 141 MMOL/L
SODIUM SERPL-SCNC: 143 MMOL/L
SPECIFIC GRAVITY URINE: 1.02
SQUAMOUS EPITHELIAL CELLS: 2 /HPF
UROBILINOGEN URINE: NORMAL
WBC # FLD AUTO: 6.27 K/UL
WBC # FLD AUTO: 6.32 K/UL
WBC # FLD AUTO: 8.17 K/UL
WBC # FLD AUTO: 9 K/UL
WHITE BLOOD CELLS URINE: 1 /HPF

## 2022-04-01 ENCOUNTER — APPOINTMENT (OUTPATIENT)
Dept: PULMONOLOGY | Facility: CLINIC | Age: 57
End: 2022-04-01
Payer: MEDICARE

## 2022-04-01 VITALS
WEIGHT: 145 LBS | BODY MASS INDEX: 27.4 KG/M2 | SYSTOLIC BLOOD PRESSURE: 122 MMHG | RESPIRATION RATE: 14 BRPM | DIASTOLIC BLOOD PRESSURE: 76 MMHG | HEART RATE: 79 BPM | OXYGEN SATURATION: 96 %

## 2022-04-01 PROBLEM — U07.1 COVID-19: Chronic | Status: ACTIVE | Noted: 2022-02-17

## 2022-04-01 PROBLEM — N81.4 UTEROVAGINAL PROLAPSE, UNSPECIFIED: Chronic | Status: ACTIVE | Noted: 2022-02-17

## 2022-04-01 PROBLEM — N81.6 RECTOCELE: Chronic | Status: ACTIVE | Noted: 2022-02-17

## 2022-04-01 PROBLEM — M35.00 SJOGREN SYNDROME, UNSPECIFIED: Chronic | Status: ACTIVE | Noted: 2022-02-17

## 2022-04-01 PROBLEM — M48.02 SPINAL STENOSIS, CERVICAL REGION: Chronic | Status: ACTIVE | Noted: 2022-02-17

## 2022-04-01 PROBLEM — R76.8 OTHER SPECIFIED ABNORMAL IMMUNOLOGICAL FINDINGS IN SERUM: Chronic | Status: ACTIVE | Noted: 2022-02-17

## 2022-04-01 PROBLEM — J84.9 INTERSTITIAL PULMONARY DISEASE, UNSPECIFIED: Chronic | Status: ACTIVE | Noted: 2022-02-17

## 2022-04-01 PROCEDURE — 99214 OFFICE O/P EST MOD 30 MIN: CPT

## 2022-04-01 RX ORDER — PREDNISONE 2.5 MG/1
2.5 TABLET ORAL AS DIRECTED
Qty: 15 | Refills: 2 | Status: DISCONTINUED | COMMUNITY
Start: 2022-01-25 | End: 2022-04-01

## 2022-04-01 RX ORDER — MONTELUKAST 10 MG/1
10 TABLET, FILM COATED ORAL
Refills: 0 | Status: DISCONTINUED | COMMUNITY
Start: 2020-11-18 | End: 2022-04-01

## 2022-04-01 NOTE — PROCEDURE
[FreeTextEntry1] : [facility] CT [facility]\par \par \par PROCEDURE DATE: 12/14/2021\par \par \par \par INTERPRETATION: INDICATION: Follow-up groundglass\par \par TECHNIQUE: Volumetric images of the chest without intravenous contrast. Maximum intensity projection images were generated.\par \par COMPARISON: CT chest 8/23/2021.\par \par FINDINGS:\par \par LUNGS/AIRWAYS/PLEURA: Patent airways to the segmental bronchi. Allowing for technical differences, no definite change in mosaic perfusion and superimposed groundglass, the latter mostly in the anterior upper lobes. Stable mild bronchial dilatation and mucoid impaction in the lingula. New tree-in-bud opacities in the left upper lobe and left lower lobe. Unremarkable pleura.\par \par LYMPH NODES/MEDIASTINUM: Unremarkable thyroid. No enlarged lymph nodes.\par \par HEART/VASCULATURE: Normal heart size. Unremarkable pericardium. Normal caliber aorta and main pulmonary artery.\par \par UPPER ABDOMEN:Mild partially included fat stranding in the left upper quadrant. Punctate right renal calculus.\par \par BONES/SOFT TISSUES: Degenerative changes of the spine.\par \par \par IMPRESSION:\par \par Since 8/3/2021:\par \par New tree-in-bud in the left upper lobe and left lower lobe, consistent with impacted bronchioles, consider infectious bronchiolitis in the appropriate clinical setting.\par \par Stable airways disease with mosaic perfusion and mucous plugging within the lingula.\par \par Stable superimposed groundglass, predominantly affecting the upper lobes, which in the setting of possible lupus, may reflect pulmonary hemorrhage.\par \par Partially included mild nonspecific fat stranding in the left upper quadrant.\par \par \par \par --- End of Report ---\par \par \par \par \par \par \par KALPESH BHATIA M.D., ATTENDING RADIOGIST\par This document has been electronically signed. Dec 16 2021 5:11PM \par \par Electronically signed by : DELIFNO WILKINS MD; Dec 20 2021 3:24PM EST (Author)\par \par ~~~~~~~~~~~~~~~~~~~~~~~~~~~~~~~~~~~~~~~~~~~~~~~~~~~~~~~~~~~~~~~~~~~~~~~~\par \par [facility] CT [facility]\par \par \par PROCEDURE DATE: 08/23/2021\par \par \par \par INTERPRETATION: .\par ACC: [id number]\par INDICATION: Dyspnea, interstitial lung disease, SLE. On immunosuppressive>needs f/u of bilateral infiltrates.\par TECHNIQUE: Unenhanced CT of the chest. Coronal and sagittal images were reconstructed. Maximum intensity projection images were generated.\par COMPARISON: CT chest 3/27/2021\par \par FINDINGS:\par \par AIRWAYS, LUNGS and PLEURA: Patent central airways. Diffuse groundglass opacities and septal thickening are increased from 3/27/2021. Mild mucoid impaction and bronchiectasis within the lingula is unchanged. No pleural effusion.\par \par MEDIASTINUM AND HERNESTO: No lymphadenopathy.\par \par HEART AND VESSELS: Heart size is normal. No pericardial effusion. Thoracic aorta and pulmonary artery normal in diameter.\par \par VISUALIZED UPPER ABDOMEN: Small hiatal hernia.\par \par CHEST WALL AND BONES: No aggressive osseous lesion. Bilateral breast implants.\par \par LOWER NECK: Within normal limits.\par \par IMPRESSION:\par \par Diffuse groundglass opacities and septal thickening are increased from 3/27/2021.\par \par --- End of Report ---\par \par \par \par \par \par \par SAUL GUERRA MD; Attending Radiologist\par This document has been electronically signed. Aug 25 2021 10:23AM\par \par ~~~~~~~~~~~~~~~~~~~~~~~~~~~~~~~~~~~~~~~~~~~~~~~~~~~~~~~~~~~~~~~~~~~~~~~~\par \par [facility]\par EXAM: CT CHEST\par \par \par PROCEDURE DATE: 03/27/2021\par \par \par \par INTERPRETATION: EXAMINATION: CT CHEST\par \par CLINICAL INDICATION: Lung opacity.\par \par TECHNIQUE: Noncontrast CT of the chest was obtained.\par \par COMPARISON: 1/21/2021.\par \par FINDINGS:\par \par AIRWAYS AND LUNGS: The central tracheobronchial tree is patent. Bilateral ill-defined groundglass opacities and tiny nodules are decreased but not resolved compared to the prior study.\par \par MEDIASTINUM AND PLEURA: There are no enlarged mediastinal, hilar or axillary lymph nodes. The visualized portion of the thyroid gland is unremarkable. There is no pleural effusion. There is no pneumothorax.\par \par HEART AND VESSELS: The heart is normal in size. There are no atherosclerotic calcifications of the aorta. There is no pericardial effusion.\par \par UPPER ABDOMEN: Images of the upper abdomen demonstrate no abnormality.\par \par BONES AND SOFT TISSUES: The bones are unremarkable. The soft tissues are unremarkable.\par \par TUBES/LINES: None.\par \par IMPRESSION:\par Bilateral ill-defined groundglass opacities and tiny nodules are decreased but not resolved compared to the prior study. Continued follow-up CT recommended for complete evaluation.\par \par \par \par \par \par \par TARA ALMENDAREZ MD; Attending Radiologist\par This document has been electronically signed. Mar 30 2021 8:26PM\par \par ~~~~~~~~~~~~~~~~~~~~~~~~~~~~~~~~~~~~~~~~~~~~~~~~~~~~~~~~~~~~~~~~~~~~~~~~\par \par [facility]\par \par EXAM: CT CHEST\par \par \par PROCEDURE DATE: 01/21/2021\par \par \par \par INTERPRETATION: EXAMINATION: CT CHEST\par \par CLINICAL INDICATION: Abnormal chest x-ray.\par \par TECHNIQUE: Noncontrast CT of the chest was obtained.\par \par COMPARISON: None.\par \par FINDINGS:\par \par AIRWAYS AND LUNGS: The central tracheobronchial tree is patent. Bilateral ill-defined groundglass opacity and tiny nodules with subpleural sparing. Mosaic attenuation. No bronchiectasis or honeycombing.\par \par MEDIASTINUM AND PLEURA: There are no enlarged mediastinal, hilar or axillary lymph nodes. The visualized portion of the thyroid gland is unremarkable. There is no pleural effusion. There is no pneumothorax.\par \par HEART AND VESSELS: The heart is normal in size. There are no atherosclerotic calcifications of the aorta. There is no pericardial effusion.\par \par UPPER ABDOMEN: Images of the upper abdomen demonstrate slightly lobulated left kidney..\par \par BONES AND SOFT TISSUES: The bones are unremarkable. Bilateral breast implants.\par \par TUBES/LINES: None.\par \par IMPRESSION:\par Lung findings are new from cervical spine CT 1/15/2009 and are indeterminate. Interstitial lung disease should be considered.\par \par Slightly lobulated left kidney, ultrasound recommended for complete evaluation.\par \par \par \par \par \par \par TARA ALMENDAREZ MD; Attending Radiologist\par This document has been electronically signed. Jan 22 2021 8:25AM \par

## 2022-04-01 NOTE — HISTORY OF PRESENT ILLNESS
[Former] : former [TextBox_4] : 57F PMH asymptomatic COVID (09/2021), Sjogren's, ILD on MMF and 7.5mg Q48H Prednisone who presents for follow up. Currently on Prednisone 7.5mg every 2 days as well as Breo ellipta. She feels better with Breo ellipta. No recent hospitalizations. Recently had ureteral sling surgery that went well. Denying fevers, chills. No N/V/D. No chest pain or palpitations.  [TextBox_11] : 0.1 [TextBox_13] : 4 [YearQuit] : 1991

## 2022-04-12 ENCOUNTER — APPOINTMENT (OUTPATIENT)
Dept: RHEUMATOLOGY | Facility: CLINIC | Age: 57
End: 2022-04-12
Payer: MEDICARE

## 2022-04-12 DIAGNOSIS — M48.07 SPINAL STENOSIS, LUMBOSACRAL REGION: ICD-10-CM

## 2022-04-12 PROCEDURE — 99214 OFFICE O/P EST MOD 30 MIN: CPT | Mod: 25

## 2022-04-13 PROBLEM — M48.07 LUMBOSACRAL STENOSIS: Status: ACTIVE | Noted: 2017-01-18

## 2022-04-13 LAB
ALBUMIN SERPL ELPH-MCNC: 4.4 G/DL
ALP BLD-CCNC: 116 U/L
ALT SERPL-CCNC: 25 U/L
ANION GAP SERPL CALC-SCNC: 13 MMOL/L
AST SERPL-CCNC: 21 U/L
BASOPHILS # BLD AUTO: 0.06 K/UL
BASOPHILS NFR BLD AUTO: 0.8 %
BILIRUB SERPL-MCNC: 0.2 MG/DL
BUN SERPL-MCNC: 12 MG/DL
CALCIUM SERPL-MCNC: 10 MG/DL
CHLORIDE SERPL-SCNC: 106 MMOL/L
CO2 SERPL-SCNC: 24 MMOL/L
CREAT SERPL-MCNC: 0.81 MG/DL
CRP SERPL-MCNC: <3 MG/L
EGFR: 84 ML/MIN/1.73M2
EOSINOPHIL # BLD AUTO: 0.16 K/UL
EOSINOPHIL NFR BLD AUTO: 2 %
ERYTHROCYTE [SEDIMENTATION RATE] IN BLOOD BY WESTERGREN METHOD: 21 MM/HR
GLUCOSE SERPL-MCNC: 96 MG/DL
HCT VFR BLD CALC: 44 %
HGB BLD-MCNC: 13.9 G/DL
IMM GRANULOCYTES NFR BLD AUTO: 0.6 %
LDH SERPL-CCNC: 252 U/L
LYMPHOCYTES # BLD AUTO: 2.08 K/UL
LYMPHOCYTES NFR BLD AUTO: 26.2 %
MAN DIFF?: NORMAL
MCHC RBC-ENTMCNC: 30 PG
MCHC RBC-ENTMCNC: 31.6 GM/DL
MCV RBC AUTO: 95 FL
MONOCYTES # BLD AUTO: 0.45 K/UL
MONOCYTES NFR BLD AUTO: 5.7 %
NEUTROPHILS # BLD AUTO: 5.15 K/UL
NEUTROPHILS NFR BLD AUTO: 64.7 %
PLATELET # BLD AUTO: 269 K/UL
POTASSIUM SERPL-SCNC: 4.5 MMOL/L
PROT SERPL-MCNC: 7.2 G/DL
RBC # BLD: 4.63 M/UL
RBC # FLD: 13.7 %
RHEUMATOID FACT SER QL: 18 IU/ML
SODIUM SERPL-SCNC: 142 MMOL/L
WBC # FLD AUTO: 7.95 K/UL

## 2022-04-15 LAB
ALBUMIN MFR SERPL ELPH: 59.1 %
ALBUMIN SERPL-MCNC: 4.3 G/DL
ALBUMIN/GLOB SERPL: 1.5 RATIO
ALPHA1 GLOB MFR SERPL ELPH: 3.6 %
ALPHA1 GLOB SERPL ELPH-MCNC: 0.3 G/DL
ALPHA2 GLOB MFR SERPL ELPH: 10.2 %
ALPHA2 GLOB SERPL ELPH-MCNC: 0.7 G/DL
B-GLOBULIN MFR SERPL ELPH: 11.4 %
B-GLOBULIN SERPL ELPH-MCNC: 0.8 G/DL
DEPRECATED KAPPA LC FREE/LAMBDA SER: 1.07 RATIO
GAMMA GLOB FLD ELPH-MCNC: 1.1 G/DL
GAMMA GLOB MFR SERPL ELPH: 15.7 %
IGA SER QL IEP: 199 MG/DL
IGG SER QL IEP: 1167 MG/DL
IGM SER QL IEP: 112 MG/DL
INTERPRETATION SERPL IEP-IMP: NORMAL
KAPPA LC CSF-MCNC: 1.8 MG/DL
KAPPA LC SERPL-MCNC: 1.92 MG/DL
M PROTEIN MFR SERPL ELPH: NORMAL
M PROTEIN SPEC IFE-MCNC: NORMAL
MONOCLON BAND OBS SERPL: NORMAL
PROT SERPL-MCNC: 7.2 G/DL
PROT SERPL-MCNC: 7.2 G/DL

## 2022-04-26 ENCOUNTER — APPOINTMENT (OUTPATIENT)
Dept: UROGYNECOLOGY | Facility: CLINIC | Age: 57
End: 2022-04-26
Payer: MEDICARE

## 2022-04-26 ENCOUNTER — RESULT CHARGE (OUTPATIENT)
Age: 57
End: 2022-04-26

## 2022-04-26 VITALS
DIASTOLIC BLOOD PRESSURE: 84 MMHG | SYSTOLIC BLOOD PRESSURE: 137 MMHG | TEMPERATURE: 98.1 F | HEART RATE: 81 BPM | RESPIRATION RATE: 99 BRPM

## 2022-04-26 LAB
BILIRUB UR QL STRIP: NORMAL
CLARITY UR: CLEAR
COLLECTION METHOD: NORMAL
GLUCOSE UR-MCNC: NORMAL
HCG UR QL: 0.2 EU/DL
HGB UR QL STRIP.AUTO: NORMAL
KETONES UR-MCNC: NORMAL
LEUKOCYTE ESTERASE UR QL STRIP: NORMAL
NITRITE UR QL STRIP: NORMAL
PH UR STRIP: 7
PROT UR STRIP-MCNC: NORMAL
SP GR UR STRIP: 1.01

## 2022-04-26 PROCEDURE — 99024 POSTOP FOLLOW-UP VISIT: CPT

## 2022-05-02 NOTE — OBJECTIVE
[Post Void Residual ____ ml] : Post Void Residual was [unfilled] ml [Good Support] : Good support [FreeTextEntry3] : no erosion, no active bleeding non tender , sutures dissolving well

## 2022-05-02 NOTE — SUBJECTIVE
[FreeTextEntry1] : doing well trying to regulate BM  [FreeTextEntry8] : no changes  [FreeTextEntry7] : denied  [FreeTextEntry6] : good  [FreeTextEntry5] : no leakage of urine with laugh cough or sneeze , empties well , no dysuria , no bleeding ,noctoria x 0 [FreeTextEntry4] : last bm one day ago medium , taking miralax  [FreeTextEntry3] : ok [FreeTextEntry2] : ok

## 2022-05-02 NOTE — DISCUSSION/SUMMARY
[Post-Op instructions given. Pt/family verbalizes understanding] : post-operative instructions were provided to the patient/family who verbalize understanding [FreeTextEntry1] : 4 months for 6 month exam \par slowly increase activity \par

## 2022-05-02 NOTE — ASSESSMENT
[FreeTextEntry1] : 58 y/o female 7 weeks post robotic surgery pt doing well , happy with outcome. pt to start mag o7 to regulate bm better.  \par pt traveling to [place] in one week . we discussed the importance of ambulating and compression stockings. pt understands and agree

## 2022-05-12 ENCOUNTER — NON-APPOINTMENT (OUTPATIENT)
Age: 57
End: 2022-05-12

## 2022-05-12 ENCOUNTER — APPOINTMENT (OUTPATIENT)
Dept: PULMONOLOGY | Facility: CLINIC | Age: 57
End: 2022-05-12
Payer: MEDICARE

## 2022-05-12 VITALS
RESPIRATION RATE: 16 BRPM | HEIGHT: 61 IN | HEART RATE: 79 BPM | BODY MASS INDEX: 28.32 KG/M2 | SYSTOLIC BLOOD PRESSURE: 124 MMHG | OXYGEN SATURATION: 96 % | WEIGHT: 150 LBS | DIASTOLIC BLOOD PRESSURE: 76 MMHG

## 2022-05-12 PROCEDURE — 99214 OFFICE O/P EST MOD 30 MIN: CPT

## 2022-05-12 RX ORDER — FAMOTIDINE 40 MG/1
40 TABLET, FILM COATED ORAL
Qty: 90 | Refills: 3 | Status: DISCONTINUED | COMMUNITY
Start: 2022-02-11 | End: 2022-05-12

## 2022-05-12 RX ORDER — AMLODIPINE BESYLATE 10 MG/1
10 TABLET ORAL
Qty: 30 | Refills: 0 | Status: DISCONTINUED | COMMUNITY
Start: 2021-04-20 | End: 2022-05-12

## 2022-05-12 RX ORDER — FAMOTIDINE 40 MG/1
40 TABLET, FILM COATED ORAL
Qty: 180 | Refills: 1 | Status: DISCONTINUED | COMMUNITY
Start: 2021-01-18 | End: 2022-05-12

## 2022-05-12 RX ORDER — HYDROCHLOROTHIAZIDE 25 MG/1
25 TABLET ORAL
Qty: 30 | Refills: 0 | Status: DISCONTINUED | COMMUNITY
Start: 2021-04-20 | End: 2022-05-12

## 2022-05-12 RX ORDER — VALSARTAN 320 MG/1
320 TABLET, COATED ORAL
Qty: 30 | Refills: 0 | Status: DISCONTINUED | COMMUNITY
Start: 2021-04-20 | End: 2022-05-12

## 2022-05-12 NOTE — HISTORY OF PRESENT ILLNESS
[Former] : former [TextBox_4] : 57F PMH asymptomatic COVID (09/2021), Sjogren's, ILD on MMF and 5mg Q48H Prednisone who presents for follow up. Currently is on Breo ellipta. Pt has areas of GGO on CT 12/2021. Several weeks ago she had the flu and was given medications by her PCP, she reports also taking antibiotics. No current fevers or chills. No N/V/D. No chest pain or palpitations. No productive cough.  [TextBox_11] : 0.1 [TextBox_13] : 4 [YearQuit] : 1991

## 2022-05-12 NOTE — PROCEDURE
[FreeTextEntry1] : [facility]\par [facility]AM: CT CHEST\par \par \par PROCEDURE DATE: 12/14/2021\par \par \par \par INTERPRETATION: INDICATION: Follow-up groundglass\par \par TECHNIQUE: Volumetric images of the chest without intravenous contrast. Maximum intensity projection images were generated.\par \par COMPARISON: CT chest 8/23/2021.\par \par FINDINGS:\par \par LUNGS/AIRWAYS/PLEURA: Patent airways to the segmental bronchi. Allowing for technical differences, no definite change in mosaic perfusion and superimposed groundglass, the latter mostly in the anterior upper lobes. Stable mild bronchial dilatation and mucoid impaction in the lingula. New tree-in-bud opacities in the left upper lobe and left lower lobe. Unremarkable pleura.\par \par LYMPH NODES/MEDIASTINUM: Unremarkable thyroid. No enlarged lymph nodes.\par \par HEART/VASCULATURE: Normal heart size. Unremarkable pericardium. Normal caliber aorta and main pulmonary artery.\par \par UPPER ABDOMEN:Mild partially included fat stranding in the left upper quadrant. Punctate right renal calculus.\par \par BONES/SOFT TISSUES: Degenerative changes of the spine.\par \par \par IMPRESSION:\par \par Since 8/3/2021:\par \par New tree-in-bud in the left upper lobe and left lower lobe, consistent with impacted bronchioles, consider infectious bronchiolitis in the appropriate clinical setting.\par \par Stable airways disease with mosaic perfusion and mucous plugging within the lingula.\par \par Stable superimposed groundglass, predominantly affecting the upper lobes, which in the setting of possible lupus, may reflect pulmonary hemorrhage.\par \par Partially included mild nonspecific fat stranding in the left upper quadrant.\par \par \par \par --- End of Report ---\par \par \par \par \par \par \par KALPESH BHATIA M.D., ATTENDING RADIOGIST\par This document has been electronically signed. Dec 16 2021 5:11PM \par \par Electronically signed by : DELFINO WILKINS MD; Dec 20 2021 3:24PM EST (Author)\par \par ~~~~~~~~~~~~~~~~~~~~~~~~~~~~~~~~~~~~~~~~~~~~~~~~~~~~~~~~~~~~~~~~~~~~~~~~\par \par [facility]: CT CHEST\par \par \par PROCEDURE DATE: 08/23/2021\par \par \par \par INTERPRETATION: .\par ACC: [id number]\par INDICATION: Dyspnea, interstitial lung disease, SLE. On immunosuppressive>needs f/u of bilateral infiltrates.\par TECHNIQUE: Unenhanced CT of the chest. Coronal and sagittal images were reconstructed. Maximum intensity projection images were generated.\par COMPARISON: CT chest 3/27/2021\par \par FINDINGS:\par \par AIRWAYS, LUNGS and PLEURA: Patent central airways. Diffuse groundglass opacities and septal thickening are increased from 3/27/2021. Mild mucoid impaction and bronchiectasis within the lingula is unchanged. No pleural effusion.\par \par MEDIASTINUM AND HERNESTO: No lymphadenopathy.\par \par HEART AND VESSELS: Heart size is normal. No pericardial effusion. Thoracic aorta and pulmonary artery normal in diameter.\par \par VISUALIZED UPPER ABDOMEN: Small hiatal hernia.\par \par CHEST WALL AND BONES: No aggressive osseous lesion. Bilateral breast implants.\par \par LOWER NECK: Within normal limits.\par \par IMPRESSION:\par \par Diffuse groundglass opacities and septal thickening are increased from 3/27/2021.\par \par --- End of Report ---\par \par \par \par \par \par \par SAUL GUERRA MD; Attending Radiologist\par This document has been electronically signed. Aug 25 2021 10:23AM\par \par ~~~~~~~~~~~~~~~~~~~~~~~~~~~~~~~~~~~~~~~~~~~~~~~~~~~~~~~~~~~~~~~~~~~~~~~~\par \par [facility]\par EXAM: CT CHEST\par \par \par PROCEDURE DATE: 03/27/2021\par \par \par \par INTERPRETATION: EXAMINATION: CT CHEST\par \par CLINICAL INDICATION: Lung opacity.\par \par TECHNIQUE: Noncontrast CT of the chest was obtained.\par \par COMPARISON: 1/21/2021.\par \par FINDINGS:\par \par AIRWAYS AND LUNGS: The central tracheobronchial tree is patent. Bilateral ill-defined groundglass opacities and tiny nodules are decreased but not resolved compared to the prior study.\par \par MEDIASTINUM AND PLEURA: There are no enlarged mediastinal, hilar or axillary lymph nodes. The visualized portion of the thyroid gland is unremarkable. There is no pleural effusion. There is no pneumothorax.\par \par HEART AND VESSELS: The heart is normal in size. There are no atherosclerotic calcifications of the aorta. There is no pericardial effusion.\par \par UPPER ABDOMEN: Images of the upper abdomen demonstrate no abnormality.\par \par BONES AND SOFT TISSUES: The bones are unremarkable. The soft tissues are unremarkable.\par \par TUBES/LINES: None.\par \par IMPRESSION:\par Bilateral ill-defined groundglass opacities and tiny nodules are decreased but not resolved compared to the prior study. Continued follow-up CT recommended for complete evaluation.\par \par \par \par \par \par \par TARA ALMENDAREZ MD; Attending Radiologist\par This document has been electronically signed. Mar 30 2021 8:26PM\par \par ~~~~~~~~~~~~~~~~~~~~~~~~~~~~~~~~~~~~~~~~~~~~~~~~~~~~~~~~~~~~~~~~~~~~~~~~\par \par [facility]\par \par EXAM: CT CHEST\par \par \par PROCEDURE DATE: 01/21/2021\par \par \par \par INTERPRETATION: EXAMINATION: CT CHEST\par \par CLINICAL INDICATION: Abnormal chest x-ray.\par \par TECHNIQUE: Noncontrast CT of the chest was obtained.\par \par COMPARISON: None.\par \par FINDINGS:\par \par AIRWAYS AND LUNGS: The central tracheobronchial tree is patent. Bilateral ill-defined groundglass opacity and tiny nodules with subpleural sparing. Mosaic attenuation. No bronchiectasis or honeycombing.\par \par MEDIASTINUM AND PLEURA: There are no enlarged mediastinal, hilar or axillary lymph nodes. The visualized portion of the thyroid gland is unremarkable. There is no pleural effusion. There is no pneumothorax.\par \par HEART AND VESSELS: The heart is normal in size. There are no atherosclerotic calcifications of the aorta. There is no pericardial effusion.\par \par UPPER ABDOMEN: Images of the upper abdomen demonstrate slightly lobulated left kidney..\par \par BONES AND SOFT TISSUES: The bones are unremarkable. Bilateral breast implants.\par \par TUBES/LINES: None.\par \par IMPRESSION:\par Lung findings are new from cervical spine CT 1/15/2009 and are indeterminate. Interstitial lung disease should be considered.\par \par Slightly lobulated left kidney, ultrasound recommended for complete evaluation.\par \par \par \par \par \par \par TARA ALMENDAREZ MD; Attending Radiologist\par This document has been electronically signed. Jan 22 2021 8:25AM \par

## 2022-08-12 ENCOUNTER — APPOINTMENT (OUTPATIENT)
Dept: PULMONOLOGY | Facility: CLINIC | Age: 57
End: 2022-08-12

## 2022-08-16 ENCOUNTER — RESULT CHARGE (OUTPATIENT)
Age: 57
End: 2022-08-16

## 2022-08-16 ENCOUNTER — APPOINTMENT (OUTPATIENT)
Age: 57
End: 2022-08-16

## 2022-08-16 VITALS
TEMPERATURE: 98.2 F | SYSTOLIC BLOOD PRESSURE: 117 MMHG | WEIGHT: 150 LBS | DIASTOLIC BLOOD PRESSURE: 75 MMHG | BODY MASS INDEX: 28.32 KG/M2 | OXYGEN SATURATION: 96 % | HEIGHT: 61 IN | HEART RATE: 88 BPM

## 2022-08-16 DIAGNOSIS — N90.5 ATROPHY OF VULVA: ICD-10-CM

## 2022-08-16 LAB
BILIRUB UR QL STRIP: NEGATIVE
CLARITY UR: CLEAR
COLLECTION METHOD: NORMAL
GLUCOSE UR-MCNC: NEGATIVE
HCG UR QL: 0.2 EU/DL
HGB UR QL STRIP.AUTO: NEGATIVE
KETONES UR-MCNC: NEGATIVE
LEUKOCYTE ESTERASE UR QL STRIP: NORMAL
NITRITE UR QL STRIP: NEGATIVE
PH UR STRIP: 5.5
PROT UR STRIP-MCNC: NEGATIVE
SP GR UR STRIP: 1.02

## 2022-08-16 PROCEDURE — 99213 OFFICE O/P EST LOW 20 MIN: CPT

## 2022-08-16 PROCEDURE — 81003 URINALYSIS AUTO W/O SCOPE: CPT | Mod: QW

## 2022-08-16 PROCEDURE — 51798 US URINE CAPACITY MEASURE: CPT

## 2022-08-16 RX ORDER — ESTRADIOL 0.1 MG/G
0.1 CREAM VAGINAL
Qty: 1 | Refills: 0 | Status: ACTIVE | COMMUNITY
Start: 2022-08-16 | End: 1900-01-01

## 2022-08-16 NOTE — DISCUSSION/SUMMARY
[FreeTextEntry1] : \ishaan Cait presents for f/u s/p reconstructive surgery, doing well, risks/benefits of vaginal estrogen discussed will start, f/u with GYN, all questions answered

## 2022-08-16 NOTE — PHYSICAL EXAM
[Chaperone Present] : A chaperone was present in the examining room during all aspects of the physical examination [No Acute Distress] : in no acute distress [Well developed] : well developed [Well Nourished] : ~L well nourished [Oriented x3] : oriented to person, place, and time [Normal Memory] : ~T memory was ~L unimpaired [Normal Mood/Affect] : mood and affect are normal [Cough] : no cough [No Edema] : ~T edema was not present [Tenderness] : ~T no ~M abdominal tenderness observed [Distended] : not distended [None] : no CVA tenderness [Inguinal LAD] : no adenopathy was noted in the inguinal lymph nodes [Warm and Dry] : was warm and dry to touch [Normal Gait] : gait was normal [Labia Majora] : were normal [Labia Minora] : were normal [Atrophy] : atrophy [No Bleeding] : there was no active vaginal bleeding [Normal] : no abnormalities [de-identified] : no POP, excellent support, no mesh exposure, graft nontender

## 2022-08-16 NOTE — HISTORY OF PRESENT ILLNESS
[FreeTextEntry1] : \par Cait is 6 months s/p robotic reconstruction, post repair, sling\par very happy, no pain, no CASSANDRA, no incomplete emptying, does have vaginal dryness, no other complaints, no constipation

## 2022-08-16 NOTE — REASON FOR VISIT
[Follow-Up Visit_____] : a follow-up visit for [unfilled] [[employer] ] : provided by [employer]

## 2022-08-25 ENCOUNTER — APPOINTMENT (OUTPATIENT)
Dept: RHEUMATOLOGY | Facility: CLINIC | Age: 57
End: 2022-08-25

## 2022-08-25 VITALS
BODY MASS INDEX: 26.81 KG/M2 | HEIGHT: 61 IN | HEART RATE: 85 BPM | WEIGHT: 142 LBS | DIASTOLIC BLOOD PRESSURE: 77 MMHG | OXYGEN SATURATION: 97 % | SYSTOLIC BLOOD PRESSURE: 122 MMHG | RESPIRATION RATE: 18 BRPM

## 2022-08-25 DIAGNOSIS — M17.0 BILATERAL PRIMARY OSTEOARTHRITIS OF KNEE: ICD-10-CM

## 2022-08-25 DIAGNOSIS — M48.02 SPINAL STENOSIS, CERVICAL REGION: ICD-10-CM

## 2022-08-25 DIAGNOSIS — R06.02 SHORTNESS OF BREATH: ICD-10-CM

## 2022-08-25 PROCEDURE — 36415 COLL VENOUS BLD VENIPUNCTURE: CPT

## 2022-08-25 PROCEDURE — 99214 OFFICE O/P EST MOD 30 MIN: CPT | Mod: 25

## 2022-08-25 RX ORDER — AMOXICILLIN AND CLAVULANATE POTASSIUM 875; 125 MG/1; MG/1
875-125 TABLET, COATED ORAL
Qty: 30 | Refills: 0 | Status: COMPLETED | COMMUNITY
Start: 2022-04-28

## 2022-08-25 RX ORDER — OSELTAMIVIR PHOSPHATE 75 MG/1
75 CAPSULE ORAL
Qty: 10 | Refills: 0 | Status: COMPLETED | COMMUNITY
Start: 2022-04-18

## 2022-08-25 RX ORDER — OXYCODONE 5 MG/1
5 TABLET ORAL
Qty: 6 | Refills: 0 | Status: COMPLETED | COMMUNITY
Start: 2022-03-10

## 2022-08-26 LAB
ALBUMIN SERPL ELPH-MCNC: 4.3 G/DL
ALP BLD-CCNC: 118 U/L
ALT SERPL-CCNC: 37 U/L
ANION GAP SERPL CALC-SCNC: 13 MMOL/L
AST SERPL-CCNC: 31 U/L
BASOPHILS # BLD AUTO: 0.07 K/UL
BASOPHILS NFR BLD AUTO: 0.9 %
BILIRUB SERPL-MCNC: 0.2 MG/DL
BUN SERPL-MCNC: 18 MG/DL
CALCIUM SERPL-MCNC: 9.6 MG/DL
CHLORIDE SERPL-SCNC: 105 MMOL/L
CO2 SERPL-SCNC: 24 MMOL/L
CREAT SERPL-MCNC: 0.8 MG/DL
CRP SERPL-MCNC: <3 MG/L
EGFR: 85 ML/MIN/1.73M2
EOSINOPHIL # BLD AUTO: 0.27 K/UL
EOSINOPHIL NFR BLD AUTO: 3.4 %
ERYTHROCYTE [SEDIMENTATION RATE] IN BLOOD BY WESTERGREN METHOD: 7 MM/HR
GLUCOSE SERPL-MCNC: 85 MG/DL
HCT VFR BLD CALC: 48.1 %
HGB BLD-MCNC: 14.8 G/DL
IMM GRANULOCYTES NFR BLD AUTO: 1 %
LDH SERPL-CCNC: 232 U/L
LYMPHOCYTES # BLD AUTO: 3 K/UL
LYMPHOCYTES NFR BLD AUTO: 38.1 %
MAN DIFF?: NORMAL
MCHC RBC-ENTMCNC: 29.8 PG
MCHC RBC-ENTMCNC: 30.8 GM/DL
MCV RBC AUTO: 96.8 FL
MONOCYTES # BLD AUTO: 0.57 K/UL
MONOCYTES NFR BLD AUTO: 7.2 %
NEUTROPHILS # BLD AUTO: 3.88 K/UL
NEUTROPHILS NFR BLD AUTO: 49.4 %
PLATELET # BLD AUTO: 321 K/UL
POTASSIUM SERPL-SCNC: 4.4 MMOL/L
PROT SERPL-MCNC: 7 G/DL
RBC # BLD: 4.97 M/UL
RBC # FLD: 13.4 %
RHEUMATOID FACT SER QL: 20 IU/ML
SODIUM SERPL-SCNC: 142 MMOL/L
WBC # FLD AUTO: 7.87 K/UL

## 2022-08-30 LAB
ALBUMIN MFR SERPL ELPH: 58.1 %
ALBUMIN SERPL-MCNC: 4.1 G/DL
ALBUMIN/GLOB SERPL: 1.4 RATIO
ALPHA1 GLOB MFR SERPL ELPH: 3.7 %
ALPHA1 GLOB SERPL ELPH-MCNC: 0.3 G/DL
ALPHA2 GLOB MFR SERPL ELPH: 9.8 %
ALPHA2 GLOB SERPL ELPH-MCNC: 0.7 G/DL
B-GLOBULIN MFR SERPL ELPH: 11.1 %
B-GLOBULIN SERPL ELPH-MCNC: 0.8 G/DL
DEPRECATED KAPPA LC FREE/LAMBDA SER: 1 RATIO
GAMMA GLOB FLD ELPH-MCNC: 1.2 G/DL
GAMMA GLOB MFR SERPL ELPH: 17.3 %
IGA SER QL IEP: 223 MG/DL
IGG SER QL IEP: 1243 MG/DL
IGM SER QL IEP: 138 MG/DL
INTERPRETATION SERPL IEP-IMP: NORMAL
KAPPA LC CSF-MCNC: 2.18 MG/DL
KAPPA LC SERPL-MCNC: 2.17 MG/DL
M PROTEIN MFR SERPL ELPH: NORMAL
M PROTEIN SPEC IFE-MCNC: NORMAL
MONOCLON BAND OBS SERPL: NORMAL
PROT SERPL-MCNC: 7 G/DL
PROT SERPL-MCNC: 7 G/DL

## 2022-09-13 ENCOUNTER — APPOINTMENT (OUTPATIENT)
Dept: PULMONOLOGY | Facility: CLINIC | Age: 57
End: 2022-09-13

## 2022-09-13 VITALS
BODY MASS INDEX: 28.13 KG/M2 | SYSTOLIC BLOOD PRESSURE: 120 MMHG | OXYGEN SATURATION: 97 % | DIASTOLIC BLOOD PRESSURE: 78 MMHG | WEIGHT: 149 LBS | HEIGHT: 61 IN | RESPIRATION RATE: 16 BRPM | HEART RATE: 80 BPM

## 2022-09-13 PROCEDURE — 99214 OFFICE O/P EST MOD 30 MIN: CPT

## 2022-09-13 NOTE — HISTORY OF PRESENT ILLNESS
[Former] : former [TextBox_4] : 57F PMH asymptomatic COVID (09/2021), Sjogren's, ILD on MMF and 5mg Q48H Prednisone who presents for follow up. She is on Breo ellipta 100 and Omeprazole, as well as Prednisone 5mg Q48H. No fevers, no chills, no chest pain, no SOB, no cough. A week ago had increased sputum production which has resolved. She is taking Omeprazole daily. She uses Breo every day and rarely uses Albuterol. \par \par She will be having her breast implants removed and downsized in [facility] next month with Dr. Jack Vergara # [phone] [TextBox_11] : 0.1 [TextBox_13] : 4 [YearQuit] : 1991

## 2022-09-19 PROBLEM — M48.02 CERVICAL STENOSIS OF SPINE: Status: ACTIVE | Noted: 2017-01-18

## 2022-09-19 PROBLEM — M17.0 PRIMARY OSTEOARTHRITIS OF BOTH KNEES: Status: ACTIVE | Noted: 2017-06-15

## 2022-09-29 ENCOUNTER — APPOINTMENT (OUTPATIENT)
Dept: OBGYN | Facility: CLINIC | Age: 57
End: 2022-09-29

## 2022-09-29 DIAGNOSIS — Z01.419 ENCOUNTER FOR GYNECOLOGICAL EXAMINATION (GENERAL) (ROUTINE) W/OUT ABNORMAL FINDINGS: ICD-10-CM

## 2022-09-29 DIAGNOSIS — Z13.9 ENCOUNTER FOR SCREENING, UNSPECIFIED: ICD-10-CM

## 2022-09-29 DIAGNOSIS — Z01.818 ENCOUNTER FOR OTHER PREPROCEDURAL EXAMINATION: ICD-10-CM

## 2022-09-29 DIAGNOSIS — Z12.31 ENCOUNTER FOR SCREENING MAMMOGRAM FOR MALIGNANT NEOPLASM OF BREAST: ICD-10-CM

## 2022-09-29 DIAGNOSIS — R92.2 INCONCLUSIVE MAMMOGRAM: ICD-10-CM

## 2022-09-29 DIAGNOSIS — N94.10 UNSPECIFIED DYSPAREUNIA: ICD-10-CM

## 2022-09-29 DIAGNOSIS — N95.2 POSTMENOPAUSAL ATROPHIC VAGINITIS: ICD-10-CM

## 2022-09-29 DIAGNOSIS — Z12.11 ENCOUNTER FOR SCREENING FOR MALIGNANT NEOPLASM OF COLON: ICD-10-CM

## 2022-09-29 PROCEDURE — 99213 OFFICE O/P EST LOW 20 MIN: CPT | Mod: 25

## 2022-09-29 PROCEDURE — 99396 PREV VISIT EST AGE 40-64: CPT

## 2022-09-29 PROCEDURE — 82270 OCCULT BLOOD FECES: CPT

## 2022-09-29 RX ORDER — PREDNISONE 2.5 MG/1
2.5 TABLET ORAL
Qty: 15 | Refills: 3 | Status: DISCONTINUED | COMMUNITY
Start: 2022-09-13 | End: 2022-09-29

## 2022-09-30 LAB
DATE COLLECTED: NORMAL
HEMOCCULT SP1 STL QL: NEGATIVE
QUALITY CONTROL: YES

## 2022-10-02 NOTE — END OF VISIT
[FreeTextEntry3] : I, Wellington Oden solely acted as scribe for Dr. Karmen Gerard on 09/29/2022 \par All medical entries made by the Scribe were at my, Dr. Gerard’s, direction and personally\par dictated by me on 09/29/2022 . I have reviewed the chart and agree that the record\par accurately reflects my personal performance of the history, physical exam, assessment and plan. I\par have also personally directed, reviewed, and agreed with the chart.

## 2022-10-02 NOTE — PHYSICAL EXAM
[Chaperone Present] : A chaperone was present in the examining room during all aspects of the physical examination [Appropriately responsive] : appropriately responsive [Alert] : alert [No Acute Distress] : no acute distress [Soft] : soft [Non-tender] : non-tender [Non-distended] : non-distended [Oriented x3] : oriented x3 [Examination Of The Breasts] : a normal appearance [No Discharge] : no discharge [No Masses] : no breast masses were palpable [Labia Majora] : normal [Labia Minora] : normal [Normal] : normal [No Bleeding] : There was no active vaginal bleeding [Absent] : absent [FreeTextEntry1] : MOA: Brigida LOCKHART  [Atrophy] : atrophy [Dry Mucosa] : dry mucosa [FreeTextEntry9] : Stool for occult blood.

## 2022-10-02 NOTE — DISCUSSION/SUMMARY
[FreeTextEntry1] : Benign breast and pelvic exam. Uterus and cervix are surgically absent. Pap not collected. Stool for occult blood was negative. \par \par Prescription for mammogram screening and breast US given.\par \par Self-breast exam reviewed.\par \par We discussed HRT as a possible option for her secondary to vaginal dryness. Side effects and risks discussed. Patient agrees to HRT. Prescription given. \par \par She will follow up annually and as needed.

## 2022-10-02 NOTE — HISTORY OF PRESENT ILLNESS
[N] : Patient reports normal menses [Y] : Positive pregnancy history [Difficulty with [place]] : difficulty with intercourse [Menarche Age: ____] : age at menarche was [unfilled] [Currently Active] : currently active [Men] : men [Mammogramdate] : 10/14/2021 [TextBox_19] : BR2 [LMPDate] : 2012 [de-identified] : Had a Hysterectomy  [PGHxTotal] : 5 [[facility] : 3 [[facility] Hospital[facility]] : 3 [PGHxABSpont] : 2 [TextBox_6] : Vaginal Dryness [FreeTextEntry1] : 2012

## 2022-10-03 ENCOUNTER — APPOINTMENT (OUTPATIENT)
Dept: PULMONOLOGY | Facility: CLINIC | Age: 57
End: 2022-10-03

## 2022-10-03 VITALS
RESPIRATION RATE: 16 BRPM | DIASTOLIC BLOOD PRESSURE: 76 MMHG | OXYGEN SATURATION: 96 % | HEART RATE: 100 BPM | SYSTOLIC BLOOD PRESSURE: 128 MMHG

## 2022-10-03 VITALS — HEIGHT: 61 IN | BODY MASS INDEX: 27.94 KG/M2 | WEIGHT: 148 LBS

## 2022-10-03 PROCEDURE — 94010 BREATHING CAPACITY TEST: CPT

## 2022-10-03 PROCEDURE — 94727 GAS DIL/WSHOT DETER LNG VOL: CPT

## 2022-10-03 PROCEDURE — 94729 DIFFUSING CAPACITY: CPT

## 2022-10-03 PROCEDURE — 99214 OFFICE O/P EST MOD 30 MIN: CPT | Mod: 25

## 2022-10-03 PROCEDURE — 85018 HEMOGLOBIN: CPT | Mod: QW

## 2022-10-03 NOTE — HISTORY OF PRESENT ILLNESS
[Former] : former [TextBox_4] : 57F PMH asymptomatic COVID (09/2021), Sjogren's, ILD on MMF and 5mg Q48H Prednisone who presents for follow up. She is on Breo ellipta 100 and Omeprazole. She is planned to have her breast implants removed and downsized in [facility] with Dr. Jack Vergara # [phone]. She is on Prednisone 5mg Q48H. PFT today showed FEV1/FVC 83.5%, FEV1 1.73L (78.8%, FVC 2.07L (77.1%), TLC 3.04L (70.9%), DLCOc 49.3%. This is consistent with mild-to-moderate restrictive lung disease. In comparison, PFT in 02/2022 showed FEV1/FVC 84.2%, FEV1 1.92L (86.5%), FVC 2.28L (84.1%), TLC 3.60L (84.1%), DLCOc 60.1%.  [TextBox_11] : 0.1 [TextBox_13] : 4 [YearQuit] : 1991

## 2022-10-04 LAB — SARS-COV-2 N GENE NPH QL NAA+PROBE: NOT DETECTED

## 2022-10-17 ENCOUNTER — RESULT REVIEW (OUTPATIENT)
Age: 57
End: 2022-10-17

## 2022-10-17 ENCOUNTER — APPOINTMENT (OUTPATIENT)
Dept: ULTRASOUND IMAGING | Facility: CLINIC | Age: 57
End: 2022-10-17

## 2022-10-17 ENCOUNTER — APPOINTMENT (OUTPATIENT)
Dept: MAMMOGRAPHY | Facility: CLINIC | Age: 57
End: 2022-10-17

## 2022-10-17 ENCOUNTER — OUTPATIENT (OUTPATIENT)
Dept: OUTPATIENT SERVICES | Facility: HOSPITAL | Age: 57
LOS: 1 days | End: 2022-10-17
Payer: COMMERCIAL

## 2022-10-17 DIAGNOSIS — Z98.890 OTHER SPECIFIED POSTPROCEDURAL STATES: Chronic | ICD-10-CM

## 2022-10-17 DIAGNOSIS — Z98.82 BREAST IMPLANT STATUS: Chronic | ICD-10-CM

## 2022-10-17 DIAGNOSIS — Z00.00 ENCOUNTER FOR GENERAL ADULT MEDICAL EXAMINATION WITHOUT ABNORMAL FINDINGS: ICD-10-CM

## 2022-10-17 DIAGNOSIS — Z90.710 ACQUIRED ABSENCE OF BOTH CERVIX AND UTERUS: Chronic | ICD-10-CM

## 2022-10-17 PROCEDURE — 77063 BREAST TOMOSYNTHESIS BI: CPT | Mod: 26

## 2022-10-17 PROCEDURE — 77067 SCR MAMMO BI INCL CAD: CPT | Mod: 26

## 2022-10-17 PROCEDURE — 76641 ULTRASOUND BREAST COMPLETE: CPT | Mod: 26,50

## 2022-10-17 PROCEDURE — 77063 BREAST TOMOSYNTHESIS BI: CPT

## 2022-10-17 PROCEDURE — 77067 SCR MAMMO BI INCL CAD: CPT

## 2022-10-17 PROCEDURE — 76641 ULTRASOUND BREAST COMPLETE: CPT

## 2022-11-04 ENCOUNTER — NON-APPOINTMENT (OUTPATIENT)
Age: 57
End: 2022-11-04

## 2022-12-09 ENCOUNTER — APPOINTMENT (OUTPATIENT)
Dept: PULMONOLOGY | Facility: CLINIC | Age: 57
End: 2022-12-09

## 2022-12-28 ENCOUNTER — APPOINTMENT (OUTPATIENT)
Dept: PULMONOLOGY | Facility: CLINIC | Age: 57
End: 2022-12-28

## 2022-12-28 VITALS
DIASTOLIC BLOOD PRESSURE: 64 MMHG | BODY MASS INDEX: 26.62 KG/M2 | HEART RATE: 81 BPM | HEIGHT: 61 IN | SYSTOLIC BLOOD PRESSURE: 110 MMHG | WEIGHT: 141 LBS | OXYGEN SATURATION: 97 %

## 2022-12-28 PROCEDURE — 99214 OFFICE O/P EST MOD 30 MIN: CPT

## 2022-12-28 RX ORDER — GABAPENTIN 100 MG/1
100 CAPSULE ORAL
Qty: 90 | Refills: 2 | Status: DISCONTINUED | COMMUNITY
Start: 2021-08-24 | End: 2022-12-28

## 2022-12-28 RX ORDER — AMLODIPINE BESYLATE VALSARTAN HYDROCHLOROTHIAZIDE 10; 25; 320 MG/1; MG/1; MG/1
10-320-25 TABLET, FILM COATED ORAL
Qty: 30 | Refills: 0 | Status: DISCONTINUED | COMMUNITY
Start: 2022-02-28 | End: 2022-12-28

## 2022-12-28 RX ORDER — PREDNISONE 5 MG/1
5 TABLET ORAL
Qty: 15 | Refills: 3 | Status: DISCONTINUED | COMMUNITY
Start: 2022-01-25 | End: 2022-12-28

## 2022-12-28 NOTE — HISTORY OF PRESENT ILLNESS
[Former] : former [TextBox_4] : 57F PMH asymptomatic COVID (09/2021), Sjogren's, ILD on MMF and 5mg Q48H Prednisone who presents for follow up. She is on Breo ellipta 100. Had her breast implants removed in [facility] 10/27/22. Daughter reports she is having increased sputum. Pt denies worsening SOB. No N/V/D. No fevers, no chills. No recent illness.  [TextBox_11] : 0.1 [TextBox_13] : 4 [YearQuit] : 1991

## 2023-01-05 ENCOUNTER — APPOINTMENT (OUTPATIENT)
Dept: RHEUMATOLOGY | Facility: CLINIC | Age: 58
End: 2023-01-05
Payer: MEDICARE

## 2023-01-05 VITALS
HEIGHT: 61 IN | OXYGEN SATURATION: 95 % | HEART RATE: 86 BPM | DIASTOLIC BLOOD PRESSURE: 83 MMHG | WEIGHT: 141 LBS | TEMPERATURE: 97.2 F | BODY MASS INDEX: 26.62 KG/M2 | SYSTOLIC BLOOD PRESSURE: 119 MMHG

## 2023-01-05 DIAGNOSIS — R91.8 OTHER NONSPECIFIC ABNORMAL FINDING OF LUNG FIELD: ICD-10-CM

## 2023-01-05 PROCEDURE — 99214 OFFICE O/P EST MOD 30 MIN: CPT | Mod: 25

## 2023-01-06 PROBLEM — R91.8 GROUND GLASS OPACITY PRESENT ON IMAGING OF LUNG: Status: ACTIVE | Noted: 2021-10-22

## 2023-01-06 LAB
ALBUMIN SERPL ELPH-MCNC: 3.9 G/DL
ALP BLD-CCNC: 108 U/L
ALT SERPL-CCNC: 24 U/L
ANION GAP SERPL CALC-SCNC: 9 MMOL/L
AST SERPL-CCNC: 20 U/L
BASOPHILS # BLD AUTO: 0.05 K/UL
BASOPHILS NFR BLD AUTO: 0.6 %
BILIRUB SERPL-MCNC: <0.2 MG/DL
BUN SERPL-MCNC: 16 MG/DL
CALCIUM SERPL-MCNC: 9.5 MG/DL
CHLORIDE SERPL-SCNC: 108 MMOL/L
CO2 SERPL-SCNC: 27 MMOL/L
CREAT SERPL-MCNC: 1.04 MG/DL
CRP SERPL-MCNC: <3 MG/L
EGFR: 62 ML/MIN/1.73M2
EOSINOPHIL # BLD AUTO: 0.2 K/UL
EOSINOPHIL NFR BLD AUTO: 2.5 %
ERYTHROCYTE [SEDIMENTATION RATE] IN BLOOD BY WESTERGREN METHOD: 8 MM/HR
GLUCOSE SERPL-MCNC: 82 MG/DL
HCT VFR BLD CALC: 43.2 %
HGB BLD-MCNC: 13.7 G/DL
IMM GRANULOCYTES NFR BLD AUTO: 0.5 %
LDH SERPL-CCNC: 230 U/L
LYMPHOCYTES # BLD AUTO: 2.29 K/UL
LYMPHOCYTES NFR BLD AUTO: 29 %
MAN DIFF?: NORMAL
MCHC RBC-ENTMCNC: 30 PG
MCHC RBC-ENTMCNC: 31.7 GM/DL
MCV RBC AUTO: 94.7 FL
MONOCYTES # BLD AUTO: 0.48 K/UL
MONOCYTES NFR BLD AUTO: 6.1 %
NEUTROPHILS # BLD AUTO: 4.85 K/UL
NEUTROPHILS NFR BLD AUTO: 61.3 %
PLATELET # BLD AUTO: 319 K/UL
POTASSIUM SERPL-SCNC: 4.4 MMOL/L
PROT SERPL-MCNC: 6.7 G/DL
RBC # BLD: 4.56 M/UL
RBC # FLD: 12.6 %
SODIUM SERPL-SCNC: 144 MMOL/L
WBC # FLD AUTO: 7.91 K/UL

## 2023-01-09 ENCOUNTER — APPOINTMENT (OUTPATIENT)
Dept: RADIOLOGY | Facility: CLINIC | Age: 58
End: 2023-01-09
Payer: MEDICARE

## 2023-01-09 ENCOUNTER — OUTPATIENT (OUTPATIENT)
Dept: OUTPATIENT SERVICES | Facility: HOSPITAL | Age: 58
LOS: 1 days | End: 2023-01-09
Payer: COMMERCIAL

## 2023-01-09 DIAGNOSIS — Z13.820 ENCOUNTER FOR SCREENING FOR OSTEOPOROSIS: ICD-10-CM

## 2023-01-09 DIAGNOSIS — Z90.710 ACQUIRED ABSENCE OF BOTH CERVIX AND UTERUS: Chronic | ICD-10-CM

## 2023-01-09 DIAGNOSIS — Z98.890 OTHER SPECIFIED POSTPROCEDURAL STATES: Chronic | ICD-10-CM

## 2023-01-09 DIAGNOSIS — Z98.82 BREAST IMPLANT STATUS: Chronic | ICD-10-CM

## 2023-01-09 PROCEDURE — 77085 DXA BONE DENSITY AXL VRT FX: CPT | Mod: 26

## 2023-01-09 PROCEDURE — 77085 DXA BONE DENSITY AXL VRT FX: CPT

## 2023-01-13 ENCOUNTER — APPOINTMENT (OUTPATIENT)
Dept: GASTROENTEROLOGY | Facility: CLINIC | Age: 58
End: 2023-01-13
Payer: MEDICARE

## 2023-01-13 VITALS
WEIGHT: 150 LBS | HEART RATE: 81 BPM | BODY MASS INDEX: 28.32 KG/M2 | OXYGEN SATURATION: 99 % | RESPIRATION RATE: 16 BRPM | HEIGHT: 61 IN | SYSTOLIC BLOOD PRESSURE: 106 MMHG | DIASTOLIC BLOOD PRESSURE: 74 MMHG

## 2023-01-13 DIAGNOSIS — Z76.0 ENCOUNTER FOR ISSUE OF REPEAT PRESCRIPTION: ICD-10-CM

## 2023-01-13 PROCEDURE — 99214 OFFICE O/P EST MOD 30 MIN: CPT

## 2023-01-13 NOTE — HISTORY OF PRESENT ILLNESS
[de-identified] : 10/22/2018.  Gastritis and duodenitis with negative H. pylori biopsies. [FreeTextEntry1] : 8/9/2018.  Internal hemorrhoids.

## 2023-01-13 NOTE — PHYSICAL EXAM
[Alert] : alert [Normal Voice/Communication] : normal voice/communication [Healthy Appearing] : healthy appearing [No Acute Distress] : no acute distress [Well Developed] : well developed [Well Nourished] : well nourished [Sclera] : the sclera and conjunctiva were normal [Hearing Threshold Finger Rub Not Mayes] : hearing was normal [Normal Lips/Gums] : the lips and gums were normal [Oropharynx] : the oropharynx was normal [Normal Appearance] : the appearance of the neck was normal [No Neck Mass] : no neck mass was observed [No Respiratory Distress] : no respiratory distress [No Acc Muscle Use] : no accessory muscle use [Respiration, Rhythm And Depth] : normal respiratory rhythm and effort [Auscultation Breath Sounds / Voice Sounds] : lungs were clear to auscultation bilaterally [Heart Rate And Rhythm] : heart rate was normal and rhythm regular [Normal S1, S2] : normal S1 and S2 [Murmurs] : no murmurs [Bowel Sounds] : normal bowel sounds [Abdomen Tenderness] : non-tender [No Masses] : no abdominal mass palpated [Abdomen Soft] : soft [] : no hepatosplenomegaly [No CVA Tenderness] : no CVA  tenderness [Abnormal Walk] : normal gait [No Joint Swelling] : no joint swelling seen [Normal Color / Pigmentation] : normal skin color and pigmentation [Oriented To Time, Place, And Person] : oriented to person, place, and time [de-identified] : Deferred at this time

## 2023-01-13 NOTE — ASSESSMENT
[FreeTextEntry1] : Impression: Chronic GERD more or less controlled with omeprazole 40 mg daily with intermittent breakthrough nighttime symptoms.\par \par Recommendations: Famotidine 40 mg nightly will be added to her a.m. omeprazole therapy for hopefully more effective acid suppression especially at night.  A low-fat antireflux diet was recommended and explained to the patient today who was advised to return here in 6 months time for follow-up evaluation or sooner if necessary.  She appeared to understand all of the above instructions, information, and management plan which were explained to her today in Indonesian by myself who speaks Indonesian.

## 2023-01-13 NOTE — REVIEW OF SYSTEMS
[As Noted in HPI] : as noted in HPI [Heartburn] : heartburn [Negative] : Heme/Lymph [Abdominal Pain] : no abdominal pain [Vomiting] : no vomiting [Constipation] : no constipation [Diarrhea] : no diarrhea [Melena (black stool)] : no melena [Bleeding] : no bleeding [Fecal Incontinence (soiling)] : no fecal incontinence [Bloating (gassiness)] : no bloating

## 2023-02-17 ENCOUNTER — OFFICE (OUTPATIENT)
Dept: URBAN - METROPOLITAN AREA CLINIC 94 | Facility: CLINIC | Age: 58
Setting detail: OPHTHALMOLOGY
End: 2023-02-17
Payer: MEDICARE

## 2023-02-17 DIAGNOSIS — H16.223: ICD-10-CM

## 2023-02-17 DIAGNOSIS — H43.393: ICD-10-CM

## 2023-02-17 DIAGNOSIS — H25.12: ICD-10-CM

## 2023-02-17 DIAGNOSIS — H25.13: ICD-10-CM

## 2023-02-17 PROBLEM — H25.11 CATARACT SENILE NUCLEAR SCLEROSIS; RIGHT EYE, LEFT EYE, BOTH EYES: Status: ACTIVE | Noted: 2023-02-17

## 2023-02-17 PROCEDURE — 99214 OFFICE O/P EST MOD 30 MIN: CPT | Performed by: OPHTHALMOLOGY

## 2023-02-17 PROCEDURE — 92250 FUNDUS PHOTOGRAPHY W/I&R: CPT | Performed by: OPHTHALMOLOGY

## 2023-02-17 PROCEDURE — 92136 OPHTHALMIC BIOMETRY: CPT | Performed by: OPHTHALMOLOGY

## 2023-02-17 ASSESSMENT — REFRACTION_CURRENTRX
OS_AXIS: 000
OS_SPHERE: +1.00
OD_ADD: +3.00
OD_CYLINDER: 0.00
OD_AXIS: 000
OS_SPHERE: +1.00
OS_ADD: +3.00
OD_SPHERE: +1.25
OD_OVR_VA: 20/
OD_AXIS: 000
OD_OVR_VA: 20/
OS_CYLINDER: 0.00
OD_VPRISM_DIRECTION: PROGS
OD_SPHERE: +1.25
OD_CYLINDER: 0.00
OD_VPRISM_DIRECTION: SV
OS_VPRISM_DIRECTION: SV
OD_ADD: +2.50
OS_OVR_VA: 20/
OS_VPRISM_DIRECTION: PROGS
OS_AXIS: 000
OS_ADD: +2.75
OS_CYLINDER: 0.00
OS_OVR_VA: 20/

## 2023-02-17 ASSESSMENT — REFRACTION_MANIFEST
OD_CYLINDER: 0.00
OD_SPHERE: +2.00
OS_AXIS: 107
OD_AXIS: 000
OD_VA1: 20/150
OS_VA1: 20/200
OS_CYLINDER: -0.25
OS_SPHERE: +1.50

## 2023-02-17 ASSESSMENT — SPHEQUIV_DERIVED
OD_SPHEQUIV: 2
OD_SPHEQUIV: 2
OS_SPHEQUIV: 1.375
OS_SPHEQUIV: 1.375

## 2023-02-17 ASSESSMENT — REFRACTION_AUTOREFRACTION
OS_SPHERE: +1.50
OD_CYLINDER: 0.00
OS_CYLINDER: -0.25
OD_SPHERE: +2.00
OD_AXIS: 000
OS_AXIS: 107

## 2023-02-17 ASSESSMENT — KERATOMETRY
OS_K2POWER_DIOPTERS: 42.50
OD_AXISANGLE2_DEGREES: 084
OD_CYLAXISANGLE_DEGREES: 084
OD_AXISANGLE_DEGREES: 174
OD_K2POWER_DIOPTERS: 42.50
OS_AXISANGLE2_DEGREES: 089
OS_K1POWER_DIOPTERS: 41.75
OS_K2POWER_DIOPTERS: 42.50
OS_AXISANGLE_DEGREES: 089
OD_K2POWER_DIOPTERS: 42.50
OS_K1POWER_DIOPTERS: 41.75
OS_AXISANGLE_DEGREES: 179
OD_K1POWER_DIOPTERS: 41.75
METHOD_AUTO_MANUAL: AUTO
OD_AXISANGLE_DEGREES: 084
OS_K1K2_AVERAGE: 42.125
OS_CYLPOWER_DEGREES: 0.75
OS_CYLAXISANGLE_DEGREES: 089
OD_K1POWER_DIOPTERS: 41.75
OD_K1K2_AVERAGE: 42.125
OD_CYLPOWER_DEGREES: 0.75

## 2023-02-17 ASSESSMENT — VISUAL ACUITY
OS_BCVA: 20/150
OD_BCVA: 20/200

## 2023-02-17 ASSESSMENT — TONOMETRY
OS_IOP_MMHG: 20
OD_IOP_MMHG: 17

## 2023-02-17 ASSESSMENT — SUPERFICIAL PUNCTATE KERATITIS (SPK)
OS_SPK: 2+
OD_SPK: 2+

## 2023-02-17 ASSESSMENT — AXIALLENGTH_DERIVED
OD_AL: 23.3209
OS_AL: 23.5613
OD_AL: 23.3209
OS_AL: 23.5613

## 2023-02-17 ASSESSMENT — CONFRONTATIONAL VISUAL FIELD TEST (CVF)
OD_FINDINGS: FULL
OS_FINDINGS: FULL

## 2023-04-07 ENCOUNTER — ASC (OUTPATIENT)
Dept: URBAN - METROPOLITAN AREA SURGERY 8 | Facility: SURGERY | Age: 58
Setting detail: OPHTHALMOLOGY
End: 2023-04-07
Payer: MEDICARE

## 2023-04-07 DIAGNOSIS — H25.12: ICD-10-CM

## 2023-04-07 PROCEDURE — 66984 XCAPSL CTRC RMVL W/O ECP: CPT | Performed by: OPHTHALMOLOGY

## 2023-04-07 ASSESSMENT — SUPERFICIAL PUNCTATE KERATITIS (SPK)
OD_SPK: 2+
OS_SPK: 2+

## 2023-04-07 ASSESSMENT — CORNEAL EDEMA CLINICAL DESCRIPTION: OS_CORNEALEDEMA: T

## 2023-04-08 ENCOUNTER — RX ONLY (RX ONLY)
Age: 58
End: 2023-04-08

## 2023-04-08 ENCOUNTER — OFFICE (OUTPATIENT)
Dept: URBAN - METROPOLITAN AREA CLINIC 94 | Facility: CLINIC | Age: 58
Setting detail: OPHTHALMOLOGY
End: 2023-04-08
Payer: MEDICARE

## 2023-04-08 DIAGNOSIS — Z96.1: ICD-10-CM

## 2023-04-08 PROCEDURE — 99024 POSTOP FOLLOW-UP VISIT: CPT | Performed by: REGISTERED NURSE

## 2023-04-08 ASSESSMENT — REFRACTION_CURRENTRX
OD_OVR_VA: 20/
OD_VPRISM_DIRECTION: PROGS
OS_AXIS: 000
OD_OVR_VA: 20/
OS_AXIS: 000
OD_ADD: +3.00
OS_ADD: +3.00
OS_OVR_VA: 20/
OS_OVR_VA: 20/
OS_SPHERE: +1.00
OS_VPRISM_DIRECTION: SV
OD_CYLINDER: 0.00
OS_CYLINDER: 0.00
OS_VPRISM_DIRECTION: PROGS
OS_CYLINDER: 0.00
OD_SPHERE: +1.25
OS_SPHERE: +1.00
OD_SPHERE: +1.25
OD_AXIS: 000
OD_CYLINDER: 0.00
OD_ADD: +2.50
OD_AXIS: 000
OS_ADD: +2.75
OD_VPRISM_DIRECTION: SV

## 2023-04-08 ASSESSMENT — KERATOMETRY
OD_AXISANGLE_DEGREES: 084
METHOD_AUTO_MANUAL: AUTO
OD_K2POWER_DIOPTERS: 42.50
OS_K1POWER_DIOPTERS: 41.75
OS_K2POWER_DIOPTERS: 42.50
OD_K1POWER_DIOPTERS: 41.75
OS_AXISANGLE_DEGREES: 085

## 2023-04-08 ASSESSMENT — REFRACTION_MANIFEST
OD_SPHERE: +2.00
OS_SPHERE: +1.50
OS_AXIS: 107
OD_VA1: 20/150
OD_CYLINDER: 0.00
OS_CYLINDER: -0.25
OD_AXIS: 000
OS_VA1: 20/200

## 2023-04-08 ASSESSMENT — REFRACTION_AUTOREFRACTION
OD_AXIS: 086
OS_SPHERE: +0.25
OD_SPHERE: +2.25
OS_CYLINDER: -0.25
OD_CYLINDER: -0.25
OS_AXIS: 167

## 2023-04-08 ASSESSMENT — CONFRONTATIONAL VISUAL FIELD TEST (CVF)
OS_FINDINGS: FULL
OD_FINDINGS: FULL

## 2023-04-08 ASSESSMENT — AXIALLENGTH_DERIVED
OS_AL: 23.5613
OS_AL: 24.0573
OD_AL: 23.3209
OD_AL: 23.2734

## 2023-04-08 ASSESSMENT — VISUAL ACUITY
OS_BCVA: 20/250
OD_BCVA: 20/30

## 2023-04-08 ASSESSMENT — SPHEQUIV_DERIVED
OS_SPHEQUIV: 0.125
OD_SPHEQUIV: 2.125
OD_SPHEQUIV: 2
OS_SPHEQUIV: 1.375

## 2023-04-08 ASSESSMENT — TONOMETRY
OS_IOP_MMHG: 20
OD_IOP_MMHG: 20

## 2023-04-12 ENCOUNTER — OFFICE (OUTPATIENT)
Dept: URBAN - METROPOLITAN AREA CLINIC 94 | Facility: CLINIC | Age: 58
Setting detail: OPHTHALMOLOGY
End: 2023-04-12
Payer: MEDICARE

## 2023-04-12 DIAGNOSIS — H25.11: ICD-10-CM

## 2023-04-12 PROCEDURE — 99024 POSTOP FOLLOW-UP VISIT: CPT | Performed by: OPHTHALMOLOGY

## 2023-04-12 PROCEDURE — 92136 OPHTHALMIC BIOMETRY: CPT | Performed by: OPHTHALMOLOGY

## 2023-04-12 ASSESSMENT — REFRACTION_MANIFEST
OS_AXIS: 107
OS_CYLINDER: SPHERE
OS_CYLINDER: -0.25
OS_AXIS: 000
OS_SPHERE: PLANO
OS_VA1: 20/200
OS_VA1: 20/25
OD_AXIS: 000
OS_SPHERE: +1.50
OD_CYLINDER: 0.00
OD_VA1: 20/150
OD_SPHERE: +2.00

## 2023-04-12 ASSESSMENT — REFRACTION_CURRENTRX
OS_OVR_VA: 20/
OD_VPRISM_DIRECTION: PROGS
OD_OVR_VA: 20/
OD_CYLINDER: 0.00
OS_ADD: +3.00
OS_VPRISM_DIRECTION: SV
OS_CYLINDER: 0.00
OS_AXIS: 000
OS_VPRISM_DIRECTION: PROGS
OD_ADD: +2.50
OD_VPRISM_DIRECTION: SV
OD_SPHERE: +1.25
OD_AXIS: 000
OS_SPHERE: +1.00
OS_CYLINDER: 0.00
OD_AXIS: 000
OD_CYLINDER: 0.00
OS_SPHERE: +1.00
OS_OVR_VA: 20/
OD_SPHERE: +1.25
OS_AXIS: 000
OS_ADD: +2.75
OD_OVR_VA: 20/
OD_ADD: +3.00

## 2023-04-12 ASSESSMENT — REFRACTION_AUTOREFRACTION
OS_SPHERE: 0.00
OS_CYLINDER: -0.25
OS_AXIS: 175
OD_SPHERE: +2.25
OD_AXIS: 053
OD_CYLINDER: -0.25

## 2023-04-12 ASSESSMENT — SUPERFICIAL PUNCTATE KERATITIS (SPK)
OD_SPK: 2+
OS_SPK: 2+

## 2023-04-12 ASSESSMENT — AXIALLENGTH_DERIVED
OS_AL: 23.5613
OD_AL: 23.2734
OS_AL: 24.159
OD_AL: 23.3209

## 2023-04-12 ASSESSMENT — CORNEAL EDEMA CLINICAL DESCRIPTION: OS_CORNEALEDEMA: T

## 2023-04-12 ASSESSMENT — KERATOMETRY
OD_K1POWER_DIOPTERS: 41.75
OS_K1POWER_DIOPTERS: 41.75
OS_K2POWER_DIOPTERS: 42.50
OD_K2POWER_DIOPTERS: 42.50
OD_AXISANGLE_DEGREES: 091
METHOD_AUTO_MANUAL: AUTO
OS_AXISANGLE_DEGREES: 089

## 2023-04-12 ASSESSMENT — SPHEQUIV_DERIVED
OD_SPHEQUIV: 2
OD_SPHEQUIV: 2.125
OS_SPHEQUIV: 1.375
OS_SPHEQUIV: -0.125

## 2023-04-12 ASSESSMENT — VISUAL ACUITY
OS_BCVA: 20/150
OD_BCVA: 20/25

## 2023-04-12 ASSESSMENT — CONFRONTATIONAL VISUAL FIELD TEST (CVF)
OD_FINDINGS: FULL
OS_FINDINGS: FULL

## 2023-04-12 ASSESSMENT — TONOMETRY: OD_IOP_MMHG: 20

## 2023-04-17 ENCOUNTER — APPOINTMENT (OUTPATIENT)
Dept: PULMONOLOGY | Facility: CLINIC | Age: 58
End: 2023-04-17
Payer: MEDICARE

## 2023-04-17 VITALS
RESPIRATION RATE: 16 BRPM | OXYGEN SATURATION: 96 % | WEIGHT: 144 LBS | BODY MASS INDEX: 28.27 KG/M2 | HEIGHT: 60 IN | DIASTOLIC BLOOD PRESSURE: 78 MMHG | SYSTOLIC BLOOD PRESSURE: 110 MMHG | HEART RATE: 69 BPM

## 2023-04-17 VITALS — BODY MASS INDEX: 28.27 KG/M2 | WEIGHT: 144 LBS | HEIGHT: 60 IN

## 2023-04-17 PROCEDURE — 99214 OFFICE O/P EST MOD 30 MIN: CPT | Mod: 25

## 2023-04-17 PROCEDURE — 94010 BREATHING CAPACITY TEST: CPT

## 2023-04-17 PROCEDURE — 85018 HEMOGLOBIN: CPT | Mod: QW

## 2023-04-17 PROCEDURE — 94729 DIFFUSING CAPACITY: CPT

## 2023-04-17 PROCEDURE — 94727 GAS DIL/WSHOT DETER LNG VOL: CPT

## 2023-04-17 NOTE — HISTORY OF PRESENT ILLNESS
[Former] : former [TextBox_4] : 58F PMH asymptomatic COVID (09/2021), former smoker, Sjogren's, ILD on MMF and 5mg Q48H Prednisone who presents for follow up. She is on Breo ellipta 200. PFT today showed FEV1/FVC 85%, FEV1 80.3%, FVC 77.4%, FEF 25-75 88%, TLC 78.7%, DLCOc 71.2%. Previously in 10/2022 her FEV1/FVC 83.5%, FEV1 79%, FVC 77%, TLC 71%, DLCOc 49.3%. No chest pain, no SOB. No cough. No F/C. \par \par [employer]  #[id number] [TextBox_11] : 0.1 [TextBox_13] : 4 [YearQuit] : 1991

## 2023-04-19 ENCOUNTER — OFFICE (OUTPATIENT)
Dept: URBAN - METROPOLITAN AREA CLINIC 94 | Facility: CLINIC | Age: 58
Setting detail: OPHTHALMOLOGY
End: 2023-04-19
Payer: MEDICARE

## 2023-04-19 DIAGNOSIS — H25.11: ICD-10-CM

## 2023-04-19 DIAGNOSIS — Z96.1: ICD-10-CM

## 2023-04-19 PROCEDURE — 92136 OPHTHALMIC BIOMETRY: CPT | Performed by: OPHTHALMOLOGY

## 2023-04-19 PROCEDURE — 99024 POSTOP FOLLOW-UP VISIT: CPT | Performed by: OPHTHALMOLOGY

## 2023-04-19 ASSESSMENT — SPHEQUIV_DERIVED
OD_SPHEQUIV: 2
OS_SPHEQUIV: -0.5
OD_SPHEQUIV: 2
OS_SPHEQUIV: 1.375
OD_SPHEQUIV: 2
OS_SPHEQUIV: -0.5

## 2023-04-19 ASSESSMENT — REFRACTION_CURRENTRX
OD_VPRISM_DIRECTION: SV
OS_SPHERE: +1.00
OD_CYLINDER: 0.00
OS_ADD: +3.00
OD_AXIS: 000
OD_ADD: +2.50
OS_AXIS: 000
OS_CYLINDER: 0.00
OS_VPRISM_DIRECTION: SV
OS_OVR_VA: 20/
OD_ADD: +3.00
OD_AXIS: 000
OS_AXIS: 000
OD_SPHERE: +1.25
OS_VPRISM_DIRECTION: PROGS
OS_CYLINDER: 0.00
OD_OVR_VA: 20/
OD_SPHERE: +1.25
OD_VPRISM_DIRECTION: PROGS
OD_OVR_VA: 20/
OS_OVR_VA: 20/
OD_CYLINDER: 0.00
OS_SPHERE: +1.00
OS_ADD: +2.75

## 2023-04-19 ASSESSMENT — AXIALLENGTH_DERIVED
OS_AL: 23.6072
OD_AL: 23.411
OS_AL: 24.362
OS_AL: 24.362
OD_AL: 23.411
OD_AL: 23.411

## 2023-04-19 ASSESSMENT — REFRACTION_MANIFEST
OS_SPHERE: +1.50
OS_VA1: 20/25
OD_CYLINDER: 0.00
OS_CYLINDER: -0.50
OD_SPHERE: +2.00
OD_AXIS: 000
OD_SPHERE: +2.00
OD_AXIS: 000
OS_VA1: 20/200
OS_SPHERE: -0.25
OS_AXIS: 107
OD_CYLINDER: 0.00
OS_CYLINDER: -0.25
OS_AXIS: 005
OD_VA1: 20/150
OD_VA1: 20/150

## 2023-04-19 ASSESSMENT — VISUAL ACUITY
OD_BCVA: 20/25
OS_BCVA: 20/150

## 2023-04-19 ASSESSMENT — KERATOMETRY
OS_K2POWER_DIOPTERS: 42.50
OS_K1POWER_DIOPTERS: 41.50
OD_K1POWER_DIOPTERS: 41.50
OS_AXISANGLE_DEGREES: 090
OD_AXISANGLE_DEGREES: 090
OD_K2POWER_DIOPTERS: 42.25
METHOD_AUTO_MANUAL: AUTO

## 2023-04-19 ASSESSMENT — REFRACTION_AUTOREFRACTION
OD_AXIS: 000
OD_CYLINDER: 0.00
OS_SPHERE: -0.25
OD_SPHERE: +2.00
OS_CYLINDER: -0.50
OS_AXIS: 005

## 2023-04-19 ASSESSMENT — CONFRONTATIONAL VISUAL FIELD TEST (CVF)
OS_FINDINGS: FULL
OD_FINDINGS: FULL

## 2023-04-19 ASSESSMENT — SUPERFICIAL PUNCTATE KERATITIS (SPK)
OS_SPK: 2+
OD_SPK: 2+

## 2023-04-19 ASSESSMENT — CORNEAL EDEMA CLINICAL DESCRIPTION: OS_CORNEALEDEMA: T

## 2023-04-21 ENCOUNTER — ASC (OUTPATIENT)
Dept: URBAN - METROPOLITAN AREA SURGERY 8 | Facility: SURGERY | Age: 58
Setting detail: OPHTHALMOLOGY
End: 2023-04-21
Payer: MEDICARE

## 2023-04-21 DIAGNOSIS — H25.11: ICD-10-CM

## 2023-04-21 PROCEDURE — 66984 XCAPSL CTRC RMVL W/O ECP: CPT | Performed by: OPHTHALMOLOGY

## 2023-04-22 ENCOUNTER — RX ONLY (RX ONLY)
Age: 58
End: 2023-04-22

## 2023-04-22 ENCOUNTER — OFFICE (OUTPATIENT)
Dept: URBAN - METROPOLITAN AREA CLINIC 116 | Facility: CLINIC | Age: 58
Setting detail: OPHTHALMOLOGY
End: 2023-04-22
Payer: MEDICARE

## 2023-04-22 DIAGNOSIS — Z96.1: ICD-10-CM

## 2023-04-22 PROCEDURE — 99024 POSTOP FOLLOW-UP VISIT: CPT | Performed by: OPTOMETRIST

## 2023-04-22 ASSESSMENT — REFRACTION_MANIFEST
OS_SPHERE: +1.50
OD_SPHERE: +2.00
OD_CYLINDER: 0.00
OD_AXIS: 000
OS_SPHERE: -0.25
OS_VA1: 20/200
OD_VA1: 20/150
OD_VA1: 20/150
OS_AXIS: 107
OS_VA1: 20/25
OD_AXIS: 000
OS_CYLINDER: -0.25
OS_CYLINDER: -0.50
OD_CYLINDER: 0.00
OD_SPHERE: +2.00
OS_AXIS: 005

## 2023-04-22 ASSESSMENT — KERATOMETRY
OS_K1POWER_DIOPTERS: 41.75
OS_K2POWER_DIOPTERS: 42.50
OD_K1POWER_DIOPTERS: 41.50
METHOD_AUTO_MANUAL: AUTO
OD_K2POWER_DIOPTERS: 42.25
OS_AXISANGLE_DEGREES: 090
OD_AXISANGLE_DEGREES: 095

## 2023-04-22 ASSESSMENT — REFRACTION_AUTOREFRACTION
OS_CYLINDER: -0.25
OD_SPHERE: +0.25
OD_CYLINDER: -0.75
OD_AXIS: 005
OS_SPHERE: 0.00
OS_AXIS: 005

## 2023-04-22 ASSESSMENT — REFRACTION_CURRENTRX
OS_AXIS: 000
OD_SPHERE: +1.25
OD_ADD: +3.00
OS_SPHERE: +1.00
OS_ADD: +3.00
OD_OVR_VA: 20/
OD_CYLINDER: 0.00
OD_CYLINDER: 0.00
OS_ADD: +2.75
OS_AXIS: 000
OS_CYLINDER: 0.00
OD_SPHERE: +1.25
OS_SPHERE: +1.00
OS_OVR_VA: 20/
OD_ADD: +2.50
OS_OVR_VA: 20/
OS_VPRISM_DIRECTION: SV
OD_VPRISM_DIRECTION: PROGS
OD_AXIS: 000
OS_CYLINDER: 0.00
OD_VPRISM_DIRECTION: SV
OS_VPRISM_DIRECTION: PROGS
OD_OVR_VA: 20/
OD_AXIS: 000

## 2023-04-22 ASSESSMENT — TONOMETRY
OS_IOP_MMHG: 20
OD_IOP_MMHG: 21

## 2023-04-22 ASSESSMENT — AXIALLENGTH_DERIVED
OS_AL: 24.159
OS_AL: 24.3132
OD_AL: 23.411
OD_AL: 23.411
OS_AL: 23.5613
OD_AL: 24.2556

## 2023-04-22 ASSESSMENT — CONFRONTATIONAL VISUAL FIELD TEST (CVF)
OS_FINDINGS: FULL
OD_FINDINGS: FULL

## 2023-04-22 ASSESSMENT — VISUAL ACUITY
OS_BCVA: 20/25-
OD_BCVA: 20/20

## 2023-04-22 ASSESSMENT — SPHEQUIV_DERIVED
OD_SPHEQUIV: 2
OS_SPHEQUIV: -0.5
OD_SPHEQUIV: 2
OS_SPHEQUIV: 1.375
OD_SPHEQUIV: -0.125
OS_SPHEQUIV: -0.125

## 2023-04-22 ASSESSMENT — CORNEAL EDEMA CLINICAL DESCRIPTION
OS_CORNEALEDEMA: T
OD_CORNEALEDEMA: 1+

## 2023-04-22 ASSESSMENT — LID EXAM ASSESSMENTS
OD_COMMENTS: TATTOO ON SUPERIOR & INFERIOR LIDS
OS_COMMENTS: TATTOO ON SUPERIOR & INFERIOR LIDS

## 2023-04-22 ASSESSMENT — SUPERFICIAL PUNCTATE KERATITIS (SPK)
OD_SPK: 2+
OS_SPK: 2+

## 2023-05-05 ENCOUNTER — APPOINTMENT (OUTPATIENT)
Dept: RHEUMATOLOGY | Facility: CLINIC | Age: 58
End: 2023-05-05

## 2023-05-09 ENCOUNTER — OFFICE (OUTPATIENT)
Dept: URBAN - METROPOLITAN AREA CLINIC 116 | Facility: CLINIC | Age: 58
Setting detail: OPHTHALMOLOGY
End: 2023-05-09
Payer: MEDICARE

## 2023-05-09 DIAGNOSIS — Z96.1: ICD-10-CM

## 2023-05-09 PROCEDURE — 99024 POSTOP FOLLOW-UP VISIT: CPT | Performed by: PHYSICIAN ASSISTANT

## 2023-05-09 ASSESSMENT — KERATOMETRY
OD_K1POWER_DIOPTERS: 41.50
OD_K2POWER_DIOPTERS: 42.25
METHOD_AUTO_MANUAL: AUTO
OS_K2POWER_DIOPTERS: 42.50
OS_K1POWER_DIOPTERS: 41.75
OD_AXISANGLE_DEGREES: 095
OS_AXISANGLE_DEGREES: 090

## 2023-05-09 ASSESSMENT — SUPERFICIAL PUNCTATE KERATITIS (SPK)
OD_SPK: 2+
OS_SPK: 2+

## 2023-05-09 ASSESSMENT — REFRACTION_MANIFEST
OS_CYLINDER: -0.25
OS_CYLINDER: -0.50
OD_AXIS: 000
OS_VA1: 20/200
OS_VA1: 20/25
OS_AXIS: 107
OD_AXIS: 000
OS_SPHERE: +1.50
OS_AXIS: 005
OD_SPHERE: +2.00
OD_CYLINDER: 0.00
OS_SPHERE: -0.25
OD_VA1: 20/150
OD_SPHERE: +2.00
OD_VA1: 20/150
OD_CYLINDER: 0.00

## 2023-05-09 ASSESSMENT — SPHEQUIV_DERIVED
OD_SPHEQUIV: 2
OS_SPHEQUIV: -0.125
OD_SPHEQUIV: 2
OS_SPHEQUIV: -0.5
OS_SPHEQUIV: 1.375
OD_SPHEQUIV: -0.125

## 2023-05-09 ASSESSMENT — VISUAL ACUITY
OS_BCVA: 20/25-
OD_BCVA: 20/25

## 2023-05-09 ASSESSMENT — REFRACTION_CURRENTRX
OD_ADD: +2.50
OS_VPRISM_DIRECTION: SV
OS_ADD: +2.75
OD_CYLINDER: 0.00
OD_AXIS: 000
OS_AXIS: 000
OS_OVR_VA: 20/
OD_ADD: +3.00
OD_OVR_VA: 20/
OD_VPRISM_DIRECTION: PROGS
OS_AXIS: 000
OD_OVR_VA: 20/
OS_SPHERE: +1.00
OD_AXIS: 000
OS_SPHERE: +1.00
OS_ADD: +3.00
OS_OVR_VA: 20/
OD_SPHERE: +1.25
OD_CYLINDER: 0.00
OS_CYLINDER: 0.00
OD_SPHERE: +1.25
OD_VPRISM_DIRECTION: SV
OS_VPRISM_DIRECTION: PROGS
OS_CYLINDER: 0.00

## 2023-05-09 ASSESSMENT — TONOMETRY
OS_IOP_MMHG: 20
OD_IOP_MMHG: 20

## 2023-05-09 ASSESSMENT — REFRACTION_AUTOREFRACTION
OS_AXIS: 005
OD_SPHERE: +0.25
OS_SPHERE: 0.00
OS_CYLINDER: -0.25
OD_AXIS: 005
OD_CYLINDER: -0.75

## 2023-05-09 ASSESSMENT — CONFRONTATIONAL VISUAL FIELD TEST (CVF)
OS_FINDINGS: FULL
OD_FINDINGS: FULL

## 2023-05-09 ASSESSMENT — AXIALLENGTH_DERIVED
OD_AL: 23.411
OD_AL: 24.2556
OS_AL: 24.3132
OS_AL: 24.159
OS_AL: 23.5613
OD_AL: 23.411

## 2023-05-09 ASSESSMENT — LID EXAM ASSESSMENTS
OD_COMMENTS: TATTOO ON SUPERIOR & INFERIOR LIDS
OS_COMMENTS: TATTOO ON SUPERIOR & INFERIOR LIDS

## 2023-06-08 ENCOUNTER — APPOINTMENT (OUTPATIENT)
Dept: RHEUMATOLOGY | Facility: CLINIC | Age: 58
End: 2023-06-08
Payer: MEDICARE

## 2023-06-08 VITALS
OXYGEN SATURATION: 94 % | DIASTOLIC BLOOD PRESSURE: 80 MMHG | SYSTOLIC BLOOD PRESSURE: 130 MMHG | BODY MASS INDEX: 28.27 KG/M2 | WEIGHT: 144 LBS | HEIGHT: 60 IN | HEART RATE: 83 BPM

## 2023-06-08 PROCEDURE — 36415 COLL VENOUS BLD VENIPUNCTURE: CPT

## 2023-06-08 PROCEDURE — 99214 OFFICE O/P EST MOD 30 MIN: CPT | Mod: 25

## 2023-06-08 RX ORDER — CALCIUM CARBONATE 500(1250)
500 TABLET ORAL
Qty: 60 | Refills: 11 | Status: ACTIVE | COMMUNITY
Start: 2023-06-08 | End: 1900-01-01

## 2023-06-09 LAB
25(OH)D3 SERPL-MCNC: 22.9 NG/ML
ALBUMIN SERPL ELPH-MCNC: 4.4 G/DL
ALP BLD-CCNC: 118 U/L
ALT SERPL-CCNC: 25 U/L
ANION GAP SERPL CALC-SCNC: 13 MMOL/L
AST SERPL-CCNC: 26 U/L
BILIRUB SERPL-MCNC: 0.3 MG/DL
BUN SERPL-MCNC: 13 MG/DL
CALCIUM SERPL-MCNC: 10.1 MG/DL
CHLORIDE SERPL-SCNC: 105 MMOL/L
CO2 SERPL-SCNC: 22 MMOL/L
CREAT SERPL-MCNC: 0.88 MG/DL
CRP SERPL-MCNC: <3 MG/L
EGFR: 75 ML/MIN/1.73M2
ERYTHROCYTE [SEDIMENTATION RATE] IN BLOOD BY WESTERGREN METHOD: 29 MM/HR
GLUCOSE SERPL-MCNC: 104 MG/DL
POTASSIUM SERPL-SCNC: 4.6 MMOL/L
PROT SERPL-MCNC: 7.3 G/DL
SODIUM SERPL-SCNC: 141 MMOL/L

## 2023-07-25 ENCOUNTER — OFFICE (OUTPATIENT)
Dept: URBAN - METROPOLITAN AREA CLINIC 116 | Facility: CLINIC | Age: 58
Setting detail: OPHTHALMOLOGY
End: 2023-07-25
Payer: MEDICARE

## 2023-07-25 DIAGNOSIS — H16.223: ICD-10-CM

## 2023-07-25 DIAGNOSIS — Z96.1: ICD-10-CM

## 2023-07-25 DIAGNOSIS — H26.493: ICD-10-CM

## 2023-07-25 PROCEDURE — 99213 OFFICE O/P EST LOW 20 MIN: CPT | Performed by: PHYSICIAN ASSISTANT

## 2023-07-25 ASSESSMENT — REFRACTION_MANIFEST
OD_VA1: 20/150
OS_SPHERE: -0.25
OD_SPHERE: +2.00
OS_AXIS: 107
OS_VA1: 20/25
OD_CYLINDER: 0.00
OS_VA1: 20/200
OS_AXIS: 005
OD_VA1: 20/150
OS_CYLINDER: -0.50
OS_CYLINDER: -0.25
OD_AXIS: 000
OD_SPHERE: +2.00
OS_SPHERE: +1.50
OD_CYLINDER: 0.00
OD_AXIS: 000

## 2023-07-25 ASSESSMENT — REFRACTION_AUTOREFRACTION
OD_CYLINDER: -0.75
OD_AXIS: 005
OS_SPHERE: 0.00
OS_CYLINDER: -0.25
OS_AXIS: 005
OD_SPHERE: +0.25

## 2023-07-25 ASSESSMENT — VISUAL ACUITY
OS_BCVA: 20/20-
OD_BCVA: 20/20-

## 2023-07-25 ASSESSMENT — REFRACTION_CURRENTRX
OS_AXIS: 000
OS_SPHERE: +1.00
OS_AXIS: 000
OD_AXIS: 000
OD_OVR_VA: 20/
OS_SPHERE: +1.00
OS_OVR_VA: 20/
OD_ADD: +3.00
OS_OVR_VA: 20/
OD_AXIS: 000
OD_SPHERE: +1.25
OD_OVR_VA: 20/
OD_ADD: +2.50
OS_CYLINDER: 0.00
OD_CYLINDER: 0.00
OD_CYLINDER: 0.00
OS_ADD: +3.00
OS_CYLINDER: 0.00
OS_VPRISM_DIRECTION: PROGS
OD_SPHERE: +1.25
OS_ADD: +2.75
OS_VPRISM_DIRECTION: SV
OD_VPRISM_DIRECTION: SV
OD_VPRISM_DIRECTION: PROGS

## 2023-07-25 ASSESSMENT — KERATOMETRY
OD_AXISANGLE_DEGREES: 095
OD_K2POWER_DIOPTERS: 42.25
OS_AXISANGLE_DEGREES: 090
METHOD_AUTO_MANUAL: AUTO
OS_K1POWER_DIOPTERS: 41.75
OD_K1POWER_DIOPTERS: 41.50
OS_K2POWER_DIOPTERS: 42.50

## 2023-07-25 ASSESSMENT — CONFRONTATIONAL VISUAL FIELD TEST (CVF)
OS_FINDINGS: FULL
OD_FINDINGS: FULL

## 2023-07-25 ASSESSMENT — AXIALLENGTH_DERIVED
OS_AL: 23.5613
OS_AL: 24.159
OD_AL: 23.411
OS_AL: 24.3132
OD_AL: 23.411
OD_AL: 24.2556

## 2023-07-25 ASSESSMENT — SPHEQUIV_DERIVED
OD_SPHEQUIV: -0.125
OD_SPHEQUIV: 2
OS_SPHEQUIV: -0.5
OS_SPHEQUIV: 1.375
OD_SPHEQUIV: 2
OS_SPHEQUIV: -0.125

## 2023-07-25 ASSESSMENT — LID EXAM ASSESSMENTS
OD_COMMENTS: TATTOO ON SUPERIOR & INFERIOR LIDS
OS_COMMENTS: TATTOO ON SUPERIOR & INFERIOR LIDS

## 2023-07-25 ASSESSMENT — SUPERFICIAL PUNCTATE KERATITIS (SPK)
OS_SPK: 2+
OD_SPK: 2+

## 2023-07-25 ASSESSMENT — TONOMETRY
OS_IOP_MMHG: 19
OD_IOP_MMHG: 19

## 2023-08-17 ENCOUNTER — APPOINTMENT (OUTPATIENT)
Dept: PULMONOLOGY | Facility: CLINIC | Age: 58
End: 2023-08-17
Payer: MEDICARE

## 2023-08-17 VITALS
OXYGEN SATURATION: 97 % | BODY MASS INDEX: 28.27 KG/M2 | DIASTOLIC BLOOD PRESSURE: 70 MMHG | WEIGHT: 144 LBS | SYSTOLIC BLOOD PRESSURE: 122 MMHG | HEART RATE: 88 BPM | HEIGHT: 60 IN

## 2023-08-17 PROCEDURE — 99214 OFFICE O/P EST MOD 30 MIN: CPT

## 2023-08-17 NOTE — RESULTS/DATA
[TextEntry] : [place] EXAM: CT CHEST   PROCEDURE DATE: 12/14/2021    INTERPRETATION: INDICATION: Follow-up groundglass  TECHNIQUE: Volumetric images of the chest without intravenous contrast. Maximum intensity projection images were generated.  COMPARISON: CT chest 8/23/2021.  FINDINGS:  LUNGS/AIRWAYS/PLEURA: Patent airways to the segmental bronchi. Allowing for technical differences, no definite change in mosaic perfusion and superimposed groundglass, the latter mostly in the anterior upper lobes. Stable mild bronchial dilatation and mucoid impaction in the lingula. New tree-in-bud opacities in the left upper lobe and left lower lobe. Unremarkable pleura.  LYMPH NODES/MEDIASTINUM: Unremarkable thyroid. No enlarged lymph nodes.  HEART/VASCULATURE: Normal heart size. Unremarkable pericardium. Normal caliber aorta and main pulmonary artery.  UPPER ABDOMEN:Mild partially included fat stranding in the left upper quadrant. Punctate right renal calculus.  BONES/SOFT TISSUES: Degenerative changes of the spine.   IMPRESSION:  Since 8/3/2021:  New tree-in-bud in the left upper lobe and left lower lobe, consistent with impacted bronchioles, consider infectious bronchiolitis in the appropriate clinical setting.  Stable airways disease with mosaic perfusion and mucous plugging within the lingula.  Stable superimposed groundglass, predominantly affecting the upper lobes, which in the setting of possible lupus, may reflect pulmonary hemorrhage.  Partially included mild nonspecific fat stranding in the left upper quadrant.    --- End of Report ---       KALPESH BHATIA M.D., ATTENDING RADIOGIST This document has been electronically signed. Dec 16 2021 5:11PM  ~~~~~~~~~~~~~~~~~~~~~~~~~~~~~~~~~~~~~~~~~~~~~~~~~~~~~~~~~~~~~~~~~~~~~~~~

## 2023-08-17 NOTE — ASSESSMENT
Medical Weight Loss Multi-Disciplinary Program    Name: Thalia San   : 1961    Session# 2  Date: 2017     Height: 5' 6\" (167.6 cm)    Weight: 118.8 kg (262 lb) lbs. Body mass index is 42.29 kg/(m^2). Pounds Gained: 3    Dietary Instructions    Reviewed intake  Understanding label reading  Understanding low carbohydrates, low sugar, higher protein meals  Understanding proper portions  Dining outside home  Instruction given for personal dietary changes  Discussed perceived compliance  Comments: Pt given brief pre/post-op diet ed and diet hx reviewed. Physical Activity/Exercise    Discussed Perceived Compliance  Reasonable Goals Set  Motivation  Comments: walking 5 days per week for 45-60 minutes; has been doing this for 3 months     Behavior Modification    Positive attitude  Comments: Pt is working on the following goals:    Goals:   1. Eat within 2 hours of waking up; can use a protein shake as a meal replacement or snack  2. Eat 3 balanced meals per day (no skipping meals)  3. Decrease your high sugar/high carbohydrate snacks and replace with high protein snacks     Candidate for surgery (per RD): Pending     Dietitian: Claudia Ovalles is a 60 y.o. female who present for a pre-op evaluation.     Visit Vitals    Ht 5' 6\" (1.676 m)    Wt 118.8 kg (262 lb)    BMI 42.29 kg/m2     Past Medical History:   Diagnosis Date    Adverse effect of anesthesia     small airway, difficult intubation, pt requesting anesthesia consult    Asthma     seasonal    Carpal tunnel syndrome     Chronic pain     lower back, arms and legs    Depression     GERD (gastroesophageal reflux disease)     H/O seasonal allergies     Hypertension     Morbid obesity ([address]     Multiple sclerosis ([address]     Osteoarthritis of left knee 3/24/2016    Osteoarthritis of left knee 3/24/2016    Overactive bladder     Rheumatoid arthritis ([address]     Thyroid cancer ([address] [FreeTextEntry1] : 58F PMH asymptomatic COVID (09/2021), former smoker, Sjogren's, ILD on MMF and Prednisone 5mg Q48H who presents for follow up visit.   - Decrease Prednisone from 5mg Q48H to 2mg QDaily - C/w Breo ellipta 200, refills sent - C/w PRN albuterol - Repeat PFT with f/u visit - F/u with rheumatology  The patient expressed understanding and agreement with the plan as outlined above, and accepts that it is their responsibility to be compliant with any recommended testing, treatment, and follow-up visits. All relevant questions and concerns were addressed.  30 minutes of time were spent on the encounter. Medical records were reviewed, including but not limited to hospital records, outpatient records, laboratory data, and diagnostic imaging studies. Greater than 50% of the face-to-face encounter time was spent on counseling and/or coordination of care.  Yakov Sherman M.D. Pulmonary & Critical Care Medicine [facility] [place] [address] [facility] [place], [place] [address] T: [phone] F: [phone] Procedure:  laparoscopic sleeve gastrectomy     Reasons for Surgery:  BMI > 40 with one or more medically significant comorbidities    Summary:  Pt given brief pre/post-op diet ed and diet hx reviewed. Pt set several goals. See below. Current Vitamins: MVI with iron     Patient Education and Materials Provided:  Supplement Resource Guide, B Vitamin Information, MVI Recommendations, Calcium Citrate Information, Bariatric Supplement Companies, Protein Supplement Information, Fluid Requirements, No Caffeine or Carbonation, No Alcohol for One Year Post Op, 3 Balanced Meals a Day, Food Group Guide, Good Choices Dining Out, No Snacks, No Concentrated Sweets, Support System at Home, Exercising, Support Group Information and Addressed Current Habits / Changes to make    Nutritional Hx: What is the number of meals you eat per day? 2  Comment: typically skips breakfast     Do you eat between meals / snack? yes  Typical snack: typically snacks on candy, cookies    How fast do you eat your meals? slow    How many sodas/sugared beverages do you drink per day? 1    How many caffeinated drinks do you have per day? Twice a week coffee     How much water do you drink per day? 48 ounces/day  (8oz glasses)    How often do you eat fast food? 1 times a week    How often do you consume alcohol? occasionally; varies     Diet History:  Breakfast  What are you eating and how much? Skip    When? ii   Where? iii   Snacks  What are you eating and how much? Candy, cookies    When? ii   Where? iii   Hydration  What are you eating and how much? water   When? ii   Where? ii   Lunch  What are you eating and how much? Cheese and crackers; salad with protein    When? ii   Where? iii   Snacks  What are you eating and how much? Cookies, candy    When? ii   Where? iii   Hydration  What are you eating and how much? water   When? ii   Where? iii   Dinner  What are you eating and how much?  Pork chops (fried) and rice   When? ii   Where? iii Snacks  What are you eating and how much? Popsicle, ice cream, candy    When? ii   Where? iii   Hydration  What are you eating and how much? water   When? ii   Where? iii     Exercise:  Do you currently have an exercise routine? yes  What kind of exercise do you do? walking . For how long? 45-60 minutes For how often? 5 times per week    Goals:   1. Eat within 2 hours of waking up; can use a protein shake as a meal replacement or snack  2. Eat 3 balanced meals per day (no skipping meals)  3.  Decrease your high sugar/high carbohydrate snacks and replace with high protein snacks

## 2023-08-17 NOTE — HISTORY OF PRESENT ILLNESS
[Former] : former [TextBox_4] : 58F PMH asymptomatic COVID (09/2021), former smoker, Sjogren's, ILD on MMF and Prednisone 5mg Q48H who presents for follow up visit. She denies worsening symptoms. She is using Breo ellipta 200 every day. Rarely uses albuterol. She has occasional dry cough, worse at night. No N/V/D.   [employer]  #[id number] [TextBox_11] : 0.1 [TextBox_13] : 4 [YearQuit] : 1991

## 2023-08-17 NOTE — PHYSICAL EXAM
[TextEntry] : Gen - In NAD, communicating easily and in full sentences HEENT - NC/AT CV - +S1, S2 Resp - B/L faint crackles, good inspiratory effort, non-labored breathing Ext - No pedal edema Skin - No exposed rashes or lesions Neuro - Moves all four extremities Psych - Normal affect

## 2023-09-06 ENCOUNTER — APPOINTMENT (OUTPATIENT)
Dept: CT IMAGING | Facility: CLINIC | Age: 58
End: 2023-09-06

## 2023-09-06 ENCOUNTER — OUTPATIENT (OUTPATIENT)
Dept: OUTPATIENT SERVICES | Facility: HOSPITAL | Age: 58
LOS: 1 days | End: 2023-09-06

## 2023-09-06 DIAGNOSIS — Z98.82 BREAST IMPLANT STATUS: Chronic | ICD-10-CM

## 2023-09-06 DIAGNOSIS — Z00.8 ENCOUNTER FOR OTHER GENERAL EXAMINATION: ICD-10-CM

## 2023-09-06 DIAGNOSIS — Z98.890 OTHER SPECIFIED POSTPROCEDURAL STATES: Chronic | ICD-10-CM

## 2023-09-06 DIAGNOSIS — Z90.710 ACQUIRED ABSENCE OF BOTH CERVIX AND UTERUS: Chronic | ICD-10-CM

## 2023-10-01 PROBLEM — M54.16 LUMBAR RADICULAR PAIN: Status: ACTIVE | Noted: 2021-08-24

## 2023-10-09 ENCOUNTER — APPOINTMENT (OUTPATIENT)
Dept: RHEUMATOLOGY | Facility: CLINIC | Age: 58
End: 2023-10-09
Payer: MEDICARE

## 2023-10-09 VITALS
OXYGEN SATURATION: 93 % | BODY MASS INDEX: 27.48 KG/M2 | DIASTOLIC BLOOD PRESSURE: 77 MMHG | SYSTOLIC BLOOD PRESSURE: 135 MMHG | HEART RATE: 73 BPM | WEIGHT: 140 LBS | HEIGHT: 60 IN | TEMPERATURE: 97.4 F

## 2023-10-09 DIAGNOSIS — M85.80 OTHER SPECIFIED DISORDERS OF BONE DENSITY AND STRUCTURE, UNSPECIFIED SITE: ICD-10-CM

## 2023-10-09 DIAGNOSIS — M54.16 RADICULOPATHY, LUMBAR REGION: ICD-10-CM

## 2023-10-09 DIAGNOSIS — Z78.0 OTHER SPECIFIED DISORDERS OF BONE DENSITY AND STRUCTURE, UNSPECIFIED SITE: ICD-10-CM

## 2023-10-09 PROCEDURE — 99215 OFFICE O/P EST HI 40 MIN: CPT | Mod: 25

## 2023-10-09 PROCEDURE — 36415 COLL VENOUS BLD VENIPUNCTURE: CPT

## 2023-10-10 LAB
ALBUMIN MFR SERPL ELPH: 55.7 %
ALBUMIN SERPL ELPH-MCNC: 4.4 G/DL
ALBUMIN SERPL-MCNC: 4.1 G/DL
ALBUMIN/GLOB SERPL: 1.2 RATIO
ALP BLD-CCNC: 116 U/L
ALPHA1 GLOB MFR SERPL ELPH: 4 %
ALPHA1 GLOB SERPL ELPH-MCNC: 0.3 G/DL
ALPHA2 GLOB MFR SERPL ELPH: 9.9 %
ALPHA2 GLOB SERPL ELPH-MCNC: 0.7 G/DL
ALT SERPL-CCNC: 27 U/L
ANION GAP SERPL CALC-SCNC: 11 MMOL/L
AST SERPL-CCNC: 31 U/L
B-GLOBULIN MFR SERPL ELPH: 11 %
B-GLOBULIN SERPL ELPH-MCNC: 0.8 G/DL
BASOPHILS # BLD AUTO: 0.06 K/UL
BASOPHILS NFR BLD AUTO: 0.9 %
BILIRUB SERPL-MCNC: 0.3 MG/DL
BUN SERPL-MCNC: 13 MG/DL
CALCIUM SERPL-MCNC: 10.1 MG/DL
CHLORIDE SERPL-SCNC: 103 MMOL/L
CO2 SERPL-SCNC: 25 MMOL/L
CREAT SERPL-MCNC: 0.74 MG/DL
CRP SERPL-MCNC: 7 MG/L
DEPRECATED KAPPA LC FREE/LAMBDA SER: 1.31 RATIO
EGFR: 93 ML/MIN/1.73M2
EOSINOPHIL # BLD AUTO: 0.33 K/UL
EOSINOPHIL NFR BLD AUTO: 4.8 %
ERYTHROCYTE [SEDIMENTATION RATE] IN BLOOD BY WESTERGREN METHOD: 15 MM/HR
GAMMA GLOB FLD ELPH-MCNC: 1.4 G/DL
GAMMA GLOB MFR SERPL ELPH: 19.4 %
GLUCOSE SERPL-MCNC: 88 MG/DL
HCT VFR BLD CALC: 44.3 %
HGB BLD-MCNC: 14.4 G/DL
IGA SER QL IEP: 257 MG/DL
IGG SER QL IEP: 1400 MG/DL
IGM SER QL IEP: 160 MG/DL
IMM GRANULOCYTES NFR BLD AUTO: 0.4 %
INTERPRETATION SERPL IEP-IMP: NORMAL
KAPPA LC CSF-MCNC: 2.4 MG/DL
KAPPA LC SERPL-MCNC: 3.15 MG/DL
LDH SERPL-CCNC: 273 U/L
LYMPHOCYTES # BLD AUTO: 2.49 K/UL
LYMPHOCYTES NFR BLD AUTO: 36 %
M PROTEIN MFR SERPL ELPH: NORMAL
M PROTEIN SPEC IFE-MCNC: NORMAL
MAN DIFF?: NORMAL
MCHC RBC-ENTMCNC: 30.6 PG
MCHC RBC-ENTMCNC: 32.5 GM/DL
MCV RBC AUTO: 94.3 FL
MONOCLON BAND OBS SERPL: NORMAL
MONOCYTES # BLD AUTO: 0.4 K/UL
MONOCYTES NFR BLD AUTO: 5.8 %
NEUTROPHILS # BLD AUTO: 3.61 K/UL
NEUTROPHILS NFR BLD AUTO: 52.1 %
PLATELET # BLD AUTO: 292 K/UL
POTASSIUM SERPL-SCNC: 5 MMOL/L
PROT SERPL-MCNC: 7.4 G/DL
RBC # BLD: 4.7 M/UL
RBC # FLD: 13.2 %
RHEUMATOID FACT SER QL: 52 IU/ML
SODIUM SERPL-SCNC: 140 MMOL/L
WBC # FLD AUTO: 6.92 K/UL

## 2023-11-09 ENCOUNTER — APPOINTMENT (OUTPATIENT)
Dept: PULMONOLOGY | Facility: CLINIC | Age: 58
End: 2023-11-09
Payer: MEDICARE

## 2023-11-09 VITALS
SYSTOLIC BLOOD PRESSURE: 130 MMHG | HEART RATE: 78 BPM | RESPIRATION RATE: 16 BRPM | DIASTOLIC BLOOD PRESSURE: 76 MMHG | OXYGEN SATURATION: 93 %

## 2023-11-09 VITALS — BODY MASS INDEX: 26.2 KG/M2 | WEIGHT: 137 LBS | HEIGHT: 60.5 IN

## 2023-11-09 DIAGNOSIS — J98.01 ACUTE BRONCHOSPASM: ICD-10-CM

## 2023-11-09 PROCEDURE — 94010 BREATHING CAPACITY TEST: CPT

## 2023-11-09 PROCEDURE — 94727 GAS DIL/WSHOT DETER LNG VOL: CPT

## 2023-11-09 PROCEDURE — 85018 HEMOGLOBIN: CPT | Mod: QW

## 2023-11-09 PROCEDURE — 99214 OFFICE O/P EST MOD 30 MIN: CPT | Mod: 25

## 2023-11-09 PROCEDURE — 94729 DIFFUSING CAPACITY: CPT

## 2023-11-12 ENCOUNTER — EMERGENCY (EMERGENCY)
Facility: HOSPITAL | Age: 58
LOS: 1 days | Discharge: HOME | End: 2023-11-12
Attending: EMERGENCY MEDICINE
Payer: COMMERCIAL

## 2023-11-12 VITALS
TEMPERATURE: 98 F | RESPIRATION RATE: 20 BRPM | WEIGHT: 141.32 LBS | OXYGEN SATURATION: 97 % | HEIGHT: 62 IN | HEART RATE: 73 BPM | DIASTOLIC BLOOD PRESSURE: 92 MMHG | SYSTOLIC BLOOD PRESSURE: 158 MMHG

## 2023-11-12 VITALS
RESPIRATION RATE: 16 BRPM | TEMPERATURE: 98 F | HEART RATE: 64 BPM | DIASTOLIC BLOOD PRESSURE: 72 MMHG | OXYGEN SATURATION: 97 % | SYSTOLIC BLOOD PRESSURE: 118 MMHG

## 2023-11-12 DIAGNOSIS — Z98.890 OTHER SPECIFIED POSTPROCEDURAL STATES: Chronic | ICD-10-CM

## 2023-11-12 DIAGNOSIS — Z90.710 ACQUIRED ABSENCE OF BOTH CERVIX AND UTERUS: Chronic | ICD-10-CM

## 2023-11-12 DIAGNOSIS — Z98.82 BREAST IMPLANT STATUS: Chronic | ICD-10-CM

## 2023-11-12 LAB
ALBUMIN SERPL ELPH-MCNC: 4.3 G/DL — SIGNIFICANT CHANGE UP (ref 3.3–5.2)
ALBUMIN SERPL ELPH-MCNC: 4.3 G/DL — SIGNIFICANT CHANGE UP (ref 3.3–5.2)
ALP SERPL-CCNC: 120 U/L — SIGNIFICANT CHANGE UP (ref 40–120)
ALP SERPL-CCNC: 120 U/L — SIGNIFICANT CHANGE UP (ref 40–120)
ALT FLD-CCNC: 27 U/L — SIGNIFICANT CHANGE UP
ALT FLD-CCNC: 27 U/L — SIGNIFICANT CHANGE UP
ANION GAP SERPL CALC-SCNC: 10 MMOL/L — SIGNIFICANT CHANGE UP (ref 5–17)
ANION GAP SERPL CALC-SCNC: 10 MMOL/L — SIGNIFICANT CHANGE UP (ref 5–17)
AST SERPL-CCNC: 26 U/L — SIGNIFICANT CHANGE UP
AST SERPL-CCNC: 26 U/L — SIGNIFICANT CHANGE UP
BILIRUB SERPL-MCNC: 0.2 MG/DL — LOW (ref 0.4–2)
BILIRUB SERPL-MCNC: 0.2 MG/DL — LOW (ref 0.4–2)
BUN SERPL-MCNC: 16.4 MG/DL — SIGNIFICANT CHANGE UP (ref 8–20)
BUN SERPL-MCNC: 16.4 MG/DL — SIGNIFICANT CHANGE UP (ref 8–20)
CALCIUM SERPL-MCNC: 9.1 MG/DL — SIGNIFICANT CHANGE UP (ref 8.4–10.5)
CALCIUM SERPL-MCNC: 9.1 MG/DL — SIGNIFICANT CHANGE UP (ref 8.4–10.5)
CHLORIDE SERPL-SCNC: 103 MMOL/L — SIGNIFICANT CHANGE UP (ref 96–108)
CHLORIDE SERPL-SCNC: 103 MMOL/L — SIGNIFICANT CHANGE UP (ref 96–108)
CO2 SERPL-SCNC: 25 MMOL/L — SIGNIFICANT CHANGE UP (ref 22–29)
CO2 SERPL-SCNC: 25 MMOL/L — SIGNIFICANT CHANGE UP (ref 22–29)
CREAT SERPL-MCNC: 0.71 MG/DL — SIGNIFICANT CHANGE UP (ref 0.5–1.3)
CREAT SERPL-MCNC: 0.71 MG/DL — SIGNIFICANT CHANGE UP (ref 0.5–1.3)
EGFR: 97 ML/MIN/1.73M2 — SIGNIFICANT CHANGE UP
EGFR: 97 ML/MIN/1.73M2 — SIGNIFICANT CHANGE UP
GLUCOSE SERPL-MCNC: 93 MG/DL — SIGNIFICANT CHANGE UP (ref 70–99)
GLUCOSE SERPL-MCNC: 93 MG/DL — SIGNIFICANT CHANGE UP (ref 70–99)
HCT VFR BLD CALC: 43.5 % — SIGNIFICANT CHANGE UP (ref 34.5–45)
HCT VFR BLD CALC: 43.5 % — SIGNIFICANT CHANGE UP (ref 34.5–45)
HGB BLD-MCNC: 14.8 G/DL — SIGNIFICANT CHANGE UP (ref 11.5–15.5)
HGB BLD-MCNC: 14.8 G/DL — SIGNIFICANT CHANGE UP (ref 11.5–15.5)
MCHC RBC-ENTMCNC: 30.6 PG — SIGNIFICANT CHANGE UP (ref 27–34)
MCHC RBC-ENTMCNC: 30.6 PG — SIGNIFICANT CHANGE UP (ref 27–34)
MCHC RBC-ENTMCNC: 34 GM/DL — SIGNIFICANT CHANGE UP (ref 32–36)
MCHC RBC-ENTMCNC: 34 GM/DL — SIGNIFICANT CHANGE UP (ref 32–36)
MCV RBC AUTO: 89.9 FL — SIGNIFICANT CHANGE UP (ref 80–100)
MCV RBC AUTO: 89.9 FL — SIGNIFICANT CHANGE UP (ref 80–100)
PLATELET # BLD AUTO: 255 K/UL — SIGNIFICANT CHANGE UP (ref 150–400)
PLATELET # BLD AUTO: 255 K/UL — SIGNIFICANT CHANGE UP (ref 150–400)
POTASSIUM SERPL-MCNC: 3.7 MMOL/L — SIGNIFICANT CHANGE UP (ref 3.5–5.3)
POTASSIUM SERPL-MCNC: 3.7 MMOL/L — SIGNIFICANT CHANGE UP (ref 3.5–5.3)
POTASSIUM SERPL-SCNC: 3.7 MMOL/L — SIGNIFICANT CHANGE UP (ref 3.5–5.3)
POTASSIUM SERPL-SCNC: 3.7 MMOL/L — SIGNIFICANT CHANGE UP (ref 3.5–5.3)
PROT SERPL-MCNC: 7.6 G/DL — SIGNIFICANT CHANGE UP (ref 6.6–8.7)
PROT SERPL-MCNC: 7.6 G/DL — SIGNIFICANT CHANGE UP (ref 6.6–8.7)
RBC # BLD: 4.84 M/UL — SIGNIFICANT CHANGE UP (ref 3.8–5.2)
RBC # BLD: 4.84 M/UL — SIGNIFICANT CHANGE UP (ref 3.8–5.2)
RBC # FLD: 12.7 % — SIGNIFICANT CHANGE UP (ref 10.3–14.5)
RBC # FLD: 12.7 % — SIGNIFICANT CHANGE UP (ref 10.3–14.5)
SODIUM SERPL-SCNC: 138 MMOL/L — SIGNIFICANT CHANGE UP (ref 135–145)
SODIUM SERPL-SCNC: 138 MMOL/L — SIGNIFICANT CHANGE UP (ref 135–145)
WBC # BLD: 7.67 K/UL — SIGNIFICANT CHANGE UP (ref 3.8–10.5)
WBC # BLD: 7.67 K/UL — SIGNIFICANT CHANGE UP (ref 3.8–10.5)
WBC # FLD AUTO: 7.67 K/UL — SIGNIFICANT CHANGE UP (ref 3.8–10.5)
WBC # FLD AUTO: 7.67 K/UL — SIGNIFICANT CHANGE UP (ref 3.8–10.5)

## 2023-11-12 PROCEDURE — 36415 COLL VENOUS BLD VENIPUNCTURE: CPT

## 2023-11-12 PROCEDURE — 82962 GLUCOSE BLOOD TEST: CPT

## 2023-11-12 PROCEDURE — 96374 THER/PROPH/DIAG INJ IV PUSH: CPT

## 2023-11-12 PROCEDURE — 99284 EMERGENCY DEPT VISIT MOD MDM: CPT | Mod: 25

## 2023-11-12 PROCEDURE — 99284 EMERGENCY DEPT VISIT MOD MDM: CPT

## 2023-11-12 PROCEDURE — 85027 COMPLETE CBC AUTOMATED: CPT

## 2023-11-12 PROCEDURE — 70450 CT HEAD/BRAIN W/O DYE: CPT | Mod: 26,MA

## 2023-11-12 PROCEDURE — 70450 CT HEAD/BRAIN W/O DYE: CPT | Mod: MA

## 2023-11-12 PROCEDURE — 80053 COMPREHEN METABOLIC PANEL: CPT

## 2023-11-12 RX ORDER — METOCLOPRAMIDE HCL 10 MG
10 TABLET ORAL ONCE
Refills: 0 | Status: COMPLETED | OUTPATIENT
Start: 2023-11-12 | End: 2023-11-12

## 2023-11-12 RX ORDER — MECLIZINE HCL 12.5 MG
2 TABLET ORAL
Qty: 18 | Refills: 0
Start: 2023-11-12 | End: 2023-11-14

## 2023-11-12 RX ORDER — SODIUM CHLORIDE 9 MG/ML
1000 INJECTION, SOLUTION INTRAVENOUS ONCE
Refills: 0 | Status: COMPLETED | OUTPATIENT
Start: 2023-11-12 | End: 2023-11-12

## 2023-11-12 RX ORDER — METOCLOPRAMIDE HCL 10 MG
10 TABLET ORAL ONCE
Refills: 0 | Status: DISCONTINUED | OUTPATIENT
Start: 2023-11-12 | End: 2023-11-12

## 2023-11-12 RX ORDER — MECLIZINE HCL 12.5 MG
50 TABLET ORAL ONCE
Refills: 0 | Status: COMPLETED | OUTPATIENT
Start: 2023-11-12 | End: 2023-11-12

## 2023-11-12 RX ADMIN — SODIUM CHLORIDE 1000 MILLILITER(S): 9 INJECTION, SOLUTION INTRAVENOUS at 21:40

## 2023-11-12 RX ADMIN — Medication 10 MILLIGRAM(S): at 21:39

## 2023-11-12 RX ADMIN — Medication 50 MILLIGRAM(S): at 21:39

## 2023-11-12 NOTE — ED PROVIDER NOTE - OBJECTIVE STATEMENT
58-year-old female past medical history including hypertension presenting for headache.  Headache started suddenly about 40 minutes ago.  Reports she has had similar headaches but this was much worse.  Stating that it is on the left side  and started suddenly.  He took 500 Tylenol half an hour ago but still having headache.  Reports feeling slightly lightheaded and dizzy with a headache.  Denying any double vision, changes in strength or sensation, nausea, vomiting, difficulty speaking, fever, chills, chest pain, shortness of breath, abdominal pain, dysuria.  Dizziness is worse upon moving head but no difficulty walking.

## 2023-11-12 NOTE — ED PROVIDER NOTE - CARE PROVIDER_API CALL
Zacarias Bonner  Neurology  [address] [place] [address], [facility] [address]  [place] [address]  Phone: [phone]  Fax: [phone]  Follow Up Time:

## 2023-11-12 NOTE — ED ADULT TRIAGE NOTE - CHIEF COMPLAINT QUOTE
Patient presents to ED with c/o headache and loss of balance 1/2 hour PTA.  Denies trauma/injury.  Last Tylenol at 2020.  BS 85 in triage.

## 2023-11-12 NOTE — ED PROVIDER NOTE - CLINICAL SUMMARY MEDICAL DECISION MAKING FREE TEXT BOX
58-year-old female past medical history including hypertension presenting for headache.   CT head  negative for hemorrhage.    No neurologic signs on physical exam.  Patient was given headache cocktail and symptoms greatly improved.  Told to follow-up with neurologist and given meclizine for home.

## 2023-11-12 NOTE — ED ADULT NURSE NOTE - NSFALLUNIVINTERV_ED_ALL_ED
Attempted to reach patient regarding his appointment on 7/27/21. Unable to contact at this time. Letter mailed to reschedule. Bed/Stretcher in lowest position, wheels locked, appropriate side rails in place/Call bell, personal items and telephone in reach/Instruct patient to call for assistance before getting out of bed/chair/stretcher/Non-slip footwear applied when patient is off stretcher/[place] to call system/Physically safe environment - no spills, clutter or unnecessary equipment/Purposeful proactive rounding/Room/bathroom lighting operational, light cord in reach

## 2023-11-12 NOTE — ED PROVIDER NOTE - ATTENDING CONTRIBUTION TO CARE
I, Darion Pena, performed a face to face bedside interview with this patient regarding history of present illness, review of symptoms and relevant past medical, social and family history.  I completed an independent physical examination. I have communicated the patient’s plan of care and disposition with the resident.  58 year old female presents with headache and dizziness starting at 8 pm. Dizziness described as room spinning, no slurred speech or extremity weakness  Gen: NAD, well appearing  CV: RRR  Pul: CTA b/l  Abd: Soft, non-distended, non-tender  Neuro: no focal deficits  Pt improved, neuro intact, stable for dc

## 2023-11-12 NOTE — ED PROVIDER NOTE - PATIENT PORTAL LINK FT
You can access the FollowMyHealth Patient Portal offered by [facility] by registering at the following website: [email or web address]. By joining [employer]’s FollowMyHealth portal, you will also be able to view your health information using other applications (apps) compatible with our system.

## 2023-11-12 NOTE — ED PROVIDER NOTE - PHYSICAL EXAMINATION
General: well appearing, NAD  Head: NC, AT  EENT: EOMI, no scleral icterus  Cardiac: RRR, no apparent murmurs, no lower extremity edema  Respiratory: CTABL, no respiratory distress   Abdomen: soft, ND, NT, nonperitonitic  MSK/Vascular: full ROM, soft compartments, warm extremities  Neuro: AAOx3, sensation to light touch intactx4, 5/5x4, no drift x4, no facial droop, ftn wnl, ambulating without assistance w normal gait, no nystagmus  Psych: calm, cooperative

## 2023-11-12 NOTE — ED PROVIDER NOTE - NS ED ROS FT
Constitutional: no fever, no chills  Head: NC, AT   Eyes: no redness   ENMT: no nasal congestion/drainage, no sore throat   CV: no chest pain, no edema  Resp: no cough, no dyspnea  GI: no abdominal pain, no nausea, no vomiting, no diarrhea  : no dysuria, no hematuria   Skin: no lesions, no rashes   Neuro: no LOC, +headache, no sensory deficits, no weakness

## 2023-11-12 NOTE — ED ADULT NURSE NOTE - OBJECTIVE STATEMENT
plan of care assumed @ 2133. 57 y/o female presents to ED c/o sudden sharp onset of headache that began approx 2030 tonight. took Tylenol @ home prior to arrival, but offered no relief. states headahce "starts from the front and goes to the back". denies dizziness, blurry vision, photophobia, n/v, fever, chills, cp, sob. pt is able to speak in full and coherent sentence.

## 2023-11-12 NOTE — ED PROVIDER NOTE - NSFOLLOWUPINSTRUCTIONS_ED_ALL_ED_FT
Seguimiento con el neurólogo proporcionado.    [place] los medicamentos proporcionados para los mareos.    [place] tylenol e ibuprofeno para los rosaura de chencho.    Vuelva si presenta cualquier síntoma nuevo o que empeore.    Cefalea migrañosa  Migraine Headache  Leonides cefalea migrañosa es un dolor intenso y punzante en neida o ambos lados de la chencho. Las cefaleas migrañosas también pueden causar otros síntomas, johnnie náuseas, vómitos y sensibilidad a la faustino y el ruido. Leonides cefalea migrañosa puede durar desde 4 horas hasta 3 días. Hable con gonzalez médico sobre los factores que pueden causar (desencadenar) las cefaleas migrañosas.    ¿Cuáles son las causas?  Se desconoce la causa exacta de esta afección. Sin embargo, leonides migraña puede aparecer cuando los nervios del cerebro se irritan y liberan ciertas sustancias químicas que causan inflamación de los vasos sanguíneos. Esta inflamación provoca dolor. Esta afección puede desencadenarse o ser causada por lo siguiente:  Consumo de alcohol.  Consumo de cigarrillos.  Jabier medicamentos johnnie por ejemplo:  Medicamentos para aliviar el dolor torácico (nitroglicerina).  Anticonceptivos orales.  Estrógeno.  Ciertos medicamentos para la presión arterial.  Mercedes o beber productos que contienen nitratos, glutamato, aspartamo o tiramina. Los quesos añejados, el chocolate o la cafeína también pueden ser desencadenantes.  Hacer actividad física.  Otros factores que pueden provocar cefalea migrañosa son los siguientes:  Menstruación.  Embarazo.  Hambre.  Estrés.  Dormir poco o dormir demasiado.  Cambios climáticos.  Fatiga.  ¿Qué incrementa el riesgo?  Los siguientes factores pueden hacer que usted sea más propenso a tener migrañas:  Tener cierta edad. Es más probable que esta afección se manifieste en personas que tienen entre 25 y 55 años.  Ser bryson.  Tener antecedentes familiares de cefalea migrañosa.  Ser de safia caucásica.  Tener leonides afección de keisha mental, johnnie depresión o ansiedad.  Ser praveen.  ¿Cuáles son los signos o los síntomas?  El principal síntoma de esta afección es el dolor intenso y punzante. Molly dolor puede tener las siguientes características:  Puede aparecer en cualquier región de la chencho, tanto de un lado johnnie de ambos.  Puede interferir con las actividades de la anup cotidiana.  Puede empeorar con la actividad física.  Puede empeorar ante la exposición a luces brillantes o a ruidos betsy.  Otros síntomas pueden incluir:  Náuseas.  Vómitos.  Mareos.  Sensibilidad general a las luces brillantes, a los ruidos betsy o a los olores.  Antes de tener leonides cefalea migrañosa, puede recibir señales de advertencia (aura). Un aura puede incluir:  Nadia luces intermitentes o tener puntos ciegos.  Nadia puntos brillantes, halos o líneas en zigzag.  Tener leonides visión en túnel o visión borrosa.  Sentir entumecimiento u hormigueo.  Tener dificultad para hablar.  Debilidad muscular.  Algunas personas tienen síntomas después de leonides cefalea migrañosa (fase posdromal), johnnie los siguientes:  Cansancio.  Dificultad para concentrarte.  ¿Cómo se diagnostica?  La cefalea migrañosa se diagnostica en función de lo siguiente:  Jessica síntomas.  Un examen físico.  Pruebas, johnnie, por ejemplo:  Tomografía computarizada (TC) o resonancia magnética (RM) de la chencho. Estos estudios de diagnóstico por imágenes pueden ayudar a descartar otras causas de cefalea.  Jabier leonides muestra de líquido de la médula newsome (punción lumbar) para analizar (análisis de líquido cefalorraquídeo o análisis de LCR).  ¿Cómo se trata?  Esta afección se puede tratar con medicamentos para:  Aliviar el dolor.  Aliviar las náuseas.  Prevenir las cefaleas migrañosas.  El tratamiento de esta afección también puede incluir lo siguiente:  Acupuntura.  Cambios en el estilo de anup, johnnie evitar los alimentos que provocan las cefaleas migrañosas.  Biorretroalimentación.  Terapia cognitiva conductual.  Siga estas instrucciones en gonzalez casa:  Medicamentos    Use los medicamentos de venta shar y los recetados solamente johnnie se lo haya indicado el médico.  Pregúntele al médico si el medicamento recetado:  Hace que sea necesario que evite conducir o usar maquinaria pesada.  Puede causarle estreñimiento. Es posible que tenga que jabier estas medidas para prevenir o tratar el estreñimiento:  Beber suficiente líquido johnnie para mantener la orina de color amarillo pálido.  Jabier medicamentos recetados o de venta shar.  Consumir alimentos ricos en fibra, johnnie frijoles, cereales integrales, y frutas y verduras frescas.  Limitar el consumo de alimentos ricos en grasa y azúcares procesados, johnnie los alimentos fritos o dulces.  Estilo de anup    No malia alcohol.  No consuma ningún producto que contenga nicotina o tabaco, johnnie cigarrillos, cigarrillos electrónicos y tabaco de mascar. Si necesita ayuda para dejar de fumar, consulte al médico.  Duerma johnnie mínimo 8 horas todas las noches.  Encuentre modos de manejar el estrés, por ejemplo, a través de la meditación, la respiración profunda o el yoga.  Indicaciones generales        Lleve un registro diario para averiguar lo que puede provocar las cefaleas migrañosas. Registre, por ejemplo, lo siguiente:  Lo que usted come y cynthia.  El tiempo que duerme.  Algún cambio en gonzalez dieta o en los medicamentos.  Si tiene leonides cefalea migrañosa:  Evite los factores que empeoren los síntomas, johnnie las luces brillantes.  Resulta útil acostarse en leonides habitación oscura y silenciosa.  No conduzca vehículos ni opere maquinaria pesada.  Pregúntele al médico qué actividades son seguras para usted cuando tiene síntomas.  Concurra a todas las visitas de seguimiento johnnie se lo haya indicado el médico. [place] es importante.  Comuníquese con un médico si:  Tiene síntomas de cefalea migrañosa que son distintos o más intensos que los habituales.  Tiene más de 15 días de cefalea por mes.  Solicite ayuda inmediatamente si:  La cefalea migrañosa se vuelve cada vez más intensa.  La cefalea migrañosa dura más de 72 horas.  Tiene fiebre.  Presenta rigidez en el nasra.  Presenta pérdida de la visión.  Siente debilidad en los músculos o que no puede controlarlos.  Comienza a perder el equilibrio con frecuencia.  Presenta dificultad para caminar.  Se desmaya.  Tiene leonides convulsión.  Resumen  Leonides cefalea migrañosa es un dolor intenso y punzante en neida o ambos lados de la chencho. Las migrañas también pueden causar otros síntomas, johnnie náuseas, vómitos y sensibilidad a la faustino y el ruido.  Esta afección puede tratarse con medicamentos y cambios en el estilo de anup. También es posible que deba evitar ciertos factores que desencadenan leonides cefalea migrañosa.  Lleve un registro diario para averiguar lo que puede provocar las cefaleas migrañosas.  Comuníquese con el médico si tiene más de 15 días de cefalea en un mes o presenta síntomas de cefalea migrañosa que son distintos o más intensos que los habituales.  Esta información no tiene johnnie fin reemplazar el consejo del médico. Asegúrese de hacerle al médico cualquier pregunta que tenga.

## 2023-11-16 ENCOUNTER — RESULT REVIEW (OUTPATIENT)
Age: 58
End: 2023-11-16

## 2023-11-16 ENCOUNTER — APPOINTMENT (OUTPATIENT)
Dept: INTERNAL MEDICINE | Facility: CLINIC | Age: 58
End: 2023-11-16
Payer: MEDICARE

## 2023-11-16 ENCOUNTER — APPOINTMENT (OUTPATIENT)
Dept: CT IMAGING | Facility: CLINIC | Age: 58
End: 2023-11-16
Payer: MEDICARE

## 2023-11-16 ENCOUNTER — OUTPATIENT (OUTPATIENT)
Dept: OUTPATIENT SERVICES | Facility: HOSPITAL | Age: 58
LOS: 1 days | End: 2023-11-16
Payer: COMMERCIAL

## 2023-11-16 ENCOUNTER — NON-APPOINTMENT (OUTPATIENT)
Age: 58
End: 2023-11-16

## 2023-11-16 VITALS — BODY MASS INDEX: 26.2 KG/M2 | HEIGHT: 60.5 IN | HEART RATE: 72 BPM | WEIGHT: 137 LBS | RESPIRATION RATE: 12 BRPM

## 2023-11-16 VITALS — DIASTOLIC BLOOD PRESSURE: 78 MMHG | HEART RATE: 78 BPM | SYSTOLIC BLOOD PRESSURE: 118 MMHG | RESPIRATION RATE: 15 BRPM

## 2023-11-16 VITALS — BODY MASS INDEX: 26.2 KG/M2 | WEIGHT: 137 LBS | HEIGHT: 60.5 IN

## 2023-11-16 DIAGNOSIS — Z23 ENCOUNTER FOR IMMUNIZATION: ICD-10-CM

## 2023-11-16 DIAGNOSIS — J84.9 INTERSTITIAL PULMONARY DISEASE, UNSPECIFIED: ICD-10-CM

## 2023-11-16 DIAGNOSIS — Z98.82 BREAST IMPLANT STATUS: Chronic | ICD-10-CM

## 2023-11-16 DIAGNOSIS — Z98.890 OTHER SPECIFIED POSTPROCEDURAL STATES: Chronic | ICD-10-CM

## 2023-11-16 DIAGNOSIS — Z00.8 ENCOUNTER FOR OTHER GENERAL EXAMINATION: ICD-10-CM

## 2023-11-16 DIAGNOSIS — Z90.710 ACQUIRED ABSENCE OF BOTH CERVIX AND UTERUS: Chronic | ICD-10-CM

## 2023-11-16 DIAGNOSIS — Z00.00 ENCOUNTER FOR GENERAL ADULT MEDICAL EXAMINATION W/OUT ABNORMAL FINDINGS: ICD-10-CM

## 2023-11-16 PROCEDURE — 93000 ELECTROCARDIOGRAM COMPLETE: CPT

## 2023-11-16 PROCEDURE — 71250 CT THORAX DX C-: CPT

## 2023-11-16 PROCEDURE — 71250 CT THORAX DX C-: CPT | Mod: 26

## 2023-11-16 PROCEDURE — G0438: CPT

## 2023-11-16 PROCEDURE — 90471 IMMUNIZATION ADMIN: CPT

## 2023-11-16 PROCEDURE — 36415 COLL VENOUS BLD VENIPUNCTURE: CPT

## 2023-11-16 PROCEDURE — 90715 TDAP VACCINE 7 YRS/> IM: CPT

## 2023-11-17 LAB
25(OH)D3 SERPL-MCNC: 17.6 NG/ML
APPEARANCE: CLEAR
BACTERIA: NEGATIVE /HPF
BILIRUBIN URINE: NEGATIVE
BLOOD URINE: NEGATIVE
CAST: 1 /LPF
CHOLEST SERPL-MCNC: 242 MG/DL
COLOR: YELLOW
CREAT SPEC-SCNC: 39 MG/DL
EPITHELIAL CELLS: 0 /HPF
ERYTHROCYTE [SEDIMENTATION RATE] IN BLOOD BY WESTERGREN METHOD: 39 MM/HR
ESTIMATED AVERAGE GLUCOSE: 111 MG/DL
GLUCOSE QUALITATIVE U: NEGATIVE MG/DL
HBA1C MFR BLD HPLC: 5.5 %
HDLC SERPL-MCNC: 40 MG/DL
KETONES URINE: NEGATIVE MG/DL
LDLC SERPL CALC-MCNC: 140 MG/DL
LEUKOCYTE ESTERASE URINE: NEGATIVE
MICROALBUMIN 24H UR DL<=1MG/L-MCNC: <1.2 MG/DL
MICROALBUMIN/CREAT 24H UR-RTO: NORMAL MG/G
MICROSCOPIC-UA: NORMAL
NITRITE URINE: NEGATIVE
NONHDLC SERPL-MCNC: 202 MG/DL
PH URINE: 6
PROTEIN URINE: NEGATIVE MG/DL
RED BLOOD CELLS URINE: 0 /HPF
SPECIFIC GRAVITY URINE: 1.01
T4 FREE SERPL-MCNC: 1.3 NG/DL
TRIGL SERPL-MCNC: 340 MG/DL
TSH SERPL-ACNC: 2.96 UIU/ML
UROBILINOGEN URINE: 0.2 MG/DL
WHITE BLOOD CELLS URINE: 0 /HPF

## 2023-11-21 ENCOUNTER — NON-APPOINTMENT (OUTPATIENT)
Age: 58
End: 2023-11-21

## 2023-12-04 PROBLEM — M54.16 LUMBAR RADICULOPATHY: Status: ACTIVE | Noted: 2017-01-18

## 2023-12-04 PROBLEM — M85.80 OSTEOPENIA AFTER MENOPAUSE: Status: ACTIVE | Noted: 2023-06-08

## 2023-12-08 ENCOUNTER — RESULT REVIEW (OUTPATIENT)
Age: 58
End: 2023-12-08

## 2023-12-08 ENCOUNTER — NON-APPOINTMENT (OUTPATIENT)
Age: 58
End: 2023-12-08

## 2023-12-08 ENCOUNTER — APPOINTMENT (OUTPATIENT)
Dept: ULTRASOUND IMAGING | Facility: CLINIC | Age: 58
End: 2023-12-08
Payer: MEDICARE

## 2023-12-08 ENCOUNTER — OUTPATIENT (OUTPATIENT)
Dept: OUTPATIENT SERVICES | Facility: HOSPITAL | Age: 58
LOS: 1 days | End: 2023-12-08
Payer: COMMERCIAL

## 2023-12-08 ENCOUNTER — APPOINTMENT (OUTPATIENT)
Dept: MAMMOGRAPHY | Facility: CLINIC | Age: 58
End: 2023-12-08
Payer: MEDICARE

## 2023-12-08 DIAGNOSIS — R92.8 OTHER ABNORMAL AND INCONCLUSIVE FINDINGS ON DIAGNOSTIC IMAGING OF BREAST: ICD-10-CM

## 2023-12-08 DIAGNOSIS — Z98.890 OTHER SPECIFIED POSTPROCEDURAL STATES: Chronic | ICD-10-CM

## 2023-12-08 DIAGNOSIS — Z90.710 ACQUIRED ABSENCE OF BOTH CERVIX AND UTERUS: Chronic | ICD-10-CM

## 2023-12-08 DIAGNOSIS — Z98.82 BREAST IMPLANT STATUS: Chronic | ICD-10-CM

## 2023-12-08 DIAGNOSIS — Z00.00 ENCOUNTER FOR GENERAL ADULT MEDICAL EXAMINATION WITHOUT ABNORMAL FINDINGS: ICD-10-CM

## 2023-12-08 DIAGNOSIS — N63.10 UNSPECIFIED LUMP IN THE RIGHT BREAST, UNSPECIFIED QUADRANT: ICD-10-CM

## 2023-12-08 PROCEDURE — G0279: CPT

## 2023-12-08 PROCEDURE — G0279: CPT | Mod: 26

## 2023-12-08 PROCEDURE — 76642 ULTRASOUND BREAST LIMITED: CPT

## 2023-12-08 PROCEDURE — 76642 ULTRASOUND BREAST LIMITED: CPT | Mod: 26,RT

## 2023-12-08 PROCEDURE — 77066 DX MAMMO INCL CAD BI: CPT | Mod: 26

## 2023-12-08 PROCEDURE — 77066 DX MAMMO INCL CAD BI: CPT

## 2023-12-12 ENCOUNTER — OFFICE (OUTPATIENT)
Dept: URBAN - METROPOLITAN AREA CLINIC 116 | Facility: CLINIC | Age: 58
Setting detail: OPHTHALMOLOGY
End: 2023-12-12
Payer: MEDICARE

## 2023-12-12 DIAGNOSIS — H26.493: ICD-10-CM

## 2023-12-12 DIAGNOSIS — H16.223: ICD-10-CM

## 2023-12-12 PROCEDURE — 92012 INTRM OPH EXAM EST PATIENT: CPT | Performed by: PHYSICIAN ASSISTANT

## 2023-12-12 ASSESSMENT — REFRACTION_CURRENTRX
OS_VPRISM_DIRECTION: PROGS
OD_ADD: +2.50
OD_CYLINDER: 0.00
OD_CYLINDER: 0.00
OS_SPHERE: +1.00
OD_SPHERE: +1.25
OS_AXIS: 000
OS_VPRISM_DIRECTION: SV
OD_ADD: +3.00
OD_OVR_VA: 20/
OD_VPRISM_DIRECTION: SV
OS_OVR_VA: 20/
OD_AXIS: 000
OS_AXIS: 000
OS_SPHERE: +1.00
OS_CYLINDER: 0.00
OD_AXIS: 000
OD_VPRISM_DIRECTION: PROGS
OS_OVR_VA: 20/
OD_OVR_VA: 20/
OS_ADD: +2.75
OD_SPHERE: +1.25
OS_ADD: +3.00
OS_CYLINDER: 0.00

## 2023-12-12 ASSESSMENT — CONFRONTATIONAL VISUAL FIELD TEST (CVF)
OD_FINDINGS: FULL
OS_FINDINGS: FULL

## 2023-12-12 ASSESSMENT — REFRACTION_MANIFEST
OS_SPHERE: -0.25
OS_AXIS: 005
OD_VA1: 20/150
OD_VA1: 20/150
OS_AXIS: 107
OS_VA1: 20/25
OD_CYLINDER: 0.00
OD_AXIS: 000
OS_CYLINDER: -0.25
OD_AXIS: 000
OD_SPHERE: +2.00
OS_CYLINDER: -0.50
OD_SPHERE: +2.00
OD_CYLINDER: 0.00
OS_VA1: 20/200
OS_SPHERE: +1.50

## 2023-12-12 ASSESSMENT — SUPERFICIAL PUNCTATE KERATITIS (SPK)
OS_SPK: 2+
OD_SPK: 2+

## 2023-12-12 ASSESSMENT — REFRACTION_AUTOREFRACTION
OD_AXIS: 180
OD_SPHERE: PL
OS_SPHERE: -0.25
OD_CYLINDER: -0.50
OS_CYLINDER: SPH

## 2023-12-12 ASSESSMENT — SPHEQUIV_DERIVED
OD_SPHEQUIV: 2
OS_SPHEQUIV: -0.5
OD_SPHEQUIV: 2
OS_SPHEQUIV: 1.375

## 2023-12-12 ASSESSMENT — LID EXAM ASSESSMENTS
OS_COMMENTS: TATTOO ON SUPERIOR & INFERIOR LIDS
OD_COMMENTS: TATTOO ON SUPERIOR & INFERIOR LIDS

## 2023-12-13 ENCOUNTER — APPOINTMENT (OUTPATIENT)
Dept: ULTRASOUND IMAGING | Facility: CLINIC | Age: 58
End: 2023-12-13
Payer: MEDICARE

## 2023-12-13 ENCOUNTER — RESULT REVIEW (OUTPATIENT)
Age: 58
End: 2023-12-13

## 2023-12-13 ENCOUNTER — OUTPATIENT (OUTPATIENT)
Dept: OUTPATIENT SERVICES | Facility: HOSPITAL | Age: 58
LOS: 1 days | End: 2023-12-13
Payer: COMMERCIAL

## 2023-12-13 DIAGNOSIS — Z98.82 BREAST IMPLANT STATUS: Chronic | ICD-10-CM

## 2023-12-13 DIAGNOSIS — Z98.890 OTHER SPECIFIED POSTPROCEDURAL STATES: Chronic | ICD-10-CM

## 2023-12-13 DIAGNOSIS — Z90.710 ACQUIRED ABSENCE OF BOTH CERVIX AND UTERUS: Chronic | ICD-10-CM

## 2023-12-13 DIAGNOSIS — R92.8 OTHER ABNORMAL AND INCONCLUSIVE FINDINGS ON DIAGNOSTIC IMAGING OF BREAST: ICD-10-CM

## 2023-12-13 PROCEDURE — 77065 DX MAMMO INCL CAD UNI: CPT

## 2023-12-13 PROCEDURE — 88305 TISSUE EXAM BY PATHOLOGIST: CPT

## 2023-12-13 PROCEDURE — A4648: CPT

## 2023-12-13 PROCEDURE — 88305 TISSUE EXAM BY PATHOLOGIST: CPT | Mod: 26

## 2023-12-13 PROCEDURE — 19083 BX BREAST 1ST LESION US IMAG: CPT

## 2023-12-13 PROCEDURE — 77065 DX MAMMO INCL CAD UNI: CPT | Mod: 26,RT

## 2023-12-13 PROCEDURE — 19083 BX BREAST 1ST LESION US IMAG: CPT | Mod: RT

## 2023-12-26 ENCOUNTER — APPOINTMENT (OUTPATIENT)
Dept: PULMONOLOGY | Facility: CLINIC | Age: 58
End: 2023-12-26
Payer: MEDICARE

## 2023-12-26 VITALS
DIASTOLIC BLOOD PRESSURE: 74 MMHG | HEART RATE: 78 BPM | RESPIRATION RATE: 15 BRPM | SYSTOLIC BLOOD PRESSURE: 124 MMHG | WEIGHT: 137 LBS | BODY MASS INDEX: 26.2 KG/M2 | OXYGEN SATURATION: 96 % | HEIGHT: 60.5 IN

## 2023-12-26 PROCEDURE — 99213 OFFICE O/P EST LOW 20 MIN: CPT

## 2023-12-26 NOTE — HISTORY OF PRESENT ILLNESS
[Former] : former [TextBox_4] : 58F PMH asymptomatic COVID (09/2021), former smoker, Sjogren's, ILD on MMF and Prednisone 1mg Q48H who presents for follow up visit. Had repeat CT chest that showed increased inflammatory changes compared to CT in 2021. She reports her breathing is improved since last visit. She does have occasional cough, which is alleviated by using Breo ellipta or albuterol. No fevers, no chills. No chest pain. No N/V/D. No recent illness or hospitalization.   [place]  #[id number] [TextBox_11] : 0.1 [TextBox_13] : 4 [YearQuit] : 1991

## 2023-12-26 NOTE — PHYSICAL EXAM
[TextEntry] : Gen - In NAD, communicating easily and in full sentences HEENT - NC/AT CV - +S1, S2 Resp - B/L crackles, non-labored breathing Ext - No pedal edema Skin - No exposed rashes or lesions Neuro - Moves all four extremities Psych - Normal affect

## 2023-12-26 NOTE — RESULTS/DATA
Number Of Freeze-Thaw Cycles: 1 freeze-thaw cycle Detail Level: Simple Duration Of Freeze Thaw-Cycle (Seconds): 5 Render Post-Care Instructions In Note?: no [TextEntry] : LATONIA EXAM: [id number] - CT CHEST - ORDERED BY: DELFINO WILKINS   PROCEDURE DATE: 11/16/2023    INTERPRETATION: EXAMINATION: CT CHEST  CLINICAL INDICATION: Interstitial lung disease.  TECHNIQUE: Noncontrast CT of the chest was obtained.  COMPARISON: Multiple CT, most recent 12/14/2021.  FINDINGS:  AIRWAYS AND LUNGS: The central tracheobronchial tree is patent. Peripheral reticular and groundglass opacities with areas of subpleural sparing and traction bronchiectasis which demonstrates increased groundglass opacity compared to the prior study. Clustered and tubular/branching opacities are also similar to the prior study.  MEDIASTINUM AND PLEURA: There are no enlarged mediastinal, hilar or axillary lymph nodes. The visualized portion of the thyroid gland is unremarkable. There is no pleural effusion. There is no pneumothorax.  HEART AND VESSELS: The heart is normal in size. There are no atherosclerotic calcifications of the aorta. There is no pericardial effusion.  UPPER ABDOMEN: Images of the upper abdomen demonstrate hiatal hernia.  BONES AND SOFT TISSUES: The bones are unremarkable. Bilateral breast implants  IMPRESSION: Interstitial lung disease with increased groundglass opacity compared to the prior study that may be infectious/inflammatory including active interstitial lung disease.  --- End of Report ---       TARA ALMENDAREZ MD; Attending Radiologist This document has been electronically signed. Nov 27 2023 7:39PM  ~~~~~~~~~~~~~~~~~~~~~~~~~~~~~~~~~~~~~~~~~~~~~~~~~~~~~~~~~~~~~~~~~~~~~~~~

## 2023-12-26 NOTE — ASSESSMENT
[FreeTextEntry1] : 58F PMH asymptomatic COVID (09/2021), former smoker, Sjogren's, ILD on MMF and Prednisone 1mg Q48H who presents for follow up visit.   - Had repeat CT chest that showed increased inflammatory changes compared to CT in 2021 - Repeat CT 11/2024 - However symptomatically she is improved - Will continue Prednisone 1mg Q48H - C/w Breo ellipta 200 and PRN albuterol - F/u in 4 mo time  The patient expressed understanding and agreement with the plan as outlined above, and accepts that it is their responsibility to be compliant with any recommended testing, treatment, and follow-up visits. All relevant questions and concerns were addressed.  25 minutes of time were spent on the encounter. Medical records were reviewed, including but not limited to hospital records, outpatient records, laboratory data, and diagnostic imaging studies. Greater than 50% of the face-to-face encounter time was spent on counseling and/or coordination of care.  Yakov Sherman M.D. Pulmonary & Critical Care Medicine [facility] at [place] [address] [place], [place] [address] T: [phone] F: [phone]

## 2024-01-29 ENCOUNTER — APPOINTMENT (OUTPATIENT)
Dept: RHEUMATOLOGY | Facility: CLINIC | Age: 59
End: 2024-01-29
Payer: MEDICARE

## 2024-01-29 ENCOUNTER — LABORATORY RESULT (OUTPATIENT)
Age: 59
End: 2024-01-29

## 2024-01-29 VITALS
DIASTOLIC BLOOD PRESSURE: 78 MMHG | HEART RATE: 93 BPM | HEIGHT: 60 IN | SYSTOLIC BLOOD PRESSURE: 138 MMHG | WEIGHT: 293 LBS | BODY MASS INDEX: 57.52 KG/M2 | OXYGEN SATURATION: 91 %

## 2024-01-29 DIAGNOSIS — Z79.899 OTHER LONG TERM (CURRENT) DRUG THERAPY: ICD-10-CM

## 2024-01-29 PROCEDURE — 36415 COLL VENOUS BLD VENIPUNCTURE: CPT

## 2024-01-29 PROCEDURE — 99214 OFFICE O/P EST MOD 30 MIN: CPT

## 2024-01-30 LAB
25(OH)D3 SERPL-MCNC: 25.1 NG/ML
ALBUMIN SERPL ELPH-MCNC: 4.3 G/DL
ALP BLD-CCNC: 122 U/L
ALT SERPL-CCNC: 22 U/L
ANION GAP SERPL CALC-SCNC: 10 MMOL/L
AST SERPL-CCNC: 23 U/L
BASOPHILS # BLD AUTO: 0.07 K/UL
BASOPHILS NFR BLD AUTO: 0.8 %
BILIRUB SERPL-MCNC: 0.2 MG/DL
BUN SERPL-MCNC: 15 MG/DL
CALCIUM SERPL-MCNC: 9.9 MG/DL
CHLORIDE SERPL-SCNC: 106 MMOL/L
CO2 SERPL-SCNC: 24 MMOL/L
CREAT SERPL-MCNC: 0.79 MG/DL
CRP SERPL-MCNC: <3 MG/L
DEPRECATED KAPPA LC FREE/LAMBDA SER: 1.4 RATIO
EGFR: 86 ML/MIN/1.73M2
EOSINOPHIL # BLD AUTO: 0.32 K/UL
EOSINOPHIL NFR BLD AUTO: 3.5 %
ERYTHROCYTE [SEDIMENTATION RATE] IN BLOOD BY WESTERGREN METHOD: 39 MM/HR
GLUCOSE SERPL-MCNC: 81 MG/DL
HAV IGM SER QL: NONREACTIVE
HBV CORE IGG+IGM SER QL: NONREACTIVE
HBV CORE IGM SER QL: NONREACTIVE
HBV SURFACE AG SER QL: NONREACTIVE
HCT VFR BLD CALC: 42.7 %
HCV AB SER QL: NONREACTIVE
HCV S/CO RATIO: 0.17 S/CO
HGB BLD-MCNC: 14.2 G/DL
IGA SER QL IEP: 298 MG/DL
IGG SER QL IEP: 1295 MG/DL
IGM SER QL IEP: 127 MG/DL
IMM GRANULOCYTES NFR BLD AUTO: 0.5 %
KAPPA LC CSF-MCNC: 2.36 MG/DL
KAPPA LC SERPL-MCNC: 3.31 MG/DL
LYMPHOCYTES # BLD AUTO: 2.93 K/UL
LYMPHOCYTES NFR BLD AUTO: 31.9 %
MAN DIFF?: NORMAL
MCHC RBC-ENTMCNC: 31 PG
MCHC RBC-ENTMCNC: 33.3 GM/DL
MCV RBC AUTO: 93.2 FL
MONOCYTES # BLD AUTO: 0.59 K/UL
MONOCYTES NFR BLD AUTO: 6.4 %
NEUTROPHILS # BLD AUTO: 5.23 K/UL
NEUTROPHILS NFR BLD AUTO: 56.9 %
PLATELET # BLD AUTO: 325 K/UL
POTASSIUM SERPL-SCNC: 4.1 MMOL/L
PROT SERPL-MCNC: 7.2 G/DL
RBC # BLD: 4.58 M/UL
RBC # FLD: 13.3 %
SODIUM SERPL-SCNC: 140 MMOL/L
WBC # FLD AUTO: 9.19 K/UL

## 2024-01-31 LAB
M TB IFN-G BLD-IMP: NEGATIVE
QUANTIFERON TB PLUS MITOGEN MINUS NIL: 6.42 IU/ML
QUANTIFERON TB PLUS NIL: 0.02 IU/ML
QUANTIFERON TB PLUS TB1 MINUS NIL: 0.01 IU/ML
QUANTIFERON TB PLUS TB2 MINUS NIL: 0 IU/ML

## 2024-02-04 LAB
ANTI-BETA2 GLYCOPROTEIN 1 IGA CONCENTRATION: 16 U/ML
ANTI-BETA2 GLYCOPROTEIN 1 IGG CONCENTRATION: 2 U/ML
ANTI-BETA2 GLYCOPROTEIN 1 IGG CONCENTRATION: 2 U/ML
ANTI-BETA2 GLYCOPROTEIN 1 IGM CONCENTRATION: 2 U/ML
ANTI-BETA2 GLYCOPROTEIN 1 IGM CONCENTRATION: 2 U/ML
ANTI-C1Q IGG CONCENTRATION: <3 UNITS
ANTI-CARDIOLIPIN IGA CONCENTRATION: 5 APL
ANTI-CARDIOLIPIN IGG CONCENTRATION: 2 GPL
ANTI-CARDIOLIPIN IGG CONCENTRATION: 2 GPL
ANTI-CARDIOLIPIN IGM CONCENTRATION: 2 MPL
ANTI-CARDIOLIPIN IGM CONCENTRATION: 2 MPL
ANTI-CENP IGG CONCENTRATION: 1 U/ML
ANTI-CYCLIC CITRULLINATED PEPTIDE IGG CONCENTRATION: 1 U/ML
ANTI-DOUBLE-STRANDED DNA IGG CONCENTRATION: 48 IU/ML
ANTI-GBM IGG CONCENTRATION: <2.9
ANTI-JO-1 IGG CONCENTRATION: 0 U/ML
ANTI-MPO IGG CONCENTRATION: <3.2
ANTI-NEUTROPHIL CYTOPLASMIC ANTIBODIES - TITER: NORMAL
ANTI-NUCLEAR ANTIBODIES - CYTOPLASMIC PATTERN: NORMAL
ANTI-NUCLEAR ANTIBODIES - PRIMARY NUCLEAR PATTERN: NORMAL
ANTI-NUCLEAR ANTIBODIES - PRIMARY PATTERN TITER: ABNORMAL
ANTI-NUCLEAR ANTIBODIES IGG CONCENTRATION: >150 UNITS
ANTI-PHOSPHATIDYLSERINE/PROTHROMBIN IGG CONCENTRATION: 46 UNITS
ANTI-PHOSPHATIDYLSERINE/PROTHROMBIN IGM CONCENTRATION: 12 UNITS
ANTI-PR3 IGG CONCENTRATION: <2.3
ANTI-RIBOSOMAL P IGG CONCENTRATION: 1 U/ML
ANTI-RNA POL III IGG CONCENTRATION: <0.7 U/ML
ANTI-RNP70 IGG CONCENTRATION: 1 U/ML
ANTI-RO52 IGG CONCENTRATION: 0 U/ML
ANTI-RO60 IGG CONCENTRATION: >240 U/ML
ANTI-SCL-70 IGG CONCENTRATION: <0.6 U/ML
ANTI-SMITH IGG CONCENTRATION: <0.7 U/ML
ANTI-SS-B (LA) IGG CONCENTRATION: >320 U/ML
ANTI-THYROGLOBULIN IGG CONCENTRATION: 14 IU/ML
ANTI-THYROID PEROXIDASE IGG CONCENTRATION: 5 IU/ML
ANTI-U1RNP IGG CONCENTRATION: 22 U/ML
AVISE CARP IGG: 25 UNITS
AVISE LUPUS INDEX: -4
AVISE LUPUS RESULT: NEGATIVE
AVISE VASCULITIS RESULT: NORMAL
B-LYMPHOCYTE-BOUND C4D (BC4D) LEVEL: 7
ERYTHROCYTE-BOUND C4D (EC4D) LEVEL: 3
RHEUMATOID FACTOR (IGA) CONCENTRATION: 97 IU/ML
RHEUMATOID FACTOR (IGM) CONCENTRATION: 1 IU/ML

## 2024-02-16 ENCOUNTER — APPOINTMENT (OUTPATIENT)
Dept: INTERNAL MEDICINE | Facility: CLINIC | Age: 59
End: 2024-02-16
Payer: MEDICARE

## 2024-02-16 VITALS — RESPIRATION RATE: 12 BRPM | DIASTOLIC BLOOD PRESSURE: 78 MMHG | HEART RATE: 82 BPM | SYSTOLIC BLOOD PRESSURE: 126 MMHG

## 2024-02-16 VITALS — WEIGHT: 143 LBS | HEIGHT: 60 IN | BODY MASS INDEX: 28.07 KG/M2

## 2024-02-16 DIAGNOSIS — Z79.52 LONG TERM (CURRENT) USE OF SYSTEMIC STEROIDS: ICD-10-CM

## 2024-02-16 DIAGNOSIS — R76.8 OTHER SPECIFIED ABNORMAL IMMUNOLOGICAL FINDINGS IN SERUM: ICD-10-CM

## 2024-02-16 DIAGNOSIS — G44.209 TENSION-TYPE HEADACHE, UNSPECIFIED, NOT INTRACTABLE: ICD-10-CM

## 2024-02-16 PROCEDURE — G0444 DEPRESSION SCREEN ANNUAL: CPT | Mod: 59

## 2024-02-16 PROCEDURE — 99214 OFFICE O/P EST MOD 30 MIN: CPT

## 2024-02-16 RX ORDER — OMEPRAZOLE 40 MG/1
40 CAPSULE, DELAYED RELEASE ORAL
Qty: 90 | Refills: 2 | Status: ACTIVE | COMMUNITY
Start: 2021-04-26 | End: 1900-01-01

## 2024-02-16 RX ORDER — CYCLOBENZAPRINE HYDROCHLORIDE 10 MG/1
10 TABLET, FILM COATED ORAL
Qty: 30 | Refills: 1 | Status: ACTIVE | COMMUNITY
Start: 2023-11-16 | End: 1900-01-01

## 2024-02-16 NOTE — PHYSICAL EXAM
[Slightly Antalgic] : slightly antalgic [LE] : Sensory: Intact in bilateral lower extremities [DP] : dorsalis pedis 2+ and symmetric bilaterally [PT] : posterior tibial 2+ and symmetric bilaterally [Normal] : Alert and in no acute distress [Poor Appearance] : well-appearing [Acute Distress] : not in acute distress [Obese] : not obese [de-identified] : The patient has no respiratory distress. Mood and affect are normal. The patient is alert and oriented to person, place and time. There is no pain with active or passive motion of the hips.  There is no tenderness of either hip.  Examination of the knees demonstrates a large ecchymosis on the anteromedial aspect of the left knee.  Quadriceps and hamstring function are intact bilaterally.  There is no instability of collateral or cruciate ligaments.  Range of motion 0 to 110 degrees of both knees.  The calves are soft and nontender.  The skin is intact.  There is no lymphedema. [de-identified] : ACC: [id number] EXAM: XR KNEE 3 VIEWS BI ORDERED BY: KINGSLEY LMBENEDICT  PROCEDURE DATE: 08/13/2023 INTERPRETATION: CLINICAL INDICATION: Trauma, evaluate for fracture  TECHNIQUE: Bilateral knees, 3 views each  COMPARISON: Right knee radiographs dated 3/4/2018  FINDINGS: No acute fracture or dislocation. Moderate medial compartment joint space narrowing is present with tricompartment osteophytes and subchondral sclerosis are present bilaterally. Small effusions are present bilaterally.  IMPRESSION: No acute fracture or dislocation. Moderate bilateral knee effusions. Moderate degenerative arthritis bilaterally.  --- End of Report ---  HARRY RENDON DO; Resident Radiologist This document has been electronically signed. SHLOMIT GOLDBERG-STEIN MD; Attending Radiologist This document has been electronically signed. Aug 13 2023 6:27PM   I have reviewed x-rays of both knees taken August 13, 2023.  There are no fractures or dislocations. [No Acute Distress] : no acute distress [Well Nourished] : well nourished [Well Developed] : well developed [Well-Appearing] : well-appearing [Normal Sclera/Conjunctiva] : normal sclera/conjunctiva [PERRL] : pupils equal round and reactive to light [EOMI] : extraocular movements intact [Normal Outer Ear/Nose] : the outer ears and nose were normal in appearance [Normal Oropharynx] : the oropharynx was normal [No JVD] : no jugular venous distention [No Lymphadenopathy] : no lymphadenopathy [Supple] : supple [Thyroid Normal, No Nodules] : the thyroid was normal and there were no nodules present [No Respiratory Distress] : no respiratory distress  [No Accessory Muscle Use] : no accessory muscle use [Normal Rate] : normal rate  [Regular Rhythm] : with a regular rhythm [Normal S1, S2] : normal S1 and S2 [No Murmur] : no murmur heard [No Carotid Bruits] : no carotid bruits [No Abdominal Bruit] : a ~M bruit was not heard ~T in the abdomen [No Varicosities] : no varicosities [Pedal Pulses Present] : the pedal pulses are present [No Edema] : there was no peripheral edema [No Palpable Aorta] : no palpable aorta [No Extremity Clubbing/Cyanosis] : no extremity clubbing/cyanosis [Soft] : abdomen soft [Non Tender] : non-tender [Non-distended] : non-distended [No Masses] : no abdominal mass palpated [No HSM] : no HSM [Normal Bowel Sounds] : normal bowel sounds [Normal Posterior Cervical Nodes] : no posterior cervical lymphadenopathy [Normal Anterior Cervical Nodes] : no anterior cervical lymphadenopathy [No CVA Tenderness] : no CVA  tenderness [No Spinal Tenderness] : no spinal tenderness [No Joint Swelling] : no joint swelling [Grossly Normal Strength/Tone] : grossly normal strength/tone [No Rash] : no rash [Coordination Grossly Intact] : coordination grossly intact [No Focal Deficits] : no focal deficits [Normal Gait] : normal gait [Deep Tendon Reflexes (DTR)] : deep tendon reflexes were 2+ and symmetric [Normal Affect] : the affect was normal [Normal Insight/Judgement] : insight and judgment were intact [de-identified] : soila

## 2024-02-16 NOTE — HEALTH RISK ASSESSMENT
[No] : No [No falls in past year] : Patient reported no falls in the past year [Little interest or pleasure doing things] : 1) Little interest or pleasure doing things [Feeling down, depressed, or hopeless] : 2) Feeling down, depressed, or hopeless [0] : 2) Feeling down, depressed, or hopeless: Not at all (0) [PHQ-2 Negative - No further assessment needed] : PHQ-2 Negative - No further assessment needed [UUK1Dotaz] : 0 [Never] : Never

## 2024-02-16 NOTE — HISTORY OF PRESENT ILLNESS
[FreeTextEntry1] : follow up [place] [de-identified] : folllow up sjogren with pulm involvement is on inhalres, steroids adn cellcept dry cough but overall ok would like med for tension headache

## 2024-03-05 ENCOUNTER — APPOINTMENT (OUTPATIENT)
Dept: INTERNAL MEDICINE | Facility: CLINIC | Age: 59
End: 2024-03-05
Payer: MEDICARE

## 2024-03-05 VITALS
TEMPERATURE: 98.1 F | HEART RATE: 73 BPM | RESPIRATION RATE: 16 BRPM | OXYGEN SATURATION: 97 % | HEIGHT: 60 IN | DIASTOLIC BLOOD PRESSURE: 84 MMHG | BODY MASS INDEX: 28.07 KG/M2 | WEIGHT: 143 LBS | SYSTOLIC BLOOD PRESSURE: 127 MMHG

## 2024-03-05 DIAGNOSIS — K13.79 OTHER LESIONS OF ORAL MUCOSA: ICD-10-CM

## 2024-03-05 PROCEDURE — 99214 OFFICE O/P EST MOD 30 MIN: CPT

## 2024-03-05 NOTE — PHYSICAL EXAM
[Normal] : normal sclera/conjunctiva, pupils are equal, round and reactive to light and extraocular movements are intact [Normal Outer Ear/Nose] : the outer ears and nose were normal in appearance [de-identified] : L sided inner oral mucosa mass, around 1 cm in size no discharge  [Normal Oropharynx] : the oropharynx was normal

## 2024-03-05 NOTE — HISTORY OF PRESENT ILLNESS
[FreeTextEntry8] : Cait is a 60 yo F w PMHx intestinal lung disease, Sjorgen's with lung involvement walked in for new mouth growth  Patient yesterday notice she has a mass on her L inner cheek. Mass is painless.  Denies use of fillers, denies trauma to area, denies fevers or chills

## 2024-03-05 NOTE — ASSESSMENT
[FreeTextEntry1] : Painless mass of oral cavity located on L lower inner cheek  will order mri w contrast r/o abscess vs malignancy

## 2024-03-12 ENCOUNTER — APPOINTMENT (OUTPATIENT)
Dept: NEUROLOGY | Facility: CLINIC | Age: 59
End: 2024-03-12
Payer: MEDICARE

## 2024-03-12 ENCOUNTER — OFFICE (OUTPATIENT)
Dept: URBAN - METROPOLITAN AREA CLINIC 116 | Facility: CLINIC | Age: 59
Setting detail: OPHTHALMOLOGY
End: 2024-03-12
Payer: MEDICARE

## 2024-03-12 VITALS
BODY MASS INDEX: 28.07 KG/M2 | HEIGHT: 60 IN | DIASTOLIC BLOOD PRESSURE: 80 MMHG | WEIGHT: 143 LBS | SYSTOLIC BLOOD PRESSURE: 128 MMHG

## 2024-03-12 DIAGNOSIS — G44.89 OTHER HEADACHE SYNDROME: ICD-10-CM

## 2024-03-12 DIAGNOSIS — M35.02 SICCA SYNDROME WITH LUNG INVOLVEMENT: ICD-10-CM

## 2024-03-12 DIAGNOSIS — H16.223: ICD-10-CM

## 2024-03-12 PROCEDURE — G2211 COMPLEX E/M VISIT ADD ON: CPT

## 2024-03-12 PROCEDURE — 92012 INTRM OPH EXAM EST PATIENT: CPT | Performed by: PHYSICIAN ASSISTANT

## 2024-03-12 PROCEDURE — 99204 OFFICE O/P NEW MOD 45 MIN: CPT

## 2024-03-12 RX ORDER — AMITRIPTYLINE HYDROCHLORIDE 25 MG/1
25 TABLET, FILM COATED ORAL
Qty: 90 | Refills: 1 | Status: ACTIVE | COMMUNITY
Start: 2024-03-12 | End: 1900-01-01

## 2024-03-12 RX ORDER — PREDNISONE 5 MG/1
5 TABLET ORAL
Qty: 30 | Refills: 3 | Status: COMPLETED | COMMUNITY
Start: 2022-12-28 | End: 2024-03-12

## 2024-03-12 RX ORDER — ESTRADIOL 10 UG/1
10 TABLET, FILM COATED VAGINAL
Qty: 40 | Refills: 2 | Status: COMPLETED | COMMUNITY
Start: 2022-09-29 | End: 2024-03-12

## 2024-03-12 RX ORDER — PREDNISONE 1 MG/1
1 TABLET ORAL DAILY
Qty: 30 | Refills: 3 | Status: COMPLETED | COMMUNITY
Start: 2023-08-17 | End: 2024-03-12

## 2024-03-12 RX ORDER — FAMOTIDINE 40 MG/1
40 TABLET, FILM COATED ORAL
Qty: 30 | Refills: 5 | Status: COMPLETED | COMMUNITY
Start: 2023-01-13 | End: 2024-03-12

## 2024-03-12 ASSESSMENT — REFRACTION_CURRENTRX
OS_AXIS: 000
OD_VPRISM_DIRECTION: PROGS
OS_OVR_VA: 20/
OD_CYLINDER: 0.00
OD_SPHERE: +1.25
OS_VPRISM_DIRECTION: PROGS
OS_ADD: +2.75
OS_CYLINDER: 0.00
OS_VPRISM_DIRECTION: SV
OS_ADD: +3.00
OD_AXIS: 000
OS_AXIS: 000
OD_ADD: +3.00
OD_OVR_VA: 20/
OS_OVR_VA: 20/
OD_CYLINDER: 0.00
OS_SPHERE: +1.00
OD_ADD: +2.50
OS_CYLINDER: 0.00
OD_VPRISM_DIRECTION: SV
OD_SPHERE: +1.25
OD_AXIS: 000
OD_OVR_VA: 20/
OS_SPHERE: +1.00

## 2024-03-12 ASSESSMENT — REFRACTION_MANIFEST
OS_VA1: 20/200
OD_SPHERE: +2.00
OS_CYLINDER: -0.25
OS_AXIS: 107
OS_AXIS: 005
OD_CYLINDER: 0.00
OD_SPHERE: +2.00
OD_AXIS: 000
OD_VA1: 20/150
OS_SPHERE: -0.25
OS_CYLINDER: -0.50
OD_AXIS: 000
OS_VA1: 20/25
OD_CYLINDER: 0.00
OD_VA1: 20/150
OS_SPHERE: +1.50

## 2024-03-12 ASSESSMENT — SPHEQUIV_DERIVED
OS_SPHEQUIV: -0.5
OS_SPHEQUIV: 1.375
OD_SPHEQUIV: 2
OD_SPHEQUIV: 2

## 2024-03-12 ASSESSMENT — LID EXAM ASSESSMENTS
OD_COMMENTS: TATTOO ON SUPERIOR & INFERIOR LIDS
OS_COMMENTS: TATTOO ON SUPERIOR & INFERIOR LIDS

## 2024-03-12 NOTE — HISTORY OF PRESENT ILLNESS
[FreeTextEntry1] : I saw this patient in the office today.  She describes headaches for many years. This became worse in November 2023. She now has mild daily headache but reports that about once per week it becomes severe. She reports that she takes Tylenol a few times per week, which improves the headache.

## 2024-03-12 NOTE — CONSULT LETTER
[Dear  ___] : Dear  [unfilled], [Courtesy Letter:] : I had the pleasure of seeing your patient, [unfilled], in my office today. [Please see my note below.] : Please see my note below. [Consult Closing:] : Thank you very much for allowing me to participate in the care of this patient.  If you have any questions, please do not hesitate to contact me. [Sincerely,] : Sincerely, [FreeTextEntry3] : Zacarias Bonner MD.

## 2024-03-12 NOTE — DATA REVIEWED
[de-identified] : CT scan of the head was performed on 11/12/2023. The study was unremarkable. An incidental left frontal scalp lipoma was noted.  Blood test from January 2024 demonstrated:  Sedimentation rate of 39, and C-reactive protein of less than 3.

## 2024-03-12 NOTE — ASSESSMENT
[FreeTextEntry1] : This is a 59-year-old woman with history of chronic headache that has exacerbated in November 2023. Imaging has been negative.  Sedimentation rate and C-reactive protein are unremarkable.  (This has been followed by her rheumatologist due to the Sjogren's. There is nothing to suggest temporal arteritis.  I have explained that there are essentially 2 strategies for dealing with chronic headaches.  The first is abortive medication of which there are numerous over-the-counter preparations as well as prescription options.  The second strategy is prophylactic medications.  I have explained that these are medications which prevent headaches when taken on a regular basis.  I have explained that there are several medications which have been found to be preventative against headaches.  I have further explained that none of the medications have an immediate effect.  They must build up in the system over a few weeks.  Most people notice that within a few weeks on the medication, the headache intensity is diminishing.  Within a few more weeks most people begin to notice that the frequency is decreasing as well.  I have explained that the preventive medications were all originally used for other conditions but were also found to work against chronic headaches.  The patient seemed to understand my explanation.  I have suggested a trial of amitriptyline starting at 25 mg at bedtime. Hopefully this will decrease the frequency and intensity of her headaches.  I will see her back in 6 months.

## 2024-03-12 NOTE — PHYSICAL EXAM
[General Appearance - Alert] : alert [Oriented To Time, Place, And Person] : oriented to person, place, and time [General Appearance - In No Acute Distress] : in no acute distress [Affect] : the affect was normal [Memory Recent] : recent memory was not impaired [Memory Remote] : remote memory was not impaired [Dysarthria] : no dysarthria [Aphasia] : no dysphasia/aphasia [Cranial Nerves Optic (II)] : visual acuity intact bilaterally,  visual fields full to confrontation, pupils equal round and reactive to light [Cranial Nerves Oculomotor (III)] : extraocular motion intact [Cranial Nerves Trigeminal (V)] : facial sensation intact symmetrically [Cranial Nerves Facial (VII)] : face symmetrical [Cranial Nerves Vestibulocochlear (VIII)] : hearing was intact bilaterally [Cranial Nerves Glossopharyngeal (IX)] : tongue and palate midline [Cranial Nerves Accessory (XI - Cranial And Spinal)] : head turning and shoulder shrug symmetric [Cranial Nerves Hypoglossal (XII)] : there was no tongue deviation with protrusion [Motor Tone] : muscle tone was normal in all four extremities [Motor Strength] : muscle strength was normal in all four extremities [Sensation Tactile Decrease] : light touch was intact [Sensation Pain / Temperature Decrease] : pain and temperature was intact [Sensation Vibration Decrease] : vibration was intact [Romberg's Sign] : Romberg's sign was negtive [Abnormal Walk] : normal gait [Coordination - Dysmetria Impaired Finger-to-Nose Bilateral] : not present [Plantar Reflex Right Only] : normal on the right [2+] : Ankle jerk left 2+ [Plantar Reflex Left Only] : normal on the left [Optic Disc Abnormality] : the optic disc were normal in size and color

## 2024-03-14 ENCOUNTER — APPOINTMENT (OUTPATIENT)
Dept: MRI IMAGING | Facility: CLINIC | Age: 59
End: 2024-03-14
Payer: MEDICARE

## 2024-03-14 ENCOUNTER — OUTPATIENT (OUTPATIENT)
Dept: OUTPATIENT SERVICES | Facility: HOSPITAL | Age: 59
LOS: 1 days | End: 2024-03-14
Payer: COMMERCIAL

## 2024-03-14 DIAGNOSIS — Z98.890 OTHER SPECIFIED POSTPROCEDURAL STATES: Chronic | ICD-10-CM

## 2024-03-14 DIAGNOSIS — Z98.82 BREAST IMPLANT STATUS: Chronic | ICD-10-CM

## 2024-03-14 DIAGNOSIS — K13.79 OTHER LESIONS OF ORAL MUCOSA: ICD-10-CM

## 2024-03-14 DIAGNOSIS — Z90.710 ACQUIRED ABSENCE OF BOTH CERVIX AND UTERUS: Chronic | ICD-10-CM

## 2024-03-14 PROCEDURE — 70552 MRI BRAIN STEM W/DYE: CPT

## 2024-03-14 PROCEDURE — A9585: CPT

## 2024-03-14 PROCEDURE — 70552 MRI BRAIN STEM W/DYE: CPT | Mod: 26

## 2024-03-20 ENCOUNTER — OUTPATIENT (OUTPATIENT)
Dept: OUTPATIENT SERVICES | Facility: HOSPITAL | Age: 59
LOS: 1 days | End: 2024-03-20
Payer: COMMERCIAL

## 2024-03-20 ENCOUNTER — APPOINTMENT (OUTPATIENT)
Dept: ULTRASOUND IMAGING | Facility: CLINIC | Age: 59
End: 2024-03-20
Payer: MEDICARE

## 2024-03-20 DIAGNOSIS — K13.79 OTHER LESIONS OF ORAL MUCOSA: ICD-10-CM

## 2024-03-20 DIAGNOSIS — Z98.82 BREAST IMPLANT STATUS: Chronic | ICD-10-CM

## 2024-03-20 DIAGNOSIS — Z90.710 ACQUIRED ABSENCE OF BOTH CERVIX AND UTERUS: Chronic | ICD-10-CM

## 2024-03-20 DIAGNOSIS — Z98.890 OTHER SPECIFIED POSTPROCEDURAL STATES: Chronic | ICD-10-CM

## 2024-03-20 PROCEDURE — 76536 US EXAM OF HEAD AND NECK: CPT | Mod: 26

## 2024-03-20 PROCEDURE — 76536 US EXAM OF HEAD AND NECK: CPT

## 2024-03-25 ENCOUNTER — OUTPATIENT (OUTPATIENT)
Dept: OUTPATIENT SERVICES | Facility: HOSPITAL | Age: 59
LOS: 1 days | End: 2024-03-25
Payer: COMMERCIAL

## 2024-03-25 ENCOUNTER — APPOINTMENT (OUTPATIENT)
Dept: MRI IMAGING | Facility: CLINIC | Age: 59
End: 2024-03-25
Payer: MEDICARE

## 2024-03-25 DIAGNOSIS — Z98.890 OTHER SPECIFIED POSTPROCEDURAL STATES: Chronic | ICD-10-CM

## 2024-03-25 DIAGNOSIS — Z90.710 ACQUIRED ABSENCE OF BOTH CERVIX AND UTERUS: Chronic | ICD-10-CM

## 2024-03-25 DIAGNOSIS — Z98.82 BREAST IMPLANT STATUS: Chronic | ICD-10-CM

## 2024-03-25 DIAGNOSIS — K13.79 OTHER LESIONS OF ORAL MUCOSA: ICD-10-CM

## 2024-03-25 PROCEDURE — 70542 MRI ORBIT/FACE/NECK W/DYE: CPT

## 2024-03-25 PROCEDURE — 70542 MRI ORBIT/FACE/NECK W/DYE: CPT | Mod: 26

## 2024-04-03 ENCOUNTER — APPOINTMENT (OUTPATIENT)
Dept: RHEUMATOLOGY | Facility: CLINIC | Age: 59
End: 2024-04-03

## 2024-04-15 ENCOUNTER — APPOINTMENT (OUTPATIENT)
Dept: PULMONOLOGY | Facility: CLINIC | Age: 59
End: 2024-04-15
Payer: MEDICARE

## 2024-04-15 VITALS
DIASTOLIC BLOOD PRESSURE: 80 MMHG | RESPIRATION RATE: 16 BRPM | WEIGHT: 143 LBS | OXYGEN SATURATION: 96 % | SYSTOLIC BLOOD PRESSURE: 120 MMHG | HEART RATE: 106 BPM | BODY MASS INDEX: 28.07 KG/M2 | HEIGHT: 60 IN

## 2024-04-15 DIAGNOSIS — Z09 ENCOUNTER FOR FOLLOW-UP EXAMINATION AFTER COMPLETED TREATMENT FOR CONDITIONS OTHER THAN MALIGNANT NEOPLASM: ICD-10-CM

## 2024-04-15 PROCEDURE — 99214 OFFICE O/P EST MOD 30 MIN: CPT

## 2024-04-15 PROCEDURE — G2211 COMPLEX E/M VISIT ADD ON: CPT

## 2024-04-15 RX ORDER — FLUTICASONE FUROATE, UMECLIDINIUM BROMIDE AND VILANTEROL TRIFENATATE 200; 62.5; 25 UG/1; UG/1; UG/1
200-62.5-25 POWDER RESPIRATORY (INHALATION) DAILY
Qty: 1 | Refills: 5 | Status: ACTIVE | COMMUNITY
Start: 2024-04-15 | End: 1900-01-01

## 2024-04-15 RX ORDER — FLUTICASONE FUROATE AND VILANTEROL TRIFENATATE 200; 25 UG/1; UG/1
200-25 POWDER RESPIRATORY (INHALATION)
Qty: 1 | Refills: 5 | Status: DISCONTINUED | COMMUNITY
Start: 2022-01-25 | End: 2024-04-15

## 2024-04-15 RX ORDER — ALBUTEROL SULFATE 90 UG/1
108 (90 BASE) INHALANT RESPIRATORY (INHALATION)
Qty: 1 | Refills: 5 | Status: ACTIVE | COMMUNITY
Start: 2021-10-22 | End: 1900-01-01

## 2024-04-15 RX ORDER — PREDNISONE 1 MG/1
1 TABLET ORAL EVERY OTHER DAY
Qty: 15 | Refills: 5 | Status: ACTIVE | COMMUNITY
Start: 2024-04-15 | End: 1900-01-01

## 2024-04-15 NOTE — ASSESSMENT
[FreeTextEntry1] : 59F PMH asymptomatic COVID (09/2021), former smoker, Sjogren's, ILD on MMF and Prednisone 1mg Q48H who presents for follow up visit.   - Pt is using Breo 200 BID - Advised this is meant to be used once per day, however she feels better when using it twice per day - Will increase to Trelegy 200 - Refilled albuterol - Refilled Prednisone 1mg Q48H - CT reviewed with patient - repeat 11/2024 - ordered - Otherwise f/u in 3-4 mo time  The patient expressed understanding and agreement with the plan as outlined above and accepts responsibility to be compliant with any recommended testing, treatment, and follow-up visits.  All relevant questions and concerns were addressed.  30 minutes of time were spent on the encounter. Medical records were reviewed, including but not limited to hospital records, outpatient records, laboratory data, and diagnostic imaging studies. Greater than 50% of the face-to-face encounter time was spent on counseling and/or coordination of care.  Yakov Sherman MD, [facility] Pulmonary & Critical Care Medicine [facility] at [place] [address] [place], [place] [address] T: [phone] F: [phone]

## 2024-04-15 NOTE — RESULTS/DATA
[TextEntry] : LATONIA 	 EXAM: [id number] - CT CHEST  - ORDERED BY: DELFINO WILKINS   PROCEDURE DATE:  11/16/2023    INTERPRETATION:  EXAMINATION: CT CHEST  CLINICAL INDICATION: Interstitial lung disease.  TECHNIQUE: Noncontrast CT of the chest was obtained.  COMPARISON: Multiple CT, most recent 12/14/2021.  FINDINGS:  AIRWAYS AND LUNGS: The central tracheobronchial tree is patent.  Peripheral reticular and groundglass opacities with areas of subpleural sparing and traction bronchiectasis which demonstrates increased groundglass opacity compared to the prior study. Clustered and tubular/branching opacities are also similar to the prior study.  MEDIASTINUM AND PLEURA: There are no enlarged mediastinal, hilar or axillary lymph nodes. The visualized portion of the thyroid gland is unremarkable. There is no pleural effusion. There is no pneumothorax.  HEART AND VESSELS: The heart is normal in size.  There are no atherosclerotic calcifications of the aorta.  There is no pericardial effusion.  UPPER ABDOMEN: Images of the upper abdomen demonstrate hiatal hernia.  BONES AND SOFT TISSUES: The bones are unremarkable.  Bilateral breast implants  IMPRESSION: Interstitial lung disease with increased groundglass opacity compared to the prior study that may be infectious/inflammatory including active interstitial lung disease.  --- End of Report ---       TARA ALMENDAREZ MD; Attending Radiologist This document has been electronically signed. Nov 27 2023  7:39PM  ~~~~~~~~~~~~~~~~~~~~~~~~~~~~~~~~~~~~~~~~~~~~~~~~~~~~~~~~~~~~~~~~~~~~~~~~

## 2024-04-15 NOTE — HISTORY OF PRESENT ILLNESS
[Former] : former [TextBox_4] : 59F PMH asymptomatic COVID (09/2021), former smoker, Sjogren's, ILD on MMF and Prednisone 1mg Q48H who presents for follow up visit. She is on Breo ellipta 200. She is using it twice per day. She is also using albuterol 1-2x/day. She feels relief when using Breo twice per day.   [place]  #[id number] [TextBox_11] : 0.1 [TextBox_13] : 4 [YearQuit] : 1991

## 2024-04-16 ENCOUNTER — APPOINTMENT (OUTPATIENT)
Dept: SURGERY | Facility: CLINIC | Age: 59
End: 2024-04-16
Payer: MEDICARE

## 2024-04-16 VITALS
BODY MASS INDEX: 26.5 KG/M2 | DIASTOLIC BLOOD PRESSURE: 84 MMHG | HEART RATE: 94 BPM | HEIGHT: 62 IN | SYSTOLIC BLOOD PRESSURE: 134 MMHG | WEIGHT: 144 LBS

## 2024-04-16 PROCEDURE — 99204 OFFICE O/P NEW MOD 45 MIN: CPT

## 2024-04-18 NOTE — ASSESSMENT
[FreeTextEntry1] : discussed options for management including aspiration vs excision. risks, benefits and alternatives discussed at length.  I have discussed with the patient the anatomy of the area, the pathophysiology of the disease process and the rationale for surgery.  The attendant risks, possible complications and expected postoperative course have been discussed in detail.  I have given the patient the opportunity to ask questions, and all questions have been answered to the patient's satisfaction, and they wish to proceed with the planned procedure. potential for sensory changes, recurrence, scarring, lip weakness discussed. to be scheduled ambulatory at [facility]

## 2024-04-18 NOTE — HISTORY OF PRESENT ILLNESS
[de-identified] : Pt c/o cystic mass inside cheek which is growing.  notes some dry mouth.  denies pain or infection, drainage or history of trauma, dysphagia, hoarseness or RT exposure.  MRI: left oral cavity 2 cm cystic mass, submucosal, along buccinator I have reviewed all old and new data and available images.  Additional information was obtained from others present at the time of visit to ensure the completeness of the history

## 2024-04-18 NOTE — CONSULT LETTER
[Dear  ___] : Dear  [unfilled], [Consult Letter:] : I had the pleasure of evaluating your patient, [unfilled]. [Please see my note below.] : Please see my note below. [Consult Closing:] : Thank you very much for allowing me to participate in the care of this patient.  If you have any questions, please do not hesitate to contact me. [Sincerely,] : Sincerely, [FreeTextEntry2] : Dr. Baldemar Friedman, Dr. Peter Good [FreeTextEntry3] : Benito Alicea MD, FACS System Director, Endocrine Surgery [facility] Associate  Professor of Surgery [facility] [employer] [facility] [DrJenny  ___] : Dr. OTERO

## 2024-04-18 NOTE — PHYSICAL EXAM
[de-identified] : no palpable thyroid nodules [Laryngoscopy Performed] : laryngoscopy was performed, see procedure section for findings [Midline] : located in midline position [de-identified] : 2 cm left lower buccal mass adherent to overlying mucosa, well circumscribed and mobile [Normal] : orientation to person, place, and time: normal [de-identified] : indirect  laryngoscopy shows normal vocal cord mobility bilaterally with no lesions noted

## 2024-04-18 NOTE — REASON FOR VISIT
[Initial Consultation] : an initial consultation for [Spouse] : spouse [Source: ______] : History obtained from [unfilled] [FreeTextEntry2] : Cheek cyst

## 2024-05-01 ENCOUNTER — APPOINTMENT (OUTPATIENT)
Dept: RHEUMATOLOGY | Facility: CLINIC | Age: 59
End: 2024-05-01
Payer: MEDICARE

## 2024-05-01 ENCOUNTER — OUTPATIENT (OUTPATIENT)
Dept: OUTPATIENT SERVICES | Facility: HOSPITAL | Age: 59
LOS: 1 days | End: 2024-05-01

## 2024-05-01 VITALS
RESPIRATION RATE: 14 BRPM | SYSTOLIC BLOOD PRESSURE: 129 MMHG | TEMPERATURE: 98 F | DIASTOLIC BLOOD PRESSURE: 85 MMHG | HEIGHT: 60 IN | HEART RATE: 75 BPM | OXYGEN SATURATION: 99 % | WEIGHT: 143.96 LBS

## 2024-05-01 VITALS
HEART RATE: 108 BPM | HEIGHT: 62 IN | SYSTOLIC BLOOD PRESSURE: 128 MMHG | WEIGHT: 144 LBS | BODY MASS INDEX: 26.5 KG/M2 | TEMPERATURE: 98.2 F | OXYGEN SATURATION: 96 % | DIASTOLIC BLOOD PRESSURE: 80 MMHG | RESPIRATION RATE: 17 BRPM

## 2024-05-01 DIAGNOSIS — Z87.09 PERSONAL HISTORY OF OTHER DISEASES OF THE RESPIRATORY SYSTEM: ICD-10-CM

## 2024-05-01 DIAGNOSIS — R76.8 OTHER SPECIFIED ABNORMAL IMMUNOLOGICAL FINDINGS IN SERUM: ICD-10-CM

## 2024-05-01 DIAGNOSIS — Z91.89 OTHER SPECIFIED PERSONAL RISK FACTORS, NOT ELSEWHERE CLASSIFIED: ICD-10-CM

## 2024-05-01 DIAGNOSIS — K13.79 OTHER LESIONS OF ORAL MUCOSA: ICD-10-CM

## 2024-05-01 DIAGNOSIS — Z98.82 BREAST IMPLANT STATUS: Chronic | ICD-10-CM

## 2024-05-01 DIAGNOSIS — Z98.890 OTHER SPECIFIED POSTPROCEDURAL STATES: Chronic | ICD-10-CM

## 2024-05-01 DIAGNOSIS — H40.9 UNSPECIFIED GLAUCOMA: Chronic | ICD-10-CM

## 2024-05-01 DIAGNOSIS — G89.29 MYALGIA, OTHER SITE: ICD-10-CM

## 2024-05-01 DIAGNOSIS — M79.18 MYALGIA, OTHER SITE: ICD-10-CM

## 2024-05-01 DIAGNOSIS — K21.9 GASTRO-ESOPHAGEAL REFLUX DISEASE W/OUT ESOPHAGITIS: ICD-10-CM

## 2024-05-01 DIAGNOSIS — M54.12 RADICULOPATHY, CERVICAL REGION: ICD-10-CM

## 2024-05-01 DIAGNOSIS — N81.10 CYSTOCELE, UNSPECIFIED: Chronic | ICD-10-CM

## 2024-05-01 DIAGNOSIS — Z90.710 ACQUIRED ABSENCE OF BOTH CERVIX AND UTERUS: Chronic | ICD-10-CM

## 2024-05-01 PROCEDURE — 99214 OFFICE O/P EST MOD 30 MIN: CPT

## 2024-05-01 PROCEDURE — G2211 COMPLEX E/M VISIT ADD ON: CPT

## 2024-05-01 RX ORDER — TRIAMCINOLONE ACETONIDE 5 MG/G
0.5 CREAM TOPICAL TWICE DAILY
Qty: 2 | Refills: 1 | Status: ACTIVE | COMMUNITY
Start: 2024-05-01 | End: 1900-01-01

## 2024-05-01 RX ORDER — MYCOPHENOLATE MOFETIL 500 MG/1
500 TABLET ORAL
Qty: 360 | Refills: 1 | Status: ACTIVE | COMMUNITY
Start: 2021-03-05 | End: 1900-01-01

## 2024-05-01 RX ORDER — FLUTICASONE FUROATE AND VILANTEROL TRIFENATATE 100; 25 UG/1; UG/1
1 POWDER RESPIRATORY (INHALATION)
Qty: 0 | Refills: 0 | DISCHARGE

## 2024-05-01 RX ORDER — IBUPROFEN 200 MG
1 TABLET ORAL
Qty: 0 | Refills: 0 | DISCHARGE

## 2024-05-01 NOTE — H&P PST ADULT - NSICDXPASTMEDICALHX_GEN_ALL_CORE_FT
PAST MEDICAL HISTORY:  2019 novel coronavirus disease (COVID-19) 9/12/21    Cervical spinal stenosis     Cystocele with prolapse     ILD (interstitial lung disease)     No pertinent past medical history     Positive CURLY (antinuclear antibody)     Rectocele     Sjogren's disease      PAST MEDICAL HISTORY:  2019 novel coronavirus disease (COVID-19) 9/12/21    Cervical spinal stenosis     Cystocele with prolapse     Dry eye syndrome     Glaucoma     ILD (interstitial lung disease)     Mass of buccal mucosa     Neuralgic migraines     Positive CURLY (antinuclear antibody)     Pulmonary fibrosis     Rectocele     Sjogren's disease

## 2024-05-01 NOTE — H&P PST ADULT - OTHER CARE PROVIDERS
Dr. Arroyo  [phone]   Dr. Sherman [phone] [facility] [phone]  Dr. Sherman ( [facility]) [phone]

## 2024-05-01 NOTE — H&P PST ADULT - GASTROINTESTINAL
details… normal/soft/nontender/nondistended/normal active bowel sounds/no guarding/no rigidity/no organomegaly/no palpable qasim/no masses palpable

## 2024-05-01 NOTE — H&P PST ADULT - HISTORY OF PRESENT ILLNESS
60y/o female scheduled for excision buccal mass on 5/10/2023.  Pt states, "hx of sjogren disease, ILD, pulmonary fibrosis, gerd, with left buccal mass for the past 6 weeks increasing in size, denies pain."

## 2024-05-01 NOTE — H&P PST ADULT - NSICDXPASTSURGICALHX_GEN_ALL_CORE_FT
PAST SURGICAL HISTORY:  H/O abdominoplasty     H/O bilateral breast implants     H/O vaginal hysterectomy 2012    S/P breast lumpectomy      PAST SURGICAL HISTORY:  Female cystocele     Glaucoma     H/O abdominoplasty     H/O bilateral breast implants     H/O vaginal hysterectomy 2012    S/P breast lumpectomy     S/P LASIK surgery

## 2024-05-01 NOTE — H&P PST ADULT - PROBLEM SELECTOR PLAN 1
Pt scheduled for excision left buccal mass on 5/10/2024.   labs cbc, cmp done 1/29/2024 results in chart .  Preop teaching done, pt able to verbalize understanding.   medication day of procedure-  omeprazole, cyclobenzaprine, mycophenolate, prednisone trelegy, Restasis, albuterol

## 2024-05-02 PROBLEM — G44.009 CLUSTER HEADACHE SYNDROME, UNSPECIFIED, NOT INTRACTABLE: Chronic | Status: ACTIVE | Noted: 2024-05-01

## 2024-05-02 PROBLEM — J84.10 PULMONARY FIBROSIS, UNSPECIFIED: Chronic | Status: ACTIVE | Noted: 2024-05-01

## 2024-05-02 PROBLEM — K21.9 CHRONIC GERD: Status: ACTIVE | Noted: 2018-05-29

## 2024-05-02 PROBLEM — Z87.09 HISTORY OF ASPIRATION PNEUMONITIS: Status: RESOLVED | Noted: 2020-11-19 | Resolved: 2024-05-02

## 2024-05-02 PROBLEM — Z91.89 AT HIGH RISK FOR OSTEOPOROSIS: Status: ACTIVE | Noted: 2023-01-05

## 2024-05-02 PROBLEM — M54.12 CERVICAL RADICULOPATHY: Status: ACTIVE | Noted: 2017-01-18

## 2024-05-02 PROBLEM — H04.129 DRY EYE SYNDROME OF UNSPECIFIED LACRIMAL GLAND: Chronic | Status: ACTIVE | Noted: 2024-05-01

## 2024-05-02 PROBLEM — M79.18 CHRONIC MUSCULOSKELETAL PAIN: Status: ACTIVE | Noted: 2021-08-24

## 2024-05-02 PROBLEM — H40.9 UNSPECIFIED GLAUCOMA: Chronic | Status: ACTIVE | Noted: 2024-05-01

## 2024-05-02 PROBLEM — K13.79 OTHER LESIONS OF ORAL MUCOSA: Chronic | Status: ACTIVE | Noted: 2024-05-01

## 2024-05-02 LAB
ALBUMIN SERPL ELPH-MCNC: 4.3 G/DL
ALP BLD-CCNC: 125 U/L
ALT SERPL-CCNC: 22 U/L
ANION GAP SERPL CALC-SCNC: 11 MMOL/L
AST SERPL-CCNC: 26 U/L
BASOPHILS # BLD AUTO: 0.05 K/UL
BASOPHILS NFR BLD AUTO: 0.7 %
BILIRUB SERPL-MCNC: 0.2 MG/DL
BUN SERPL-MCNC: 14 MG/DL
CALCIUM SERPL-MCNC: 9.4 MG/DL
CHLORIDE SERPL-SCNC: 105 MMOL/L
CO2 SERPL-SCNC: 25 MMOL/L
CREAT SERPL-MCNC: 0.78 MG/DL
CRP SERPL-MCNC: <3 MG/L
EGFR: 86 ML/MIN/1.73M2
EOSINOPHIL # BLD AUTO: 0.26 K/UL
EOSINOPHIL NFR BLD AUTO: 3.6 %
ERYTHROCYTE [SEDIMENTATION RATE] IN BLOOD BY WESTERGREN METHOD: 9 MM/HR
GLUCOSE SERPL-MCNC: 103 MG/DL
HCT VFR BLD CALC: 41.3 %
HGB BLD-MCNC: 13.3 G/DL
IMM GRANULOCYTES NFR BLD AUTO: 0.4 %
LYMPHOCYTES # BLD AUTO: 2.7 K/UL
LYMPHOCYTES NFR BLD AUTO: 37.6 %
MAN DIFF?: NORMAL
MCHC RBC-ENTMCNC: 30 PG
MCHC RBC-ENTMCNC: 32.2 GM/DL
MCV RBC AUTO: 93 FL
MONOCYTES # BLD AUTO: 0.52 K/UL
MONOCYTES NFR BLD AUTO: 7.2 %
NEUTROPHILS # BLD AUTO: 3.63 K/UL
NEUTROPHILS NFR BLD AUTO: 50.5 %
PLATELET # BLD AUTO: 285 K/UL
POTASSIUM SERPL-SCNC: 4.5 MMOL/L
PROT SERPL-MCNC: 7 G/DL
RBC # BLD: 4.44 M/UL
RBC # FLD: 13.2 %
SODIUM SERPL-SCNC: 141 MMOL/L
WBC # FLD AUTO: 7.19 K/UL

## 2024-05-10 ENCOUNTER — TRANSCRIPTION ENCOUNTER (OUTPATIENT)
Age: 59
End: 2024-05-10

## 2024-05-10 ENCOUNTER — OUTPATIENT (OUTPATIENT)
Dept: OUTPATIENT SERVICES | Facility: HOSPITAL | Age: 59
LOS: 1 days | Discharge: HOME | End: 2024-05-10
Payer: MEDICARE

## 2024-05-10 ENCOUNTER — APPOINTMENT (OUTPATIENT)
Dept: SURGERY | Facility: HOSPITAL | Age: 59
End: 2024-05-10
Payer: MEDICARE

## 2024-05-10 ENCOUNTER — RESULT REVIEW (OUTPATIENT)
Age: 59
End: 2024-05-10

## 2024-05-10 VITALS
TEMPERATURE: 98 F | HEIGHT: 60 IN | HEART RATE: 87 BPM | RESPIRATION RATE: 16 BRPM | OXYGEN SATURATION: 99 % | DIASTOLIC BLOOD PRESSURE: 88 MMHG | WEIGHT: 143.96 LBS | SYSTOLIC BLOOD PRESSURE: 133 MMHG

## 2024-05-10 VITALS
OXYGEN SATURATION: 98 % | RESPIRATION RATE: 15 BRPM | TEMPERATURE: 97 F | SYSTOLIC BLOOD PRESSURE: 126 MMHG | DIASTOLIC BLOOD PRESSURE: 83 MMHG | HEART RATE: 84 BPM

## 2024-05-10 DIAGNOSIS — Z98.890 OTHER SPECIFIED POSTPROCEDURAL STATES: Chronic | ICD-10-CM

## 2024-05-10 DIAGNOSIS — N81.10 CYSTOCELE, UNSPECIFIED: Chronic | ICD-10-CM

## 2024-05-10 DIAGNOSIS — Z90.710 ACQUIRED ABSENCE OF BOTH CERVIX AND UTERUS: Chronic | ICD-10-CM

## 2024-05-10 DIAGNOSIS — Z98.82 BREAST IMPLANT STATUS: Chronic | ICD-10-CM

## 2024-05-10 DIAGNOSIS — K13.79 OTHER LESIONS OF ORAL MUCOSA: ICD-10-CM

## 2024-05-10 DIAGNOSIS — H40.9 UNSPECIFIED GLAUCOMA: Chronic | ICD-10-CM

## 2024-05-10 PROCEDURE — 88305 TISSUE EXAM BY PATHOLOGIST: CPT | Mod: 26

## 2024-05-10 PROCEDURE — 40816 EXCISION OF MOUTH LESION: CPT

## 2024-05-10 RX ORDER — CYCLOSPORINE 0.5 MG/ML
1 EMULSION OPHTHALMIC
Refills: 0 | DISCHARGE

## 2024-05-10 RX ORDER — ACETAMINOPHEN 500 MG
1000 TABLET ORAL EVERY 6 HOURS
Refills: 0 | Status: DISCONTINUED | OUTPATIENT
Start: 2024-05-10 | End: 2024-05-24

## 2024-05-10 RX ORDER — FLUTICASONE FUROATE, UMECLIDINIUM BROMIDE AND VILANTEROL TRIFENATATE 200; 62.5; 25 UG/1; UG/1; UG/1
1 POWDER RESPIRATORY (INHALATION)
Refills: 0 | DISCHARGE

## 2024-05-10 RX ORDER — OMEPRAZOLE 10 MG/1
1 CAPSULE, DELAYED RELEASE ORAL
Qty: 0 | Refills: 0 | DISCHARGE

## 2024-05-10 RX ORDER — CYCLOBENZAPRINE HYDROCHLORIDE 10 MG/1
1 TABLET, FILM COATED ORAL
Refills: 0 | DISCHARGE

## 2024-05-10 RX ORDER — ALBUTEROL 90 UG/1
2 AEROSOL, METERED ORAL
Refills: 0 | DISCHARGE

## 2024-05-10 RX ORDER — ACETAMINOPHEN 500 MG
2 TABLET ORAL
Qty: 0 | Refills: 0 | DISCHARGE
Start: 2024-05-10

## 2024-05-10 RX ORDER — SODIUM CHLORIDE 9 MG/ML
1000 INJECTION, SOLUTION INTRAVENOUS
Refills: 0 | Status: DISCONTINUED | OUTPATIENT
Start: 2024-05-10 | End: 2024-05-24

## 2024-05-10 RX ORDER — MYCOPHENOLATE MOFETIL 250 MG/1
2 CAPSULE ORAL
Qty: 0 | Refills: 0 | DISCHARGE

## 2024-05-10 NOTE — ASU DISCHARGE PLAN (ADULT/PEDIATRIC) - CARE PROVIDER_API CALL
Benito Alicea  Surgery  [address] [place] [address] [address]  [place] [address]  Phone: [phone]  Fax: [phone]  Follow Up Time:

## 2024-05-10 NOTE — ASU DISCHARGE PLAN (ADULT/PEDIATRIC) - NS MD DC FALL RISK RISK
For information on Fall & Injury Prevention, visit: [email or web address] OR  [email or web address] OR  https://www.cdc.gov/steadi/patient.html

## 2024-05-10 NOTE — ASU PREOP CHECKLIST - INTERNAL PROSTHESES
Sharmin presents today for her OB visit. Spotting last night varies between light pink to bright red. This morning was brown and clear. No cramping some discomfort per pt.       Patient would like communication of their results via:     Cell Phone:   Telephone Information:   Mobile [phone]     Okay to leave a message containing results? Yes    Patient's current [facility] status: Active.     Chaperone needed:  Has  with her                no

## 2024-05-16 LAB — SURGICAL PATHOLOGY STUDY: SIGNIFICANT CHANGE UP

## 2024-05-23 ENCOUNTER — APPOINTMENT (OUTPATIENT)
Dept: SURGERY | Facility: CLINIC | Age: 59
End: 2024-05-23
Payer: MEDICARE

## 2024-05-23 DIAGNOSIS — K13.79 OTHER LESIONS OF ORAL MUCOSA: ICD-10-CM

## 2024-05-23 PROCEDURE — 99024 POSTOP FOLLOW-UP VISIT: CPT

## 2024-05-23 NOTE — ASSESSMENT
[FreeTextEntry1] : s/p excision buccal lesion daily care path discussed f/u 4 months sooner if needed

## 2024-05-23 NOTE — PHYSICAL EXAM
[de-identified] : well healed incision [Midline] : located in midline position [Normal] : orientation to person, place, and time: normal

## 2024-05-24 NOTE — H&P PST ADULT - BACK
[facility]  Plastic & Reconstructive Surgery  Andrew Shook M.D., M.S.  P: [phone]  F: [phone]      NEW PATIENT CONSULTATION    REASON FOR VISIT:  Blunt facial trauma    REFERRING PROVIDER:  Dr. Feli Haines MD    PRIMARY CARE PROVIDER:  Virginia Guevara MD     HISTORY OF PRESENT ILLNESS:   Horacio Tyler is a 29 month old male history of blunt facial trauma.  Trauma evaluation performed by emergency department attending with complex lower lip laceration.  Plastic surgery consultation for laceration repair.  No medications.  No medication allergies.  No prior surgery or hospitalizations.  Vaccinations reported up-to-date.     HISTORIES:  Please refer to scanned initial patient history form for supplemental data  History reviewed. No pertinent past medical history.  Past Surgical History:   Procedure Laterality Date    Tympanostomy tube placement       ALLERGIES:  No Known Allergies  Current Facility-Administered Medications   Medication Dose Route Frequency Provider Last Rate Last Admin    acetaminophen (TYLENOL) 160 MG/5ML suspension 275.2 mg  15 mg/kg Oral Once Feli Haines MD         No current outpatient medications on file.     Social History     Tobacco Use   Smoking Status Not on file   Smokeless Tobacco Not on file     Social History     Substance and Sexual Activity   Alcohol Use None     No family history on file.     REVIEW OF SYSTEMS:   Review of Systems    Prior past chronic medical problems reviewed.  Please refer to scanned ROS form    PHYSICAL EXAM:    Wt Readings from Last 1 Encounters:   05/24/24 (!) 18.3 kg (40 lb 5.5 oz) (>99%, Z= 2.68)*     * Growth percentiles are based on [facility] (Boys, 0-36 Months) data.            Physical Exam  Constitutional:       Appearance: Normal appearance.   HENT:      Head: Normocephalic.      Mouth/Throat:      Comments: Lower lip laceration 1.7 cm along the wet vermilion centrally, additional 1.8 cm irregular avulsion laceration along the  lower lip, dry vermilion crossing the vermilion border onto the normal cutaneous skin.  Exposed muscle.  Neurological:      Mental Status: He is alert.             ASSESSMENT:  Lip laceration, initial encounter  (primary encounter diagnosis)  Laceration of vermilion border of lower lip, initial encounter      PATIENT COUNSELIN month old male referred by Dr. Haines following laceration lower lip.      ED laceration repair  Risks of benefits discussed  Postprocedure care discussed    Bacitracin or Vaseline 2X daily  Avoid sucking on fingers to the extent possible  Okay to wash gently with soap and water pat dry.  No heavy scrubbing.  Follow-up with office 7 to 14 days    I spent 40 minutes with the patient, more than 50% was in direct patient contact and face to face counseling.    Andrew Shook MD   [facility]  Plastic & Reconstructive Surgery  Andrew Shook M.D., M.S.  P: [phone]  F: [phone]      The  Cures Act makes medical notes like these available to patients in the interest of transparency. However, be advised this is a medical document. It is intended as peer to peer communication. It is written in medical language and may contain abbreviations, jargon, and other verbiage that could be misleading or confusing to lay persons. It may appear blunt or direct. Medical documents are intended to carry relevant information, facts as evident, and the clinical opinion of the physician.    This note used Dragon technology. Transcription errors are not uncommon and may not have been corrected prior to electronically signing the note. Should you find these errors, please consult the clinician for interpretation (or apply common sense adjustment when safe and appropriate).        No deformity or limitation of movement

## 2024-06-06 ENCOUNTER — NON-APPOINTMENT (OUTPATIENT)
Age: 59
End: 2024-06-06

## 2024-06-06 ENCOUNTER — APPOINTMENT (OUTPATIENT)
Dept: ORTHOPEDIC SURGERY | Facility: CLINIC | Age: 59
End: 2024-06-06
Payer: MEDICARE

## 2024-06-06 PROCEDURE — 99204 OFFICE O/P NEW MOD 45 MIN: CPT

## 2024-06-06 PROCEDURE — 73630 X-RAY EXAM OF FOOT: CPT | Mod: LT

## 2024-06-06 NOTE — DISCUSSION/SUMMARY
[de-identified] : Based on the patient's physical examination findings history of present illness, I do believe the Lisfranc ligament injury seen on the MRI is an old injury.  Patient has no pain over the Lisfranc ligament injury/second TMTJ on physical examination today.  All of the patient's pain is localized to the base of the fifth metatarsal where on MRI, although the radiologist did not call it on the read, there is signal seen at the base of the fifth metatarsal consistent with a nondisplaced avulsion fracture.  X-rays do show a slight abnormality at the base of the fifth metatarsal consistent with a nondisplaced fracture.  The patient will continue to protect the fracture with a hard sole shoe.  She can come out of the boot and used a hard sole shoe, weight-bear as tolerated.  In 2 weeks when she starts to feel better, she can discontinue the boot and go into a regular sneaker.  I did explain to the patient and her daughters that this injury can take 6 to 8 weeks to heal completely.  We can see the patient back in office in 2 weeks just to make sure she is going in the right direction.  If the pain subsides, she can return to office as needed.  All questions answered.

## 2024-06-06 NOTE — HISTORY OF PRESENT ILLNESS
[FreeTextEntry1] : The patient is a 59-year-old female who presents with a left foot base of fifth metatarsal fracture which she sustained 2 weeks ago after she twisted her foot.  She went to another medical provider who ordered her an MRI of the left foot which she has today in office to be reviewed showing the base of fifth metatarsal fracture.  She presents wearing a cam walking boot with her daughters in office today.  They are here for a second opinion.  Pain is controlled.  She is walking without assistance with the boot.  No other complaints.

## 2024-06-06 NOTE — PHYSICAL EXAM
[de-identified] : Left foot Physical Examination:  General: Alert and oriented x3.  In no acute distress.  Pleasant in nature with a normal affect.  No apparent respiratory distress.  Erythema, Warmth, Rubor: Negative Swelling: Mild swelling of the foot laterally.  ROM Ankle: 1. Dorsiflexion: 10 degrees 2. Plantarflexion: 40 degrees 3. Inversion: 30 degrees 4. Eversion: 20 degrees  ROM of digits: Normal  Pes Planus: Positive Pes Cavus: Negative  Bunion: Negative Tailor's Bunion (Bunionette): Negative Hammer Toe Deformity/Deformities: Negative  Tenderness to Palpation:  1. Heel Pain: Negative 2. Midfoot Pain: Negative 3. First MTP Joint: Negative 4. Lis Franc Joint: Negative  Tenderness Metatarsals: 1st MT: Negative 2nd MT: Negative 3rd MT: Negative 4th MT: Negative 5th MT: Negative Base of the 5th MT: Positive  Ligament Pain: 1. Lis Franc Ligament: Negative 2. Plantar Fascia Ligament: Negative  Strength:  5/5 TA/GS/EHL/FHL/EDL/ADD/ABD  Pulses: 2+ DP/PT Pulses  Capillary Refill Toes: <2 seconds  Neuro: Intact motor and sensory throughout  Additional Test: 1. Archer's Squeeze Test: Negative 2. Calcaneal Squeeze Test: Negative [de-identified] : Left foot x-rays reviewed, 3 views total, 6/6/2024: Nondisplaced base of fifth metatarsal fracture.  Old midfoot injury seen with a bony projection coming off the second TMT consistent with an old Lisfranc ligament injury.  MRI of the left foot without contrast performed at [facility] on 5/29/2024 impression: Lisfranc ligament tear with second TMT subluxation and numerous acute avulsion fractures.  Incompletely visualized possible Archer's neuromas in the second/third and third/fourth interspaces.  Dedicated imaging would be useful for further evaluation.

## 2024-06-11 ENCOUNTER — OFFICE (OUTPATIENT)
Dept: URBAN - METROPOLITAN AREA CLINIC 116 | Facility: CLINIC | Age: 59
Setting detail: OPHTHALMOLOGY
End: 2024-06-11
Payer: MEDICARE

## 2024-06-11 ENCOUNTER — RX ONLY (RX ONLY)
Age: 59
End: 2024-06-11

## 2024-06-11 DIAGNOSIS — H16.223: ICD-10-CM

## 2024-06-11 PROBLEM — M06.9 RHEUMATOID ARTHRITIS: Status: ACTIVE | Noted: 2024-06-11

## 2024-06-11 PROCEDURE — 92012 INTRM OPH EXAM EST PATIENT: CPT | Performed by: PHYSICIAN ASSISTANT

## 2024-06-11 ASSESSMENT — CONFRONTATIONAL VISUAL FIELD TEST (CVF)
OD_FINDINGS: FULL
OS_FINDINGS: FULL

## 2024-06-11 ASSESSMENT — LID EXAM ASSESSMENTS
OD_COMMENTS: TATTOO ON SUPERIOR & INFERIOR LIDS
OS_COMMENTS: TATTOO ON SUPERIOR & INFERIOR LIDS

## 2024-06-21 ENCOUNTER — APPOINTMENT (OUTPATIENT)
Dept: ORTHOPEDIC SURGERY | Facility: CLINIC | Age: 59
End: 2024-06-21
Payer: MEDICARE

## 2024-06-21 DIAGNOSIS — S92.352A DISPLACED FRACTURE OF FIFTH METATARSAL BONE, LEFT FOOT, INITIAL ENCOUNTER FOR CLOSED FRACTURE: ICD-10-CM

## 2024-06-21 DIAGNOSIS — M79.672 PAIN IN LEFT FOOT: ICD-10-CM

## 2024-06-21 PROCEDURE — 73630 X-RAY EXAM OF FOOT: CPT | Mod: LT

## 2024-06-21 PROCEDURE — 99213 OFFICE O/P EST LOW 20 MIN: CPT

## 2024-06-21 NOTE — PHYSICAL EXAM
[de-identified] : Left foot Physical Examination:  General: Alert and oriented x3.  In no acute distress.  Pleasant in nature with a normal affect.  No apparent respiratory distress.  Erythema, Warmth, Rubor: Negative Swelling: No swelling present of the foot, improved.  ROM Ankle: 1. Dorsiflexion: 10 degrees 2. Plantarflexion: 40 degrees 3. Inversion: 30 degrees 4. Eversion: 20 degrees  ROM of digits: Normal  Pes Planus: Positive Pes Cavus: Negative  Bunion: Negative Tailor's Bunion (Bunionette): Negative Hammer Toe Deformity/Deformities: Negative  Tenderness to Palpation:  1. Heel Pain: Negative 2. Midfoot Pain: Patient has tenderness to palpation in the midfoot, first TMTJ with a bone spur felt in arch of the foot. 3. First MTP Joint: Negative 4. Lis Franc Joint: Negative  Tenderness Metatarsals: 1st MT: Negative 2nd MT: Negative 3rd MT: Negative 4th MT: Negative 5th MT: Negative Base of the 5th MT: Positive, improved.  Ligament Pain: 1. Lis Franc Ligament: Negative 2. Plantar Fascia Ligament: Negative  Strength:  5/5 TA/GS/EHL/FHL/EDL/ADD/ABD  Pulses: 2+ DP/PT Pulses  Capillary Refill Toes: <2 seconds  Neuro: Intact motor and sensory throughout  Additional Test: 1. Archer's Squeeze Test: Negative 2. Calcaneal Squeeze Test: Negative [de-identified] : Left foot x-rays reviewed, 3 views total, 6/21/2024: Mild midfoot arthritic changes seen.  Base of fifth metatarsal fracture nondisplaced very good healing noted.  Can barely see the fracture on x-ray in all 3 views.

## 2024-06-21 NOTE — DISCUSSION/SUMMARY
[de-identified] : [facility] security to the terminal.  She can now transition out of the hard soled shoe and go into a regular, comfortable supportive sneaker, avoid walking barefoot for another month while protecting the foot.  X-rays discussed with the patient and reviewed.  Fracture is stable and healing very well.  I did explain to her and reiterated that it takes 4 weeks for the fracture to heal completely and I want her to go easy on the foot until then.  If anything changes, pain increases, the patient return to office for an x-ray.  All of her questions were answered.  The patient can follow-up on an as-needed basis.  [place] is leaving the country.     The patient understood and verbally agreed to the treatment plan. All of their questions were answered, and they were satisfied with the visit. The patient should call the office if they have any questions or experience worsening symptoms.

## 2024-06-21 NOTE — HISTORY OF PRESENT ILLNESS
[FreeTextEntry1] : 06/21/2024: PRASHANT MASON is a 59-year-old female presenting to the office for a follow up evaluation of a left foot, base of fifth metatarsal fracture.  Patient in the medical grade hard soled shoe, walking without assistance.  The patient states that she has more pain in the arch of her foot and over the fracture site.  She is feeling much better.  She presents with her daughter who is helping with translation.  No other complaints.  06/06/2024: The patient is a 59-year-old female who presents with a left foot base of fifth metatarsal fracture which she sustained 2 weeks ago after she twisted her foot.  She went to another medical provider who ordered her an MRI of the left foot which she has today in office to be reviewed showing the base of fifth metatarsal fracture.  She presents wearing a cam walking boot with her daughters in office today.  They are here for a second opinion.  Pain is controlled.  She is walking without assistance with the boot.  No other complaints.

## 2024-06-24 ENCOUNTER — APPOINTMENT (OUTPATIENT)
Dept: INTERNAL MEDICINE | Facility: CLINIC | Age: 59
End: 2024-06-24
Payer: MEDICARE

## 2024-06-24 VITALS
HEIGHT: 62 IN | BODY MASS INDEX: 26.5 KG/M2 | RESPIRATION RATE: 12 BRPM | SYSTOLIC BLOOD PRESSURE: 120 MMHG | HEART RATE: 78 BPM | DIASTOLIC BLOOD PRESSURE: 78 MMHG | WEIGHT: 144 LBS

## 2024-06-24 DIAGNOSIS — M62.838 OTHER MUSCLE SPASM: ICD-10-CM

## 2024-06-24 DIAGNOSIS — M35.00 SICCA SYNDROME, UNSPECIFIED: ICD-10-CM

## 2024-06-24 DIAGNOSIS — J30.1 ALLERGIC RHINITIS DUE TO POLLEN: ICD-10-CM

## 2024-06-24 DIAGNOSIS — J84.9 INTERSTITIAL PULMONARY DISEASE, UNSPECIFIED: ICD-10-CM

## 2024-06-24 PROCEDURE — 99214 OFFICE O/P EST MOD 30 MIN: CPT

## 2024-06-24 PROCEDURE — G2211 COMPLEX E/M VISIT ADD ON: CPT

## 2024-06-24 RX ORDER — FLUTICASONE PROPIONATE 50 UG/1
50 SPRAY, METERED NASAL DAILY
Qty: 1 | Refills: 4 | Status: ACTIVE | COMMUNITY
Start: 2024-06-24 | End: 1900-01-01

## 2024-06-24 RX ORDER — DICLOFENAC SODIUM 1% 10 MG/G
1 GEL TOPICAL
Qty: 3 | Refills: 3 | Status: ACTIVE | COMMUNITY
Start: 2023-10-09 | End: 1900-01-01

## 2024-07-09 ENCOUNTER — APPOINTMENT (OUTPATIENT)
Dept: PULMONOLOGY | Facility: CLINIC | Age: 59
End: 2024-07-09
Payer: MEDICARE

## 2024-07-09 VITALS — WEIGHT: 148 LBS | HEIGHT: 62 IN | BODY MASS INDEX: 27.23 KG/M2

## 2024-07-09 VITALS
DIASTOLIC BLOOD PRESSURE: 88 MMHG | RESPIRATION RATE: 16 BRPM | HEART RATE: 88 BPM | SYSTOLIC BLOOD PRESSURE: 130 MMHG | OXYGEN SATURATION: 98 %

## 2024-07-09 PROCEDURE — G2211 COMPLEX E/M VISIT ADD ON: CPT

## 2024-07-09 PROCEDURE — 99214 OFFICE O/P EST MOD 30 MIN: CPT

## 2024-09-04 ENCOUNTER — APPOINTMENT (OUTPATIENT)
Dept: RHEUMATOLOGY | Facility: CLINIC | Age: 59
End: 2024-09-04
Payer: MEDICARE

## 2024-09-04 VITALS
HEART RATE: 83 BPM | OXYGEN SATURATION: 99 % | SYSTOLIC BLOOD PRESSURE: 132 MMHG | DIASTOLIC BLOOD PRESSURE: 84 MMHG | HEIGHT: 62 IN | TEMPERATURE: 97.2 F

## 2024-09-04 DIAGNOSIS — M35.00 SICCA SYNDROME, UNSPECIFIED: ICD-10-CM

## 2024-09-04 DIAGNOSIS — Z79.899 OTHER LONG TERM (CURRENT) DRUG THERAPY: ICD-10-CM

## 2024-09-04 DIAGNOSIS — J84.9 INTERSTITIAL PULMONARY DISEASE, UNSPECIFIED: ICD-10-CM

## 2024-09-04 LAB
BASOPHILS # BLD AUTO: 0.07 K/UL
BASOPHILS NFR BLD AUTO: 0.9 %
EOSINOPHIL # BLD AUTO: 0.17 K/UL
EOSINOPHIL NFR BLD AUTO: 2.3 %
ERYTHROCYTE [SEDIMENTATION RATE] IN BLOOD BY WESTERGREN METHOD: 43 MM/HR
HCT VFR BLD CALC: 42.6 %
HGB BLD-MCNC: 13.4 G/DL
IMM GRANULOCYTES NFR BLD AUTO: 0.5 %
LYMPHOCYTES # BLD AUTO: 2.34 K/UL
LYMPHOCYTES NFR BLD AUTO: 31.4 %
MAN DIFF?: NORMAL
MCHC RBC-ENTMCNC: 29.1 PG
MCHC RBC-ENTMCNC: 31.5 GM/DL
MCV RBC AUTO: 92.4 FL
MONOCYTES # BLD AUTO: 0.52 K/UL
MONOCYTES NFR BLD AUTO: 7 %
NEUTROPHILS # BLD AUTO: 4.31 K/UL
NEUTROPHILS NFR BLD AUTO: 57.9 %
PLATELET # BLD AUTO: 275 K/UL
RBC # BLD: 4.61 M/UL
RBC # FLD: 13.5 %
WBC # FLD AUTO: 7.45 K/UL

## 2024-09-04 PROCEDURE — 99214 OFFICE O/P EST MOD 30 MIN: CPT

## 2024-09-04 RX ORDER — UBIDECARENONE/VIT E ACET 100MG-5
50 MCG CAPSULE ORAL
Qty: 30 | Refills: 11 | Status: ACTIVE | COMMUNITY
Start: 2024-09-04 | End: 1900-01-01

## 2024-09-04 NOTE — ASSESSMENT
[FreeTextEntry1] : 59y F with Sjogren's with ILD. History of ?weak dx of SLE in the past not on treatment (likely reported +CURLY history) p/w new diagnosis of Sjogren's w/ ILD w/ bilateral GGO on CT imaging and restrictive PFT pattern. She has been on MMF with good disease control currently. Recent CT chest 12/2021 w/ tree-in-bud opacities, likely related to aspiration. Reports she was vaccinated with booster prior to her CT chest. Planned uterine surgery 3/2022- would recommend pulm evaluation and tapering of steroids if possible, average daily dose 5mg/d at this time is low-risk and could remain. Would need DEXA screening for steroid use at next visit. Remains on prednisone 5mg qod, tapering currently as per pulm Dr. Lopez. Last DEXA on 1/9/2023 showed AP spine (L1-L4) -0.4 and LFN -2.0, indicating osteopenia. Instructed pt to start Ca and VitD supplementation daily. Low D3 supplement on vit D3.  - Previous labs reviewed - inflamm and disease stable - High risk drug monitoring drawn in office today - CBC/CMP needed for MMF. Disease monitoring w/ CBC, CMP, ESR, CRP, etc.  - c/w MMF 1g BID BID. - renewed  - c/w Pred 1mg every 2 days (Rx pulm) Recommend tapering if OK with pulm, currently no evidence of disease activity and respiratory symptoms stable - c/w Calcium 600mg BID w/ meals. c/w Vitamin D 1000 UT daily.  Refills sent.  RTO 3-4 months.

## 2024-09-04 NOTE — PHYSICAL EXAM
[General Appearance - Alert] : alert [General Appearance - In No Acute Distress] : in no acute distress [Sclera] : the sclera and conjunctiva were normal [PERRL With Normal Accommodation] : pupils were equal in size, round, and reactive to light [Extraocular Movements] : extraocular movements were intact [Outer Ear] : the ears and nose were normal in appearance [Hearing Threshold Finger Rub Not [place]] : hearing was normal [Examination Of The Oral Cavity] : the lips and gums were normal [Nasal Cavity] : the nasal mucosa and septum were normal [Oropharynx] : the oropharynx was normal [Neck Appearance] : the appearance of the neck was normal [Neck Cervical Mass (___cm)] : no neck mass was observed [Jugular Venous Distention Increased] : there was no jugular-venous distention [Thyroid Diffuse Enlargement] : the thyroid was not enlarged [Thyroid Nodule] : there were no palpable thyroid nodules [Respiration, Rhythm And Depth] : normal respiratory rhythm and effort [Auscultation Breath Sounds / Voice Sounds] : lungs were clear to auscultation bilaterally [Heart Rate And Rhythm] : heart rate was normal and rhythm regular [Heart Sounds] : normal S1 and S2 [Heart Sounds Gallop] : no gallops [Murmurs] : no murmurs [Heart Sounds Pericardial Friction Rub] : no pericardial rub [Full Pulse] : the pedal pulses are present [Edema] : there was no peripheral edema [Bowel Sounds] : normal bowel sounds [Abdomen Soft] : soft [Abdomen Tenderness] : non-tender [Abdomen Mass (___ Cm)] : no abdominal mass palpated [Cervical Lymph Nodes Enlarged Posterior Bilaterally] : posterior cervical [Cervical Lymph Nodes Enlarged Anterior Bilaterally] : anterior cervical [Supraclavicular Lymph Nodes Enlarged Bilaterally] : supraclavicular [No CVA Tenderness] : no ~M costovertebral angle tenderness [No Spinal Tenderness] : no spinal tenderness [Abnormal Walk] : normal gait [Nail Clubbing] : no clubbing  or cyanosis of the fingernails [Musculoskeletal - Swelling] : no joint swelling seen [Motor Tone] : muscle strength and tone were normal [FreeTextEntry1] : Hands- normal Wrists- normal Elbows- normal Shoulders- normal Hips- normal Knees- Patellofemoral crepitus bilaterally without effusion.  Ankles- normal Feet- normal MMT 10/10 proximally/distally bilaterally  [Skin Color & Pigmentation] : normal skin color and pigmentation [Skin Turgor] : normal skin turgor [] : no rash [Skin Lesions] : no skin lesions [Deep Tendon Reflexes (DTR)] : deep tendon reflexes were 2+ and symmetric [Sensation] : the sensory exam was normal to light touch and pinprick [Motor Exam] : the motor exam was normal [No Focal Deficits] : no focal deficits [Oriented To Time, Place, And Person] : oriented to person, place, and time [Impaired Insight] : insight and judgment were intact [Affect] : the affect was normal [Mood] : the mood was normal

## 2024-09-04 NOTE — HISTORY OF PRESENT ILLNESS
[___ Month(s) Ago] : [unfilled] month(s) ago [FreeTextEntry1] : - Pt overall doing well, no changes in breathing. Occasional chest tightness but denies any cough, CP, SOB or CALVIN. Had recent pulm visit w/ updated CT chest. Currently on Prednisone 1mg every 2 days via pulm and using Breo and Trelegy inhalers daily.  - PFTs remain unchanged, testing in November as per pt - Remains on MMF 1g BID. Takes Omeprazole 40mg/d for GERD.  - last labs w/o inflammation and normal CBC and CMP - Will check labs today for inflamm/diseasae monitoring and remain on current tx regimen as discussed.  - ROS remains unchanged otherwise.

## 2024-09-05 LAB
ALBUMIN SERPL ELPH-MCNC: 4.6 G/DL
ALP BLD-CCNC: 131 U/L
ALT SERPL-CCNC: 30 U/L
ANION GAP SERPL CALC-SCNC: 13 MMOL/L
AST SERPL-CCNC: 26 U/L
BILIRUB SERPL-MCNC: 0.3 MG/DL
BUN SERPL-MCNC: 13 MG/DL
CALCIUM SERPL-MCNC: 10.4 MG/DL
CHLORIDE SERPL-SCNC: 102 MMOL/L
CO2 SERPL-SCNC: 25 MMOL/L
CREAT SERPL-MCNC: 0.82 MG/DL
CRP SERPL-MCNC: <3 MG/L
EGFR: 81 ML/MIN/1.73M2
GLUCOSE SERPL-MCNC: 87 MG/DL
LDH SERPL-CCNC: 269 U/L
POTASSIUM SERPL-SCNC: 4.6 MMOL/L
PROT SERPL-MCNC: 7.6 G/DL
RHEUMATOID FACT SER QL: 23 IU/ML
SODIUM SERPL-SCNC: 140 MMOL/L

## 2024-09-07 LAB
ALBUMIN MFR SERPL ELPH: 55.3 %
ALBUMIN SERPL-MCNC: 4.2 G/DL
ALBUMIN/GLOB SERPL: 1.2 RATIO
ALPHA1 GLOB MFR SERPL ELPH: 3.6 %
ALPHA1 GLOB SERPL ELPH-MCNC: 0.3 G/DL
ALPHA2 GLOB MFR SERPL ELPH: 10.6 %
ALPHA2 GLOB SERPL ELPH-MCNC: 0.8 G/DL
B-GLOBULIN MFR SERPL ELPH: 12.7 %
B-GLOBULIN SERPL ELPH-MCNC: 1 G/DL
DEPRECATED KAPPA LC FREE/LAMBDA SER: 1.13 RATIO
GAMMA GLOB FLD ELPH-MCNC: 1.4 G/DL
GAMMA GLOB MFR SERPL ELPH: 17.8 %
IGA SER QL IEP: 272 MG/DL
IGG SER QL IEP: 1265 MG/DL
IGM SER QL IEP: 124 MG/DL
INTERPRETATION SERPL IEP-IMP: NORMAL
KAPPA LC CSF-MCNC: 2.28 MG/DL
KAPPA LC SERPL-MCNC: 2.58 MG/DL
M PROTEIN MFR SERPL ELPH: 3.2 %
M PROTEIN SPEC IFE-MCNC: NORMAL
MONOCLON BAND OBS SERPL: 0.2 G/DL
PROT SERPL-MCNC: 7.6 G/DL
PROT SERPL-MCNC: 7.6 G/DL

## 2024-09-11 NOTE — H&P PST ADULT - NS PRO FEM REPRO PAP RESULTS
"Physical Therapy Treatment Note    Patient Name: Garrison Vallecillo  MRN Number: [id number]  Evaluating Clinician: EWA Colmenares PT  Today's Date: 9/11/2024  Time Calculation  Start Time: 1310  Stop Time: 1350  Time Calculation (min): 40 min    INSURANCE:  Visit Number: 2  Approved Visits: 16  Insurance Info: Auth Rcvd 15 visits 8/29-12/31 CPTs [id number]  EVAL ONLY - [employer] MCAID / AUTH REQ / 100% COVERAGE / AVAILITY [id number] / ds 8/23/24.     PRECAUTIONS/SPECIAL CONCERNS/RELEVANT PMHX: [place] Syndrome, anxiety disorder, ADHD,     PT CLINICAL PROBLEM: Right knee pain Probable    Chief Medical Dx code: M76.51 (ICD-10-CM) - Patellar tendinitis of right knee S89.91XA (ICD-10-CM) - Right knee injury, initial encounter     SUBJECTIVE  8/9/24 August was sitting with his knee crossed and felt his knee cap catch and he then straightened his knee suddenly and felt some pain. Went to ER placed in immobilizer and provided crutches. Later consulted ortho Lynsey Campbell PAC and referred to PT. At onset states his knee was swollen but denies any bruising. Has not used crutches at all. States he also declined pain meds. He is improving and feels much better at this time and feels 90% improved his main concern is \"not having this happen again\" He states sometimes he has felt like his knee cap is going to slip out. Denies past history of patellar dislocation or traumatic knee injury.     9-11-24  R knee feels like its \"popping\" but it doesn't hurt    TREATMENT:  Symptoms/NPRS before Rx: 0  Symptoms/NPRS after Rx: 0  Timed Codes: (# minutes per modality and 0 if omitted)  Ther-Ex 77657:  40 min  Manual 19229:  NMRe-ed 12359:  Gait 60150:  Stim 50492:  Traction 85596:  Self Man: 43774:      9/11/24:  THERA EX x 40 min  Bike x 4 min  Seated LE exercise for strengthening   LAQ 1 x10 with 2 #  Marching 1 x10 2 #  Abduction with GTB theraband  1 x10  ball squeeze  1 x10  ankle df/pf  1 x10   Sit to stand " [address] 
[phone] Patient: Jerrell Kulkarni MRN: [id number] TLW:5/79/0307 You are allergic to the following No active allergies Recent Documentation OB Status Smoking Status Having regular periods Never Smoker Emergency Contacts Name Discharge Info Relation Home Work Mobile [address] CAREGIVER [3] Parent [1] [id number] About your hospitalization You were admitted on:  March 21, 2017 You last received care in the:  [facility] ENDOSCOPY You were discharged on:  March 21, 2017 Unit phone number:  [phone] Why you were hospitalized Your primary diagnosis was:  Not on File Providers Seen During Your Hospitalizations Provider Role Specialty Primary office phone Rosita Moore MD Attending Provider [facility] [phone] Your Primary Care Physician (PCP) Primary Care Physician Office Phone Office Fax Jessica Dietrich [phone] Follow-up Information Follow up With Details Comments Contact Info Chaparro Wiley III, MD   [address] [place] [address] and [place] ''  Jose Daniel Pope [phone] Current Discharge Medication List  
  
START taking these medications Dose & Instructions Dispensing Information Comments Morning Noon Evening Bedtime  
 omeprazole 40 mg capsule Commonly known as:  PRILOSEC Your last dose was: Your next dose is:    
   
   
 Dose:  40 mg Take 1 Cap by mouth daily. Quantity:  30 Cap Refills:  11 CONTINUE these medications which have NOT CHANGED Dose & Instructions Dispensing Information Comments Morning Noon Evening Bedtime  
 cyproheptadine 4 mg tablet Commonly known as:  PERIACTIN Your last dose was: 10 x 1  Standing:  with UE support for strengthening:  Hip flexion/marching   1 x10 with 2 #  Hip extension 1 x10 with 2 #  Hip abduction 1 x10 with 2 #  Hamstring curl 1 x10 with 2 #  Small squat 1 x10   Heel raise  1 x10  Toe raise1 x10  Supine exercise for LE strengthening:  +SLR 1 x 10 with 2 #  +Heel slide 1 x10 with 2 #  +Hip abduction 1 x10 with 2 #  +SAQ 1 x10 with 2 #  + Quad Set 1 x10 with 3 second hold   Leg ext # 10 wt, 2 x 10  H.S. curl # 10 wt 2 x 10  Total gym level 14 knee flex 3 x 10  Inside parallel bars: YTB  around ankles side stepping and monster walks  Toe taps on 6 inch block  Mini squats  Toe raises on slant part of parallel bars   Slant board stretch         8/26/24: EDU patellar hypermobility and subluxation and knee cap less stable at terminal extension and more stable in flexion > 40 degrees needs to restore knee motion and strength and avoid sudden extension with valgus motion to manage and other options patella sleeve  QS 10x  SAQ 10x      OBJECTIVE:  STEADI Fall Risk: 0 (score of 4+ indicates fall risk)   OUTCOME TOOL: LEFS 22/80  8/26/24  Rom ext -12 actively and full passively  Flex 120 limited by apprehension  Patella mobility restraint good  No swelling   No deformity  Non antalgic gait  Quad set 25%  Quad 4-  Hams 4/5  Not able to SLR cannot hold knee in extension      ASSESSMENT: Performed ex well weakness noted  B quad R > L, No c/o discomfort with ex just fatigue      8/26/24 Most likely right patellar subluxation episode and presents today guarded and concerned that this may happen again. Lacks knee flexion by 25 degrees due to discomfort and 5-10 degrees of active knee extension. Central patella. Min crepitation. Has passive restraint to lateral glide. No swelling. Some atrophy. Needs rehab for full recovery. Improved understanding that valgus motion with knee in last 30 degrees of extension is most risky position for subluxation and more aware of knee mechanics     PLAN: PT  Your next dose is:    
   
   
 Dose:  4 mg Take 1 Tab by mouth nightly for 30 days. Quantity:  30 Tab Refills:  3 MULTIVITAMIN PO Your last dose was: Your next dose is: Take  by mouth. For women. Takes one po once daily. Refills:  0 Where to Get Your Medications These medications were sent to [facility], [address] [place] [address]  [facility], [address] [place] [id number] Phone:  [phone]  
  omeprazole 40 mg capsule Discharge Instructions [address] 
[address] [facility] [address] [place], [address] 
[phone] Colin Angel [phone] 
2000 EGD DISCHARGE INSTRUCTIONS Discomfort: 
Sore throat- throat lozenges or warm salt water gargle 
redness at IV site- apply warm compress to area; if redness or soreness persist- contact your physician Gaseous discomfort- walking, belching will help relieve any discomfort You may not operate a vehicle for 12 hours DIET Regular diet. MEDICATIONS: 
Resume home medications ACTIVITY Spend the remainder of the day resting -  avoid any strenuous activity. May resume normal activities tomorrow. CALL M.D. ANY SIGN of: Increasing pain, nausea, vomiting Abdominal distension (swelling) Fever or chills Pain in chest area Follow-up Instructions: 
Call [facility] for any questions or problems. Telephone # [phone] Discharge Orders None Introducing [facility]! Dear Parent or Guardian, Thank you for requesting a [employer] account for your child. With [employer], you can view your childs hospital or ER discharge instructions, current allergies, immunizations and much more. In order to access your childs information, we require a signed consent on file.   Please see the [facility] department or call [phone] for 1/week for right patellar stability and knee program to restore extension and flexion rom quad strength knee control and function     Patient/parent/caregiver agreed and consented to plan of care for skilled physical therapy at 1 times per week for 6-12 weeks. Physical Therapy to include modalities prn as indicated, therapeutic exercise, manual therapy, neuromuscular re-education, gait and functional performance training, instruction and practice in a home exercise program (HEP) and self-management skills.     CARE PLAN/GOALS: (met, progressing, not progressing)    STG's: (      weeks /      visits )  1  2    LTG's  (      weeks /      visits )  1  2  3  4  5 The Global Rating of Change Score (GROC) will improve to 5/7 =  “a good deal better” or more.   6 Improve functional outcome tool score (FOTS: GIL, NDI, ASES, ABC, etc.) by > 10 points for Minimally Important Clinical Difference (MICD).  7 Patient/client will be independent with a HEP and self-management skills to further enhance recovery and progress.  8 Patient/client will verbalize >75% symptom reduction for frequency and severity of symptoms and/or > two point reduction of VAS/NPRS.  9 The Patient Specific Functional Scale metric (PSFS) will improve from baseline value to greater than 80% functional.    instructions on completing a MyChart Proxy request.   
Additional Information If you have questions, please visit the Frequently Asked Questions section of the [facility] [employer] website at [email or web address] Remember, MyChart is NOT to be used for urgent needs. For medical emergencies, dial 911. Now available from your iPhone and Android! General Information Please provide this summary of care documentation to your next provider. Patient Signature:  ____________________________________________________________ Date:  ____________________________________________________________  
  
Mayte Lester Provider Signature:  ____________________________________________________________ Date:  ____________________________________________________________ normal

## 2024-09-17 ENCOUNTER — APPOINTMENT (OUTPATIENT)
Dept: SURGERY | Facility: CLINIC | Age: 59
End: 2024-09-17
Payer: MEDICARE

## 2024-09-17 DIAGNOSIS — K13.79 OTHER LESIONS OF ORAL MUCOSA: ICD-10-CM

## 2024-09-17 PROCEDURE — 99212 OFFICE O/P EST SF 10 MIN: CPT

## 2024-09-17 NOTE — PHYSICAL EXAM
[Midline] : located in midline position [Normal] : orientation to person, place, and time: normal [de-identified] : Left cheek well healed scar

## 2024-09-24 ENCOUNTER — OFFICE (OUTPATIENT)
Dept: URBAN - METROPOLITAN AREA CLINIC 116 | Facility: CLINIC | Age: 59
Setting detail: OPHTHALMOLOGY
End: 2024-09-24
Payer: MEDICARE

## 2024-09-24 ENCOUNTER — APPOINTMENT (OUTPATIENT)
Dept: NEUROLOGY | Facility: CLINIC | Age: 59
End: 2024-09-24
Payer: MEDICARE

## 2024-09-24 VITALS
BODY MASS INDEX: 27.23 KG/M2 | SYSTOLIC BLOOD PRESSURE: 130 MMHG | HEIGHT: 62 IN | HEART RATE: 95 BPM | WEIGHT: 148 LBS | DIASTOLIC BLOOD PRESSURE: 84 MMHG

## 2024-09-24 DIAGNOSIS — G44.89 OTHER HEADACHE SYNDROME: ICD-10-CM

## 2024-09-24 DIAGNOSIS — M06.9: ICD-10-CM

## 2024-09-24 DIAGNOSIS — H26.493: ICD-10-CM

## 2024-09-24 DIAGNOSIS — H16.223: ICD-10-CM

## 2024-09-24 DIAGNOSIS — Z96.1: ICD-10-CM

## 2024-09-24 PROCEDURE — 92012 INTRM OPH EXAM EST PATIENT: CPT | Performed by: PHYSICIAN ASSISTANT

## 2024-09-24 PROCEDURE — G2211 COMPLEX E/M VISIT ADD ON: CPT

## 2024-09-24 PROCEDURE — 99213 OFFICE O/P EST LOW 20 MIN: CPT

## 2024-09-24 ASSESSMENT — LID EXAM ASSESSMENTS
OS_COMMENTS: TATTOO ON SUPERIOR & INFERIOR LIDS
OD_COMMENTS: TATTOO ON SUPERIOR & INFERIOR LIDS

## 2024-09-24 ASSESSMENT — CONFRONTATIONAL VISUAL FIELD TEST (CVF)
OS_FINDINGS: FULL
OD_FINDINGS: FULL

## 2024-09-24 NOTE — ASSESSMENT
[FreeTextEntry1] : This is a 59-year-old woman with history of chronic headache that has exacerbated in November 2023. Imaging has been negative.  Sedimentation rate and C-reactive protein are unremarkable.  (This has been followed by her rheumatologist due to the Sjogren's. There is nothing to suggest temporal arteritis.  I had started her on amitriptyline for headache prophylaxis. I will resume the same dose.  I will see her back in 6 months. I advised her to call if she requires refills prior to her next visit.

## 2024-09-24 NOTE — DATA REVIEWED
[de-identified] : CT scan of the head was performed on 11/12/2023. The study was unremarkable. An incidental left frontal scalp lipoma was noted.  Blood test from January 2024 demonstrated:  Sedimentation rate of 39, and C-reactive protein of less than 3.

## 2024-09-24 NOTE — HISTORY OF PRESENT ILLNESS
[FreeTextEntry1] : I saw this patient in the office today.  As you recall, she described headaches for many years. This became worse in November 2023. She now has mild daily headache but reports that about once per week it becomes severe. She reports that she takes Tylenol a few times per week, which improves the headache.  9/24/2024 visit: I had started her on amitriptyline for headache prophylaxis. She reports that the medication improved the headaches, but she stopped it when she ran out.

## 2024-09-28 NOTE — H&P PST ADULT - TERM DELIVERIES, OB PROFILE
HPI   Chief Complaint   Patient presents with    Chest Pain    Headache       86-year-old female with past medical history of heart failure, A-fib on aspirin, CKD, COPD on 2 to 3 L nasal cannula at baseline presenting with shortness of breath, chest pain and tachycardia.  Patient was admitted to this hospital between September 12 and 15 for heart failure exacerbation.  Said over last few days she started getting more short of breath.  Shortness breath with exertion.  Also had intermittent chest pressure today.  No diaphoresis or vomiting with it.  No worsening pain with exertion.  No worsening leg swelling recently.  No fever cough or cold symptoms.  Patient also says she has had a frontal headache today.  Came on progressively.  No vision changes weakness or numbness to extremities.  No neck pain or fever.              Patient History   Past Medical History:   Diagnosis Date    Anxiety and depression     Bowel perforation (Multi)     Perforation of sigmoid colon due to diverticulitis    Chronic atrial fibrillation, unspecified (Multi) 05/09/2023    Chronic heart failure with preserved ejection fraction (Multi) 02/07/2024    CKD (chronic kidney disease)     COPD (chronic obstructive pulmonary disease) (Multi)     Essential (primary) hypertension     GIB (gastrointestinal bleeding)     recurrent    History of non-ST elevation myocardial infarction (NSTEMI) 10/08/2023    Hypothyroidism     CANDELARIA (iron deficiency anemia)     NSVT (nonsustained ventricular tachycardia) (Multi)     on amiodarone    Raynaud disease     Sick sinus syndrome (Multi) 02/27/2018    Sleep apnea     Stroke (cerebrum) (Multi)      Past Surgical History:   Procedure Laterality Date    APPENDECTOMY      CARDIAC ELECTROPHYSIOLOGY PROCEDURE N/A 06/17/2024    pacer dependent due to previous AV node ablation. PPM UPGRADE DUAL TO BIV;  Surgeon: Balta Chaudhari MD;  Location: [facility] Cardiac Cath Lab;  CRTP upgrade, [facility], [facility] [address]    CATARACT EXTRACTION      CHOLECYSTECTOMY      COLECTOMY PARTIAL / TOTAL      Sigmoid    HYSTERECTOMY      OTHER SURGICAL HISTORY      Atrial appendage closure    OTHER SURGICAL HISTORY      Catheter Ablation Atrioventricular Node    TONSILLECTOMY       Family History   Problem Relation Name Age of Onset    Coronary artery disease Mother      Coronary artery disease Father       Social History     Tobacco Use    Smoking status: Former     Current packs/day: 0.00     Average packs/day: 0.3 packs/day for 34.0 years (8.5 ttl pk-yrs)     Types: Cigarettes     Start date:      Quit date:      Years since quittin.7     Passive exposure: Never    Smokeless tobacco: Never   Substance Use Topics    Alcohol use: Never    Drug use: Never       Physical Exam   ED Triage Vitals [24 1653]   Temperature Heart Rate Respirations BP   36.8 °C (98.2 °F) 66 16 123/57      Pulse Ox Temp Source Heart Rate Source Patient Position   (!) 93 % Temporal -- --      BP Location FiO2 (%)     -- --       Physical Exam  Vitals and nursing note reviewed.   Constitutional:       General: She is not in acute distress.     Appearance: She is well-developed.   HENT:      Head: Normocephalic and atraumatic.   Eyes:      Conjunctiva/sclera: Conjunctivae normal.   Cardiovascular:      Rate and Rhythm: Normal rate and regular rhythm.      Pulses:           Radial pulses are 2+ on the right side and 2+ on the left side.        Dorsalis pedis pulses are 2+ on the right side and 2+ on the left side.        Posterior tibial pulses are 2+ on the right side and 2+ on the left side.      Heart sounds: No murmur heard.  Pulmonary:      Effort: Pulmonary effort is normal. No respiratory distress.      Breath sounds: Examination of the right-lower field reveals rales. Examination of the left-lower field reveals rales. Rales present.   Abdominal:      Palpations: Abdomen is soft.      Tenderness: There is no abdominal tenderness.    Musculoskeletal:         General: No swelling.      Cervical back: Neck supple.      Right lower leg: No tenderness. Edema present.      Left lower leg: No tenderness. Edema present.   Skin:     General: Skin is warm and dry.      Capillary Refill: Capillary refill takes less than 2 seconds.   Neurological:      Mental Status: She is alert.   Psychiatric:         Mood and Affect: Mood normal.           ED Course & MDM   ED Course as of 09/28/24 2102   Sat Sep 28, 2024   1814 EKG shows paced rhythm.  Negative Sgarbossa criteria. [RS]      ED Course User Index  [RS] Amari Kelly DO         Diagnoses as of 09/28/24 2102   Acute on chronic congestive heart failure, unspecified heart failure type (Multi)                 No data recorded     Kim Coma Scale Score: 15 (09/28/24 1701 : Jenniffer Wade RN)                           Medical Decision Making  HISTORIAN:  Patient    CHART REVIEW:  No pertinent findings    PT SUMMARY:  86-year-old female presents ED with shortness of breath.,  Headache.  Vital signs stable.    DDX:  ACS, PE, PNA, COPD, CHF, Anema, Pericardial effusion, Asthma      PLAN:  Obtain CBC, BMP, EKG, troponin, BNP, ultrasound lower extremities, chest x-ray    DISPO/RE-EVAL:  Labs significant for mildly elevated BNP compared to baseline at 17 noted up from 1500.  Otherwise patient's labs are similar to patient's baseline labs.  Chest x-ray shows signs of pulmonary edema consistent with CHF.  Ultrasounds negative.  Patient received IV Lasix.  On reevaluation she feels her shortness of breath is improved.  Able to ambulate without any respiratory distress.  She is on her stable home oxygen settings.  I offered her admission for CHF however she declines and would like to be discharged home.  Recommend follow-up with her cardiologist soon as possible.  Told to come back to the ED for any new or worsening symptoms.          Procedure  Procedures     Amari Kelly DO  09/28/24 2103     3

## 2024-10-02 ENCOUNTER — APPOINTMENT (OUTPATIENT)
Dept: INTERNAL MEDICINE | Facility: CLINIC | Age: 59
End: 2024-10-02
Payer: MEDICARE

## 2024-10-02 VITALS — HEIGHT: 62 IN | TEMPERATURE: 98.1 F | BODY MASS INDEX: 26.87 KG/M2 | WEIGHT: 146 LBS

## 2024-10-02 VITALS — DIASTOLIC BLOOD PRESSURE: 83 MMHG | SYSTOLIC BLOOD PRESSURE: 128 MMHG

## 2024-10-02 VITALS — BODY MASS INDEX: 26.87 KG/M2 | WEIGHT: 146 LBS | HEIGHT: 62 IN

## 2024-10-02 DIAGNOSIS — J06.9 ACUTE UPPER RESPIRATORY INFECTION, UNSPECIFIED: ICD-10-CM

## 2024-10-02 DIAGNOSIS — R09.89 OTHER SPECIFIED SYMPTOMS AND SIGNS INVOLVING THE CIRCULATORY AND RESPIRATORY SYSTEMS: ICD-10-CM

## 2024-10-02 PROCEDURE — 99213 OFFICE O/P EST LOW 20 MIN: CPT

## 2024-10-02 RX ORDER — BROMPHENIRAMINE MALEATE, PSEUDOEPHEDRINE HYDROCHLORIDE, 2; 30; 10 MG/5ML; MG/5ML; MG/5ML
30-2-10 SYRUP ORAL
Qty: 210 | Refills: 0 | Status: ACTIVE | COMMUNITY
Start: 2024-10-02 | End: 1900-01-01

## 2024-10-02 NOTE — HEALTH RISK ASSESSMENT
[No] : No [No falls in past year] : Patient reported no falls in the past year [Little interest or pleasure doing things] : 1) Little interest or pleasure doing things [Feeling down, depressed, or hopeless] : 2) Feeling down, depressed, or hopeless [0] : 2) Feeling down, depressed, or hopeless: Not at all (0) [PHQ-2 Negative - No further assessment needed] : PHQ-2 Negative - No further assessment needed [[id number]] : 0

## 2024-10-04 LAB
INFLUENZA A RESULT: NOT DETECTED
INFLUENZA B RESULT: DETECTED
RESP SYN VIRUS RESULT: DETECTED
SARS-COV-2 RESULT: NOT DETECTED

## 2024-11-01 ENCOUNTER — OUTPATIENT (OUTPATIENT)
Dept: OUTPATIENT SERVICES | Facility: HOSPITAL | Age: 59
LOS: 1 days | End: 2024-11-01
Payer: COMMERCIAL

## 2024-11-01 ENCOUNTER — APPOINTMENT (OUTPATIENT)
Dept: CT IMAGING | Facility: CLINIC | Age: 59
End: 2024-11-01

## 2024-11-01 DIAGNOSIS — Z98.890 OTHER SPECIFIED POSTPROCEDURAL STATES: Chronic | ICD-10-CM

## 2024-11-01 DIAGNOSIS — J84.9 INTERSTITIAL PULMONARY DISEASE, UNSPECIFIED: ICD-10-CM

## 2024-11-01 DIAGNOSIS — H40.9 UNSPECIFIED GLAUCOMA: Chronic | ICD-10-CM

## 2024-11-01 DIAGNOSIS — N81.10 CYSTOCELE, UNSPECIFIED: Chronic | ICD-10-CM

## 2024-11-01 DIAGNOSIS — Z00.8 ENCOUNTER FOR OTHER GENERAL EXAMINATION: ICD-10-CM

## 2024-11-01 DIAGNOSIS — Z90.710 ACQUIRED ABSENCE OF BOTH CERVIX AND UTERUS: Chronic | ICD-10-CM

## 2024-11-01 DIAGNOSIS — Z98.82 BREAST IMPLANT STATUS: Chronic | ICD-10-CM

## 2024-11-01 PROCEDURE — 71250 CT THORAX DX C-: CPT | Mod: 26

## 2024-11-01 PROCEDURE — 71250 CT THORAX DX C-: CPT

## 2024-11-21 ENCOUNTER — APPOINTMENT (OUTPATIENT)
Dept: INTERNAL MEDICINE | Facility: CLINIC | Age: 59
End: 2024-11-21

## 2024-12-06 ENCOUNTER — RX RENEWAL (OUTPATIENT)
Age: 59
End: 2024-12-06

## 2024-12-10 ENCOUNTER — APPOINTMENT (OUTPATIENT)
Dept: PULMONOLOGY | Facility: CLINIC | Age: 59
End: 2024-12-10
Payer: MEDICARE

## 2024-12-10 VITALS — HEART RATE: 104 BPM | RESPIRATION RATE: 16 BRPM | OXYGEN SATURATION: 96 %

## 2024-12-10 VITALS — HEIGHT: 60.5 IN | BODY MASS INDEX: 31.18 KG/M2 | WEIGHT: 163 LBS

## 2024-12-10 PROCEDURE — 99214 OFFICE O/P EST MOD 30 MIN: CPT | Mod: 25

## 2024-12-10 PROCEDURE — 94729 DIFFUSING CAPACITY: CPT

## 2024-12-10 PROCEDURE — 85018 HEMOGLOBIN: CPT | Mod: QW

## 2024-12-10 PROCEDURE — 94010 BREATHING CAPACITY TEST: CPT

## 2024-12-10 PROCEDURE — 94727 GAS DIL/WSHOT DETER LNG VOL: CPT

## 2024-12-13 NOTE — DISCHARGE NOTE PROVIDER - NSDCQMAMI_CARD_ALL_CORE
Discharge Facility: [facility]   Discharge Diagnosis: Idiopathic Intracranial Hypertension   Admission Date: 12-9-24  Discharge Date: 12-11-24    PCP Appointment Date: Message routed to office for scheduling     Specialist Appointment Date:    12/30/2024 12:45 PM (15 min)  [facility]   NEUROSURGERY POST OP VISIT     Hospital Encounter and Summary Linked: Yes  See discharge assessment below for further details  Two attempts were made to reach patient within two business days after discharge. Voicemail left with contact information for patient to call back with any non-emergent questions or concerns.    
No

## 2024-12-16 ENCOUNTER — APPOINTMENT (OUTPATIENT)
Dept: INTERNAL MEDICINE | Facility: CLINIC | Age: 59
End: 2024-12-16
Payer: MEDICARE

## 2024-12-16 VITALS — RESPIRATION RATE: 12 BRPM | DIASTOLIC BLOOD PRESSURE: 78 MMHG | SYSTOLIC BLOOD PRESSURE: 118 MMHG | HEART RATE: 87 BPM

## 2024-12-16 DIAGNOSIS — R76.8 OTHER SPECIFIED ABNORMAL IMMUNOLOGICAL FINDINGS IN SERUM: ICD-10-CM

## 2024-12-16 DIAGNOSIS — J84.9 INTERSTITIAL PULMONARY DISEASE, UNSPECIFIED: ICD-10-CM

## 2024-12-16 DIAGNOSIS — M35.00 SICCA SYNDROME, UNSPECIFIED: ICD-10-CM

## 2024-12-16 PROCEDURE — G2211 COMPLEX E/M VISIT ADD ON: CPT

## 2024-12-16 PROCEDURE — 99214 OFFICE O/P EST MOD 30 MIN: CPT

## 2025-01-07 ENCOUNTER — EMERGENCY (EMERGENCY)
Facility: HOSPITAL | Age: 60
LOS: 1 days | Discharge: HOME | End: 2025-01-07
Attending: STUDENT IN AN ORGANIZED HEALTH CARE EDUCATION/TRAINING PROGRAM
Payer: MEDICARE

## 2025-01-07 VITALS
TEMPERATURE: 98 F | HEART RATE: 117 BPM | RESPIRATION RATE: 18 BRPM | WEIGHT: 142.64 LBS | DIASTOLIC BLOOD PRESSURE: 77 MMHG | HEIGHT: 63 IN | SYSTOLIC BLOOD PRESSURE: 141 MMHG | OXYGEN SATURATION: 98 %

## 2025-01-07 VITALS
OXYGEN SATURATION: 96 % | TEMPERATURE: 98 F | DIASTOLIC BLOOD PRESSURE: 85 MMHG | SYSTOLIC BLOOD PRESSURE: 153 MMHG | HEART RATE: 84 BPM | RESPIRATION RATE: 18 BRPM

## 2025-01-07 DIAGNOSIS — N81.10 CYSTOCELE, UNSPECIFIED: Chronic | ICD-10-CM

## 2025-01-07 DIAGNOSIS — Z98.890 OTHER SPECIFIED POSTPROCEDURAL STATES: Chronic | ICD-10-CM

## 2025-01-07 DIAGNOSIS — H40.9 UNSPECIFIED GLAUCOMA: Chronic | ICD-10-CM

## 2025-01-07 DIAGNOSIS — Z98.82 BREAST IMPLANT STATUS: Chronic | ICD-10-CM

## 2025-01-07 DIAGNOSIS — Z90.710 ACQUIRED ABSENCE OF BOTH CERVIX AND UTERUS: Chronic | ICD-10-CM

## 2025-01-07 LAB
ALBUMIN SERPL ELPH-MCNC: 4.3 G/DL — SIGNIFICANT CHANGE UP (ref 3.3–5.2)
ALP SERPL-CCNC: 153 U/L — HIGH (ref 40–120)
ALT FLD-CCNC: 26 U/L — SIGNIFICANT CHANGE UP
ANION GAP SERPL CALC-SCNC: 15 MMOL/L — SIGNIFICANT CHANGE UP (ref 5–17)
APPEARANCE UR: CLEAR — SIGNIFICANT CHANGE UP
APTT BLD: 28.1 SEC — SIGNIFICANT CHANGE UP (ref 24.5–35.6)
AST SERPL-CCNC: 33 U/L — HIGH
BACTERIA # UR AUTO: ABNORMAL /HPF
BASOPHILS # BLD AUTO: 0.09 K/UL — SIGNIFICANT CHANGE UP (ref 0–0.2)
BASOPHILS NFR BLD AUTO: 0.8 % — SIGNIFICANT CHANGE UP (ref 0–2)
BILIRUB SERPL-MCNC: 0.3 MG/DL — LOW (ref 0.4–2)
BILIRUB UR-MCNC: NEGATIVE — SIGNIFICANT CHANGE UP
BUN SERPL-MCNC: 12.6 MG/DL — SIGNIFICANT CHANGE UP (ref 8–20)
CALCIUM SERPL-MCNC: 9.5 MG/DL — SIGNIFICANT CHANGE UP (ref 8.4–10.5)
CAST: 0 /LPF — SIGNIFICANT CHANGE UP (ref 0–4)
CHLORIDE SERPL-SCNC: 106 MMOL/L — SIGNIFICANT CHANGE UP (ref 96–108)
CO2 SERPL-SCNC: 20 MMOL/L — LOW (ref 22–29)
COLOR SPEC: YELLOW — SIGNIFICANT CHANGE UP
CREAT SERPL-MCNC: 0.79 MG/DL — SIGNIFICANT CHANGE UP (ref 0.5–1.3)
DIFF PNL FLD: ABNORMAL
EGFR: 85 ML/MIN/1.73M2 — SIGNIFICANT CHANGE UP
EOSINOPHIL # BLD AUTO: 0.11 K/UL — SIGNIFICANT CHANGE UP (ref 0–0.5)
EOSINOPHIL NFR BLD AUTO: 1 % — SIGNIFICANT CHANGE UP (ref 0–6)
GLUCOSE SERPL-MCNC: 95 MG/DL — SIGNIFICANT CHANGE UP (ref 70–99)
GLUCOSE UR QL: NEGATIVE MG/DL — SIGNIFICANT CHANGE UP
HCT VFR BLD CALC: 45.1 % — HIGH (ref 34.5–45)
HGB BLD-MCNC: 14.9 G/DL — SIGNIFICANT CHANGE UP (ref 11.5–15.5)
IMM GRANULOCYTES NFR BLD AUTO: 0.4 % — SIGNIFICANT CHANGE UP (ref 0–0.9)
INR BLD: 0.9 RATIO — SIGNIFICANT CHANGE UP (ref 0.85–1.16)
KETONES UR-MCNC: NEGATIVE MG/DL — SIGNIFICANT CHANGE UP
LEUKOCYTE ESTERASE UR-ACNC: NEGATIVE — SIGNIFICANT CHANGE UP
LYMPHOCYTES # BLD AUTO: 19.4 % — SIGNIFICANT CHANGE UP (ref 13–44)
LYMPHOCYTES # BLD AUTO: 2.16 K/UL — SIGNIFICANT CHANGE UP (ref 1–3.3)
MCHC RBC-ENTMCNC: 29.4 PG — SIGNIFICANT CHANGE UP (ref 27–34)
MCHC RBC-ENTMCNC: 33 G/DL — SIGNIFICANT CHANGE UP (ref 32–36)
MCV RBC AUTO: 89 FL — SIGNIFICANT CHANGE UP (ref 80–100)
MONOCYTES # BLD AUTO: 0.58 K/UL — SIGNIFICANT CHANGE UP (ref 0–0.9)
MONOCYTES NFR BLD AUTO: 5.2 % — SIGNIFICANT CHANGE UP (ref 2–14)
NEUTROPHILS # BLD AUTO: 8.16 K/UL — HIGH (ref 1.8–7.4)
NEUTROPHILS NFR BLD AUTO: 73.2 % — SIGNIFICANT CHANGE UP (ref 43–77)
NITRITE UR-MCNC: POSITIVE
PH UR: 7 — SIGNIFICANT CHANGE UP (ref 5–8)
PLATELET # BLD AUTO: 353 K/UL — SIGNIFICANT CHANGE UP (ref 150–400)
POTASSIUM SERPL-MCNC: 4.5 MMOL/L — SIGNIFICANT CHANGE UP (ref 3.5–5.3)
POTASSIUM SERPL-SCNC: 4.5 MMOL/L — SIGNIFICANT CHANGE UP (ref 3.5–5.3)
PROT SERPL-MCNC: 8 G/DL — SIGNIFICANT CHANGE UP (ref 6.6–8.7)
PROT UR-MCNC: NEGATIVE MG/DL — SIGNIFICANT CHANGE UP
PROTHROM AB SERPL-ACNC: 10.5 SEC — SIGNIFICANT CHANGE UP (ref 9.9–13.4)
RBC # BLD: 5.07 M/UL — SIGNIFICANT CHANGE UP (ref 3.8–5.2)
RBC # FLD: 13.2 % — SIGNIFICANT CHANGE UP (ref 10.3–14.5)
RBC CASTS # UR COMP ASSIST: 1 /HPF — SIGNIFICANT CHANGE UP (ref 0–4)
SODIUM SERPL-SCNC: 141 MMOL/L — SIGNIFICANT CHANGE UP (ref 135–145)
SP GR SPEC: >1.03 — HIGH (ref 1–1.03)
SQUAMOUS # UR AUTO: 1 /HPF — SIGNIFICANT CHANGE UP (ref 0–5)
TROPONIN T, HIGH SENSITIVITY RESULT: 20 NG/L — SIGNIFICANT CHANGE UP (ref 0–51)
UROBILINOGEN FLD QL: 0.2 MG/DL — SIGNIFICANT CHANGE UP (ref 0.2–1)
WBC # BLD: 11.14 K/UL — HIGH (ref 3.8–10.5)
WBC # FLD AUTO: 11.14 K/UL — HIGH (ref 3.8–10.5)
WBC UR QL: 2 /HPF — SIGNIFICANT CHANGE UP (ref 0–5)

## 2025-01-07 PROCEDURE — 93005 ELECTROCARDIOGRAM TRACING: CPT

## 2025-01-07 PROCEDURE — 85730 THROMBOPLASTIN TIME PARTIAL: CPT

## 2025-01-07 PROCEDURE — 0042T: CPT

## 2025-01-07 PROCEDURE — 87086 URINE CULTURE/COLONY COUNT: CPT

## 2025-01-07 PROCEDURE — 36415 COLL VENOUS BLD VENIPUNCTURE: CPT

## 2025-01-07 PROCEDURE — 80053 COMPREHEN METABOLIC PANEL: CPT

## 2025-01-07 PROCEDURE — 0042T: CPT | Mod: MC

## 2025-01-07 PROCEDURE — 70450 CT HEAD/BRAIN W/O DYE: CPT | Mod: MC

## 2025-01-07 PROCEDURE — 99291 CRITICAL CARE FIRST HOUR: CPT | Mod: 25

## 2025-01-07 PROCEDURE — 70498 CT ANGIOGRAPHY NECK: CPT | Mod: MC

## 2025-01-07 PROCEDURE — 84484 ASSAY OF TROPONIN QUANT: CPT

## 2025-01-07 PROCEDURE — 81001 URINALYSIS AUTO W/SCOPE: CPT

## 2025-01-07 PROCEDURE — 93010 ELECTROCARDIOGRAM REPORT: CPT

## 2025-01-07 PROCEDURE — 70450 CT HEAD/BRAIN W/O DYE: CPT | Mod: 26,XU

## 2025-01-07 PROCEDURE — 99291 CRITICAL CARE FIRST HOUR: CPT | Mod: GC

## 2025-01-07 PROCEDURE — 70496 CT ANGIOGRAPHY HEAD: CPT | Mod: 26

## 2025-01-07 PROCEDURE — T1013: CPT

## 2025-01-07 PROCEDURE — 87186 SC STD MICRODIL/AGAR DIL: CPT

## 2025-01-07 PROCEDURE — 85025 COMPLETE CBC W/AUTO DIFF WBC: CPT

## 2025-01-07 PROCEDURE — 70496 CT ANGIOGRAPHY HEAD: CPT | Mod: MC

## 2025-01-07 PROCEDURE — 82962 GLUCOSE BLOOD TEST: CPT

## 2025-01-07 PROCEDURE — 70498 CT ANGIOGRAPHY NECK: CPT | Mod: 26

## 2025-01-07 PROCEDURE — 85610 PROTHROMBIN TIME: CPT

## 2025-01-07 RX ORDER — CEFPODOXIME PROXETIL 100 MG/5ML
1 GRANULE, FOR SUSPENSION ORAL
Qty: 14 | Refills: 0
Start: 2025-01-07 | End: 2025-01-13

## 2025-01-07 RX ORDER — CEFPODOXIME PROXETIL 100 MG/5ML
100 GRANULE, FOR SUSPENSION ORAL EVERY 12 HOURS
Refills: 0 | Status: DISCONTINUED | OUTPATIENT
Start: 2025-01-07 | End: 2025-01-15

## 2025-01-07 RX ORDER — ACETAMINOPHEN 80 MG/.8ML
650 SOLUTION/ DROPS ORAL ONCE
Refills: 0 | Status: COMPLETED | OUTPATIENT
Start: 2025-01-07 | End: 2025-01-07

## 2025-01-07 RX ADMIN — ACETAMINOPHEN 650 MILLIGRAM(S): 80 SOLUTION/ DROPS ORAL at 20:12

## 2025-01-07 RX ADMIN — CEFPODOXIME PROXETIL 100 MILLIGRAM(S): 100 GRANULE, FOR SUSPENSION ORAL at 19:39

## 2025-01-07 NOTE — ED PROVIDER NOTE - CARE PROVIDER_API CALL
Jose Logan  Neurology  [address], [address]  [place] [address]  Phone: [phone]  Fax: [phone]  Follow Up Time: Urgent    Gabrielle Keyes  Rheumatology  [address] [place] [address]  Phone: [phone]  Fax: [phone]  Follow Up Time: Urgent    [facility],   [address], [place] [address]    For therapy and mental health  Phone: [phone]  Fax: (   )    -  Follow Up Time: Urgent

## 2025-01-07 NOTE — ED PROVIDER NOTE - PROVIDER TOKENS
PROVIDER:[TOKEN:[[id number]],FOLLOWUP:[Urgent]],PROVIDER:[TOKEN:[[id number]],FOLLOWUP:[Urgent]],FREE:[LAST:[Family service leauge],PHONE:[phone]],FAX:[(   )    -],ADDRESS:[[address] [place] [address]    For therapy and mental health],FOLLOWUP:[Urgent]]

## 2025-01-07 NOTE — ED PROVIDER NOTE - NSFOLLOWUPCLINICS_GEN_ALL_ED_FT
[facility]  [place] [address]  Phone: [phone]  Fax:   Follow Up Time: Urgent

## 2025-01-07 NOTE — ED PROVIDER NOTE - CLINICAL SUMMARY MEDICAL DECISION MAKING FREE TEXT BOX
59 year old female pt presented tot he ED for confusion, memory loss with last known well at 630 AM. No neuro deficits. Ct head/stroke scans were negative. Pt a & o x 4 in the ED, answering questions appropriately. Labs unremarkable, ua shows + uti. Started on vantin. Given symptoms, no neuro deficits, and + uti, symptoms likely secondary to delirium from Uti rather than neurological source. Pt also with recent traumatic loss of brother, may be having grief response to this. No SI/HI. had at length discussion with family and pt. Low suspicion for neuro cause at this time. Vantin sent to pharmacy, dose given in the ED. Instructed pt to hydrate well at home, follow up with neurology if symptoms persist. Therapy and psychiatry referral also given. Return precautions given, including for worsening confusion or other neuro deficits, persistent symptoms, fevers, or any other acute symptoms or concerns. All conversations held with ED  at bedside. pt and family agree to plan of care 59 year old female pt presented tot he ED for confusion, memory loss with last known well at 630 AM. No neuro deficits. Ct head/stroke scans were negative. Pt a & o x 4 in the ED, answering questions appropriately. Labs unremarkable, ua shows + uti. Started on vantin. Given symptoms, no neuro deficits, and + uti, symptoms likely secondary to delirium from Uti rather than neurological source. Pt also with recent traumatic loss of brother, may be having grief response to this. No SI/HI. had at length discussion with family and pt. Low suspicion for neuro cause at this time. Case was discussed with stroke neurologist as well, dr. marroquin. Vantin sent to pharmacy, dose given in the ED. Instructed pt to hydrate well at home, follow up with neurology if symptoms persist. Therapy and psychiatry referral also given. Return precautions given, including for worsening confusion or other neuro deficits, persistent symptoms, fevers, or any other acute symptoms or concerns. All conversations held with ED  at bedside. pt and family agree to plan of care

## 2025-01-07 NOTE — ED PROVIDER NOTE - OBJECTIVE STATEMENT
59 year old female pt with hx of HTN, lupus, on cellcept presents to the ED c/o confusion. As per pts , pt was well at 630 am this ,morning, later on in the morning pt began to exhibit memory loss, confusion, did not remember the events from the prior day. no head strike. no recent fever, chills, or cough. No prior hx of stroke. No blood thinner use. Code stroke activated in the ED

## 2025-01-07 NOTE — ED PROVIDER NOTE - NSICDXPASTSURGICALHX_GEN_ALL_CORE_FT
PAST SURGICAL HISTORY:  Female cystocele     Glaucoma     H/O abdominoplasty     H/O bilateral breast implants     H/O vaginal hysterectomy 2012    S/P breast lumpectomy     S/P LASIK surgery

## 2025-01-07 NOTE — ED PROVIDER NOTE - PHYSICAL EXAMINATION
Const: Pt is well appearing. Awake, alert and oriented to person, place, & time. In no acute distress.   HEENT: NC/AT. TM's clear bilaterally. Oropharynx clear without exudates or erythema. Moist mucous membranes  Eyes: PERRLA. No scleral icterus. EOMI.  Neck:. Soft and supple. Full ROM without pain. No cervical midline tenderness.   Cardiac: Regular rate and regular rhythm. +S1/S2. No murmurs, rubs or gallops. Peripheral pulses 2+ and symmetric. No LE edema.  Resp: Speaking in full sentences, breath sounds equal and clear bilaterally. No wheezes, rales or rhonchi.  Abd: Soft, non-tender, non-distended.  No guarding or rebound.  MSK: Spine midline and non-tender.  Skin: No rashes, abrasions or lacerations.  Neuro: Moving all extremities symmetrically, follows commands, motor aster upper and lower ext 5/5, sensory symm and intact CN 2-12 grossly intact, no ataxia, no nystagmus, no dysmetria, no ddk, symm aster, no pronator drift

## 2025-01-07 NOTE — ED PROVIDER NOTE - PROGRESS NOTE DETAILS
I, Sam Tierney MD, personally saw the patient with the resident, and completed the key components of the history and physical exam. I then discussed the management plan with the resident.  Patient with a past medical history of lupus on mycophenolate, migraines is presenting for episode of confusion.  Patient last known well was 6:30 AM this morning.  Daughter states that she went to check on her today patient was very confused.  States that she was unable to remember what happened yesterday capacity questions about what is happening.  No history of similar episodes.  Patient with mild headache.  No chest pain or shortness of breath.  No nausea or vomiting.  Patient started taking Seroquel 1 week ago but otherwise no change to medication.  Not on anticoagulation.  On exam here her NIH stroke scale 0 but she is having some confusion with unable to remember events from yesterday.  Otherwise following commands.  Given she is within 24 hours of symptom onset, will make code stroke.  However with low NIH stroke scale and no measurable deficit at this time, will hold off on tenecteplase.  She is also out of the window based on her last known well.  Concern for possible transient global amnesia as a cause of her symptoms.  No history of this.  With mild headache, also possibility of migraine.  Less likely seizure as well.  Plan on lab work, imaging and reassessment. utilize ed  mya. Spoke with attending radiologist., No acute stroke Spoke with pts daughter, pt had loss of brother 2 months ago (traumatic death) and has been having "hallucinations" of him over past few weeks

## 2025-01-07 NOTE — ED PROVIDER NOTE - PATIENT PORTAL LINK FT
You can access the FollowMyHealth Patient Portal offered by [facility] by registering at the following website: [email or web address]. By joining [facility]’s FollowMyHealth portal, you will also be able to view your health information using other applications (apps) compatible with our system.

## 2025-01-07 NOTE — ED PROVIDER NOTE - CARE PROVIDERS DIRECT ADDRESSES
,[email or web address]

## 2025-01-07 NOTE — ED PROVIDER NOTE - ATTENDING CONTRIBUTION TO CARE
I, Sam Tierney MD, personally saw the patient with the resident, and completed the key components of the history and physical exam. I then discussed the management plan with the resident.  Patient with a past medical history of lupus on mycophenolate, migraines is presenting for episode of confusion.  Patient last known well was 6:30 AM this morning.  Daughter states that she went to check on her today patient was very confused.  States that she was unable to remember what happened yesterday capacity questions about what is happening.  No history of similar episodes.  Patient with mild headache.  No chest pain or shortness of breath.  No nausea or vomiting.  Patient started taking Seroquel 1 week ago but otherwise no change to medication.  Not on anticoagulation.  On exam here her NIH stroke scale 0 but she is having some confusion with unable to remember events from yesterday.  Otherwise following commands.  Given she is within 24 hours of symptom onset, will make code stroke.  However with low NIH stroke scale and no measurable deficit at this time, will hold off on tenecteplase.  She is also out of the window based on her last known well.  Concern for possible transient global amnesia as a cause of her symptoms.  No history of this.  With mild headache, also possibility of migraine.  Less likely seizure as well.  Plan on lab work, imaging and reassessment. utilize ed  mya.    Upon my evaluation, this patient has a high probability of imminent or life-threatening deterioration which required my direct attention, intervention and personal management.  I have personally provided the amount of critical care time documented above excluding time spent on separate procedures. Elements for critical care include direct patient care (not related to procedure), additional history taking, interpretation of diagnostic studies, documentation, consultation with other physicians.

## 2025-01-07 NOTE — ED ADULT TRIAGE NOTE - CHIEF COMPLAINT QUOTE
dgtr states mom called her 11am today , very confused, no recall of yesterday, repeating herself  A&Ox3, resp wnl, no HTN hx

## 2025-01-07 NOTE — ED PROVIDER NOTE - NSFOLLOWUPINSTRUCTIONS_ED_ALL_ED_FT
[place] el antibiótico dos veces al día khloe los próximos 7 días. Asegúrese de hidratarse berhane, ya que esto le ayudará con la infección del tracto urinario. También tam un seguimiento con el neurólogo referido si iza síntomas de confusión persisten. Si necesita cambiar de reumatólogo, puede utilizar el médico recomendado a continuación. Utilice también los recursos disponibles para terapia/psiquiatría. Si comienza a experimentar un empeoramiento de la confusión, pérdida de memoria, fiebre, dolor abdominal, dificultad para hablar, cambios en la visión, dificultad para caminar o cualquier otro síntoma o inquietud greyson, regrese al servicio de urgencias.

## 2025-01-07 NOTE — ED PROVIDER NOTE - NSICDXPASTMEDICALHX_GEN_ALL_CORE_FT
PAST MEDICAL HISTORY:  2019 novel coronavirus disease (COVID-19) 9/12/21    Cervical spinal stenosis     Cystocele with prolapse     Dry eye syndrome     Glaucoma     ILD (interstitial lung disease)     Mass of buccal mucosa     Neuralgic migraines     Positive CURLY (antinuclear antibody)     Pulmonary fibrosis     Rectocele     Sjogren's disease

## 2025-01-07 NOTE — ED ADULT NURSE NOTE - OBJECTIVE STATEMENT
Pt daughter states she noticed her mother confused, asking  repetitive questions since this morning approx 11 am . Pt usually not confused , sharp and independent .  Code stroke activated upon arrival . Pt had negative CT , awaiting urine collection .

## 2025-01-08 ENCOUNTER — APPOINTMENT (OUTPATIENT)
Dept: RHEUMATOLOGY | Facility: CLINIC | Age: 60
End: 2025-01-08

## 2025-01-21 ENCOUNTER — OFFICE (OUTPATIENT)
Dept: URBAN - METROPOLITAN AREA CLINIC 116 | Facility: CLINIC | Age: 60
Setting detail: OPHTHALMOLOGY
End: 2025-01-21
Payer: COMMERCIAL

## 2025-01-21 DIAGNOSIS — Z96.1: ICD-10-CM

## 2025-01-21 DIAGNOSIS — M06.9: ICD-10-CM

## 2025-01-21 DIAGNOSIS — H26.493: ICD-10-CM

## 2025-01-21 DIAGNOSIS — H16.223: ICD-10-CM

## 2025-01-21 PROCEDURE — 92012 INTRM OPH EXAM EST PATIENT: CPT | Performed by: PHYSICIAN ASSISTANT

## 2025-01-21 ASSESSMENT — TONOMETRY
OS_IOP_MMHG: 20
OD_IOP_MMHG: 21

## 2025-01-21 ASSESSMENT — REFRACTION_CURRENTRX
OS_AXIS: 000
OD_AXIS: 000
OS_AXIS: 000
OD_CYLINDER: 0.00
OD_OVR_VA: 20/
OD_ADD: +2.50
OD_ADD: +3.00
OS_CYLINDER: 0.00
OS_SPHERE: +1.00
OS_CYLINDER: 0.00
OS_SPHERE: +1.00
OD_SPHERE: +1.25
OD_AXIS: 000
OS_VPRISM_DIRECTION: PROGS
OS_VPRISM_DIRECTION: SV
OD_SPHERE: +1.25
OS_ADD: +3.00
OD_VPRISM_DIRECTION: PROGS
OS_OVR_VA: 20/
OD_CYLINDER: 0.00
OS_ADD: +2.75
OD_VPRISM_DIRECTION: SV
OD_OVR_VA: 20/
OS_OVR_VA: 20/

## 2025-01-21 ASSESSMENT — REFRACTION_AUTOREFRACTION
OS_CYLINDER: -0.25
OS_AXIS: 165
OD_AXIS: 175
OD_CYLINDER: -0.75
OS_SPHERE: +0.25
OD_SPHERE: +0.50

## 2025-01-21 ASSESSMENT — LID EXAM ASSESSMENTS
OS_COMMENTS: TATTOO ON SUPERIOR & INFERIOR LIDS
OD_COMMENTS: TATTOO ON SUPERIOR & INFERIOR LIDS

## 2025-01-21 ASSESSMENT — REFRACTION_MANIFEST
OD_VA1: 20/150
OD_CYLINDER: 0.00
OS_VA1: 20/200
OD_CYLINDER: 0.00
OS_AXIS: 107
OD_VA1: 20/150
OD_AXIS: 000
OS_CYLINDER: -0.25
OS_VA1: 20/25
OD_AXIS: 000
OS_SPHERE: +1.50
OS_SPHERE: -0.25
OS_AXIS: 005
OD_SPHERE: +2.00
OS_CYLINDER: -0.50
OD_SPHERE: +2.00

## 2025-01-21 ASSESSMENT — KERATOMETRY
METHOD_AUTO_MANUAL: AUTO
OS_AXISANGLE_DEGREES: 080
OS_K2POWER_DIOPTERS: 42.50
OD_K2POWER_DIOPTERS: 42.50
OD_K1POWER_DIOPTERS: 41.50
OS_K1POWER_DIOPTERS: 41.50
OD_AXISANGLE_DEGREES: 090

## 2025-01-21 ASSESSMENT — SUPERFICIAL PUNCTATE KERATITIS (SPK)
OD_SPK: 2+
OS_SPK: 2+

## 2025-01-21 ASSESSMENT — CONFRONTATIONAL VISUAL FIELD TEST (CVF)
OS_FINDINGS: FULL
OD_FINDINGS: FULL

## 2025-01-21 ASSESSMENT — VISUAL ACUITY
OS_BCVA: 20/20+1
OD_BCVA: 20/25

## 2025-01-28 ENCOUNTER — APPOINTMENT (OUTPATIENT)
Dept: INTERNAL MEDICINE | Facility: CLINIC | Age: 60
End: 2025-01-28
Payer: MEDICARE

## 2025-01-28 VITALS
SYSTOLIC BLOOD PRESSURE: 114 MMHG | DIASTOLIC BLOOD PRESSURE: 78 MMHG | HEIGHT: 60.5 IN | WEIGHT: 163 LBS | RESPIRATION RATE: 16 BRPM | HEART RATE: 77 BPM | BODY MASS INDEX: 31.18 KG/M2

## 2025-01-28 DIAGNOSIS — M62.838 OTHER MUSCLE SPASM: ICD-10-CM

## 2025-01-28 DIAGNOSIS — N39.0 INFECTION AND INFLAMMATORY REACTION DUE TO INDWELLING URETHRAL CATHETER, SEQUELA: ICD-10-CM

## 2025-01-28 DIAGNOSIS — F41.9 ANXIETY DISORDER, UNSPECIFIED: ICD-10-CM

## 2025-01-28 DIAGNOSIS — T83.511S INFECTION AND INFLAMMATORY REACTION DUE TO INDWELLING URETHRAL CATHETER, SEQUELA: ICD-10-CM

## 2025-01-28 PROCEDURE — 99214 OFFICE O/P EST MOD 30 MIN: CPT

## 2025-01-28 PROCEDURE — G2211 COMPLEX E/M VISIT ADD ON: CPT

## 2025-01-28 RX ORDER — PAROXETINE HYDROCHLORIDE 10 MG/1
10 TABLET, FILM COATED ORAL DAILY
Qty: 90 | Refills: 2 | Status: ACTIVE | COMMUNITY
Start: 2025-01-28 | End: 1900-01-01

## 2025-01-29 LAB
APPEARANCE: CLEAR
BACTERIA: NEGATIVE /HPF
BILIRUBIN URINE: NEGATIVE
BLOOD URINE: NEGATIVE
CAST: 0 /LPF
COLOR: YELLOW
EPITHELIAL CELLS: 1 /HPF
GLUCOSE QUALITATIVE U: NEGATIVE MG/DL
KETONES URINE: NEGATIVE MG/DL
LEUKOCYTE ESTERASE URINE: NEGATIVE
MICROSCOPIC-UA: NORMAL
NITRITE URINE: NEGATIVE
PH URINE: 6
PROTEIN URINE: NEGATIVE MG/DL
RED BLOOD CELLS URINE: 2 /HPF
SPECIFIC GRAVITY URINE: 1.02
UROBILINOGEN URINE: 0.2 MG/DL
WHITE BLOOD CELLS URINE: 1 /HPF

## 2025-01-30 LAB — BACTERIA UR CULT: NORMAL

## 2025-02-03 ENCOUNTER — TRANSCRIPTION ENCOUNTER (OUTPATIENT)
Age: 60
End: 2025-02-03

## 2025-02-19 ENCOUNTER — APPOINTMENT (OUTPATIENT)
Dept: RHEUMATOLOGY | Facility: CLINIC | Age: 60
End: 2025-02-19
Payer: MEDICARE

## 2025-02-19 VITALS
WEIGHT: 163 LBS | BODY MASS INDEX: 31.18 KG/M2 | HEIGHT: 60.5 IN | HEART RATE: 92 BPM | TEMPERATURE: 98.1 F | SYSTOLIC BLOOD PRESSURE: 100 MMHG | DIASTOLIC BLOOD PRESSURE: 80 MMHG | OXYGEN SATURATION: 95 %

## 2025-02-19 DIAGNOSIS — Z78.0 OTHER SPECIFIED DISORDERS OF BONE DENSITY AND STRUCTURE, UNSPECIFIED SITE: ICD-10-CM

## 2025-02-19 DIAGNOSIS — J84.9 INTERSTITIAL PULMONARY DISEASE, UNSPECIFIED: ICD-10-CM

## 2025-02-19 DIAGNOSIS — M67.912 UNSPECIFIED DISORDER OF SYNOVIUM AND TENDON, LEFT SHOULDER: ICD-10-CM

## 2025-02-19 DIAGNOSIS — M85.80 OTHER SPECIFIED DISORDERS OF BONE DENSITY AND STRUCTURE, UNSPECIFIED SITE: ICD-10-CM

## 2025-02-19 DIAGNOSIS — M35.00 SICCA SYNDROME, UNSPECIFIED: ICD-10-CM

## 2025-02-19 DIAGNOSIS — Z79.624 LONG TERM (CURRENT) USE OF INHIBITORS OF NUCLEOTIDE SYNTHESIS: ICD-10-CM

## 2025-02-19 PROCEDURE — 99214 OFFICE O/P EST MOD 30 MIN: CPT

## 2025-02-19 PROCEDURE — G2211 COMPLEX E/M VISIT ADD ON: CPT

## 2025-02-19 RX ORDER — DICLOFENAC SODIUM 50 MG/1
50 TABLET, DELAYED RELEASE ORAL
Qty: 20 | Refills: 1 | Status: ACTIVE | COMMUNITY
Start: 2025-02-19 | End: 1900-01-01

## 2025-02-20 LAB
25(OH)D3 SERPL-MCNC: 34.5 NG/ML
ALBUMIN SERPL ELPH-MCNC: 4.4 G/DL
ALP BLD-CCNC: 136 U/L
ALT SERPL-CCNC: 33 U/L
ANION GAP SERPL CALC-SCNC: 12 MMOL/L
AST SERPL-CCNC: 37 U/L
BASOPHILS # BLD AUTO: 0.05 K/UL
BASOPHILS NFR BLD AUTO: 0.6 %
BILIRUB SERPL-MCNC: 0.3 MG/DL
BUN SERPL-MCNC: 14 MG/DL
CALCIUM SERPL-MCNC: 10.4 MG/DL
CHLORIDE SERPL-SCNC: 103 MMOL/L
CO2 SERPL-SCNC: 25 MMOL/L
CREAT SERPL-MCNC: 0.81 MG/DL
CRP SERPL-MCNC: 3 MG/L
EGFR: 83 ML/MIN/1.73M2
EOSINOPHIL # BLD AUTO: 0.23 K/UL
EOSINOPHIL NFR BLD AUTO: 2.5 %
ERYTHROCYTE [SEDIMENTATION RATE] IN BLOOD BY WESTERGREN METHOD: 45 MM/HR
GLUCOSE SERPL-MCNC: 82 MG/DL
HCT VFR BLD CALC: 40.9 %
HGB BLD-MCNC: 14 G/DL
IMM GRANULOCYTES NFR BLD AUTO: 0.6 %
LYMPHOCYTES # BLD AUTO: 2.7 K/UL
LYMPHOCYTES NFR BLD AUTO: 29.7 %
MAN DIFF?: NORMAL
MCHC RBC-ENTMCNC: 30 PG
MCHC RBC-ENTMCNC: 34.2 G/DL
MCV RBC AUTO: 87.8 FL
MONOCYTES # BLD AUTO: 0.58 K/UL
MONOCYTES NFR BLD AUTO: 6.4 %
NEUTROPHILS # BLD AUTO: 5.48 K/UL
NEUTROPHILS NFR BLD AUTO: 60.2 %
PLATELET # BLD AUTO: 300 K/UL
POTASSIUM SERPL-SCNC: 4.7 MMOL/L
PROT SERPL-MCNC: 7.4 G/DL
RBC # BLD: 4.66 M/UL
RBC # FLD: 13.7 %
SODIUM SERPL-SCNC: 141 MMOL/L
WBC # FLD AUTO: 9.09 K/UL

## 2025-02-24 PROBLEM — Z79.624 ENCOUNTER FOR LONG TERM USE OF MYCOPHENOLATE MOFETIL: Status: ACTIVE | Noted: 2021-06-07

## 2025-03-12 ENCOUNTER — APPOINTMENT (OUTPATIENT)
Dept: INTERNAL MEDICINE | Facility: CLINIC | Age: 60
End: 2025-03-12
Payer: MEDICARE

## 2025-03-12 ENCOUNTER — APPOINTMENT (OUTPATIENT)
Dept: INTERNAL MEDICINE | Facility: CLINIC | Age: 60
End: 2025-03-12

## 2025-03-12 PROCEDURE — 36415 COLL VENOUS BLD VENIPUNCTURE: CPT

## 2025-03-13 LAB
25(OH)D3 SERPL-MCNC: 26.2 NG/ML
ALBUMIN SERPL ELPH-MCNC: 4.3 G/DL
ALP BLD-CCNC: 140 U/L
ALT SERPL-CCNC: 28 U/L
ANION GAP SERPL CALC-SCNC: 11 MMOL/L
APPEARANCE: CLEAR
AST SERPL-CCNC: 23 U/L
BACTERIA: NEGATIVE /HPF
BASOPHILS # BLD AUTO: 0.06 K/UL
BASOPHILS NFR BLD AUTO: 0.8 %
BILIRUB SERPL-MCNC: 0.3 MG/DL
BILIRUBIN URINE: NEGATIVE
BLOOD URINE: NEGATIVE
BUN SERPL-MCNC: 14 MG/DL
CALCIUM SERPL-MCNC: 9.6 MG/DL
CAST: 0 /LPF
CHLORIDE SERPL-SCNC: 105 MMOL/L
CHOLEST SERPL-MCNC: 253 MG/DL
CO2 SERPL-SCNC: 26 MMOL/L
COLOR: YELLOW
CREAT SERPL-MCNC: 0.78 MG/DL
CREAT SPEC-SCNC: 130 MG/DL
EGFRCR SERPLBLD CKD-EPI 2021: 86 ML/MIN/1.73M2
EOSINOPHIL # BLD AUTO: 0.27 K/UL
EOSINOPHIL NFR BLD AUTO: 3.8 %
EPITHELIAL CELLS: 3 /HPF
ESTIMATED AVERAGE GLUCOSE: 111 MG/DL
GLUCOSE QUALITATIVE U: NEGATIVE MG/DL
GLUCOSE SERPL-MCNC: 87 MG/DL
HBA1C MFR BLD HPLC: 5.5 %
HCT VFR BLD CALC: 45.7 %
HDLC SERPL-MCNC: 38 MG/DL
HGB BLD-MCNC: 14.2 G/DL
IMM GRANULOCYTES NFR BLD AUTO: 0.7 %
KETONES URINE: NEGATIVE MG/DL
LDLC SERPL CALC-MCNC: 158 MG/DL
LEUKOCYTE ESTERASE URINE: ABNORMAL
LYMPHOCYTES # BLD AUTO: 2.07 K/UL
LYMPHOCYTES NFR BLD AUTO: 29.3 %
MAN DIFF?: NORMAL
MCHC RBC-ENTMCNC: 30 PG
MCHC RBC-ENTMCNC: 31.1 G/DL
MCV RBC AUTO: 96.6 FL
MICROALBUMIN 24H UR DL<=1MG/L-MCNC: <1.2 MG/DL
MICROALBUMIN/CREAT 24H UR-RTO: NORMAL MG/G
MICROSCOPIC-UA: NORMAL
MONOCYTES # BLD AUTO: 0.43 K/UL
MONOCYTES NFR BLD AUTO: 6.1 %
NEUTROPHILS # BLD AUTO: 4.18 K/UL
NEUTROPHILS NFR BLD AUTO: 59.3 %
NITRITE URINE: NEGATIVE
NONHDLC SERPL-MCNC: 215 MG/DL
PH URINE: 5.5
PLATELET # BLD AUTO: 297 K/UL
POTASSIUM SERPL-SCNC: 4.5 MMOL/L
PROT SERPL-MCNC: 7.3 G/DL
PROTEIN URINE: NEGATIVE MG/DL
RBC # BLD: 4.73 M/UL
RBC # FLD: 14.2 %
RED BLOOD CELLS URINE: 2 /HPF
REVIEW: NORMAL
SODIUM SERPL-SCNC: 142 MMOL/L
SPECIFIC GRAVITY URINE: 1.02
T4 FREE SERPL-MCNC: 1.1 NG/DL
TRIGL SERPL-MCNC: 303 MG/DL
TSH SERPL-ACNC: 2.83 UIU/ML
UROBILINOGEN URINE: 0.2 MG/DL
WBC # FLD AUTO: 7.06 K/UL
WHITE BLOOD CELLS URINE: 0 /HPF

## 2025-03-19 ENCOUNTER — NON-APPOINTMENT (OUTPATIENT)
Age: 60
End: 2025-03-19

## 2025-03-19 ENCOUNTER — APPOINTMENT (OUTPATIENT)
Dept: INTERNAL MEDICINE | Facility: CLINIC | Age: 60
End: 2025-03-19
Payer: MEDICARE

## 2025-03-19 VITALS
HEART RATE: 78 BPM | HEIGHT: 60.5 IN | RESPIRATION RATE: 12 BRPM | BODY MASS INDEX: 31.18 KG/M2 | SYSTOLIC BLOOD PRESSURE: 112 MMHG | DIASTOLIC BLOOD PRESSURE: 78 MMHG | WEIGHT: 163 LBS

## 2025-03-19 DIAGNOSIS — R06.83 SNORING: ICD-10-CM

## 2025-03-19 DIAGNOSIS — Z00.00 ENCOUNTER FOR GENERAL ADULT MEDICAL EXAMINATION W/OUT ABNORMAL FINDINGS: ICD-10-CM

## 2025-03-19 DIAGNOSIS — E78.2 MIXED HYPERLIPIDEMIA: ICD-10-CM

## 2025-03-19 PROCEDURE — G0537: CPT

## 2025-03-19 PROCEDURE — 93000 ELECTROCARDIOGRAM COMPLETE: CPT

## 2025-03-19 PROCEDURE — 99396 PREV VISIT EST AGE 40-64: CPT

## 2025-03-25 ENCOUNTER — APPOINTMENT (OUTPATIENT)
Dept: NEUROLOGY | Facility: CLINIC | Age: 60
End: 2025-03-25
Payer: MEDICARE

## 2025-03-25 VITALS
BODY MASS INDEX: 31.18 KG/M2 | WEIGHT: 163 LBS | HEART RATE: 82 BPM | DIASTOLIC BLOOD PRESSURE: 81 MMHG | HEIGHT: 60.5 IN | SYSTOLIC BLOOD PRESSURE: 135 MMHG

## 2025-03-25 DIAGNOSIS — G44.89 OTHER HEADACHE SYNDROME: ICD-10-CM

## 2025-03-25 PROCEDURE — 99213 OFFICE O/P EST LOW 20 MIN: CPT

## 2025-03-25 PROCEDURE — G2211 COMPLEX E/M VISIT ADD ON: CPT

## 2025-03-26 ENCOUNTER — APPOINTMENT (OUTPATIENT)
Dept: RADIOLOGY | Facility: CLINIC | Age: 60
End: 2025-03-26
Payer: MEDICARE

## 2025-03-26 ENCOUNTER — RESULT REVIEW (OUTPATIENT)
Age: 60
End: 2025-03-26

## 2025-03-26 ENCOUNTER — OUTPATIENT (OUTPATIENT)
Dept: OUTPATIENT SERVICES | Facility: HOSPITAL | Age: 60
LOS: 1 days | End: 2025-03-26
Payer: MEDICARE

## 2025-03-26 DIAGNOSIS — Z98.890 OTHER SPECIFIED POSTPROCEDURAL STATES: Chronic | ICD-10-CM

## 2025-03-26 DIAGNOSIS — Z98.82 BREAST IMPLANT STATUS: Chronic | ICD-10-CM

## 2025-03-26 DIAGNOSIS — M85.80 OTHER SPECIFIED DISORDERS OF BONE DENSITY AND STRUCTURE, UNSPECIFIED SITE: ICD-10-CM

## 2025-03-26 DIAGNOSIS — H40.9 UNSPECIFIED GLAUCOMA: Chronic | ICD-10-CM

## 2025-03-26 DIAGNOSIS — N81.10 CYSTOCELE, UNSPECIFIED: Chronic | ICD-10-CM

## 2025-03-26 PROCEDURE — 77063 BREAST TOMOSYNTHESIS BI: CPT | Mod: 26

## 2025-03-26 PROCEDURE — 77085 DXA BONE DENSITY AXL VRT FX: CPT | Mod: 26

## 2025-03-26 PROCEDURE — 77063 BREAST TOMOSYNTHESIS BI: CPT

## 2025-03-26 PROCEDURE — 77067 SCR MAMMO BI INCL CAD: CPT | Mod: 26

## 2025-03-26 PROCEDURE — 77085 DXA BONE DENSITY AXL VRT FX: CPT

## 2025-03-26 PROCEDURE — 77067 SCR MAMMO BI INCL CAD: CPT

## 2025-04-22 ENCOUNTER — OUTPATIENT (OUTPATIENT)
Dept: OUTPATIENT SERVICES | Facility: HOSPITAL | Age: 60
LOS: 1 days | End: 2025-04-22
Payer: MEDICARE

## 2025-04-22 DIAGNOSIS — Z98.890 OTHER SPECIFIED POSTPROCEDURAL STATES: Chronic | ICD-10-CM

## 2025-04-22 DIAGNOSIS — Z98.82 BREAST IMPLANT STATUS: Chronic | ICD-10-CM

## 2025-04-22 DIAGNOSIS — G47.33 OBSTRUCTIVE SLEEP APNEA (ADULT) (PEDIATRIC): ICD-10-CM

## 2025-04-22 DIAGNOSIS — H40.9 UNSPECIFIED GLAUCOMA: Chronic | ICD-10-CM

## 2025-04-22 DIAGNOSIS — N81.10 CYSTOCELE, UNSPECIFIED: Chronic | ICD-10-CM

## 2025-04-22 DIAGNOSIS — Z90.710 ACQUIRED ABSENCE OF BOTH CERVIX AND UTERUS: Chronic | ICD-10-CM

## 2025-04-22 PROCEDURE — 95800 SLP STDY UNATTENDED: CPT | Mod: 52

## 2025-04-22 PROCEDURE — 95800 SLP STDY UNATTENDED: CPT | Mod: 26

## 2025-05-07 ENCOUNTER — APPOINTMENT (OUTPATIENT)
Dept: PULMONOLOGY | Facility: CLINIC | Age: 60
End: 2025-05-07
Payer: MEDICARE

## 2025-05-07 VITALS
WEIGHT: 143 LBS | BODY MASS INDEX: 27.35 KG/M2 | HEIGHT: 60.5 IN | RESPIRATION RATE: 14 BRPM | OXYGEN SATURATION: 95 % | DIASTOLIC BLOOD PRESSURE: 82 MMHG | SYSTOLIC BLOOD PRESSURE: 134 MMHG | HEART RATE: 74 BPM

## 2025-05-07 DIAGNOSIS — G47.33 OBSTRUCTIVE SLEEP APNEA (ADULT) (PEDIATRIC): ICD-10-CM

## 2025-05-07 DIAGNOSIS — Z71.89 OTHER SPECIFIED COUNSELING: ICD-10-CM

## 2025-05-07 DIAGNOSIS — R06.83 SNORING: ICD-10-CM

## 2025-05-07 PROCEDURE — G2211 COMPLEX E/M VISIT ADD ON: CPT

## 2025-05-07 PROCEDURE — 99215 OFFICE O/P EST HI 40 MIN: CPT

## 2025-06-02 ENCOUNTER — APPOINTMENT (OUTPATIENT)
Dept: PULMONOLOGY | Facility: CLINIC | Age: 60
End: 2025-06-02
Payer: MEDICARE

## 2025-06-02 VITALS
OXYGEN SATURATION: 97 % | SYSTOLIC BLOOD PRESSURE: 132 MMHG | DIASTOLIC BLOOD PRESSURE: 84 MMHG | RESPIRATION RATE: 16 BRPM | HEART RATE: 73 BPM

## 2025-06-02 PROCEDURE — G2211 COMPLEX E/M VISIT ADD ON: CPT

## 2025-06-02 PROCEDURE — 99213 OFFICE O/P EST LOW 20 MIN: CPT

## 2025-06-25 ENCOUNTER — APPOINTMENT (OUTPATIENT)
Dept: RHEUMATOLOGY | Facility: CLINIC | Age: 60
End: 2025-06-25
Payer: MEDICARE

## 2025-06-25 VITALS — OXYGEN SATURATION: 90 % | HEART RATE: 61 BPM | SYSTOLIC BLOOD PRESSURE: 126 MMHG | DIASTOLIC BLOOD PRESSURE: 70 MMHG

## 2025-06-25 PROCEDURE — G2211 COMPLEX E/M VISIT ADD ON: CPT

## 2025-06-25 PROCEDURE — 99215 OFFICE O/P EST HI 40 MIN: CPT

## 2025-06-26 PROBLEM — M85.80 OSTEOPENIA AFTER MENOPAUSE: Status: RESOLVED | Noted: 2023-06-08 | Resolved: 2025-06-26

## 2025-06-26 PROBLEM — M81.0 POSTMENOPAUSAL OSTEOPOROSIS: Status: ACTIVE | Noted: 2025-06-26

## 2025-06-26 PROBLEM — Z76.89 ENCOUNTER FOR NEW MEDICATION PRESCRIPTION: Status: ACTIVE | Noted: 2025-06-26

## 2025-06-26 LAB
25(OH)D3 SERPL-MCNC: 24.4 NG/ML
ALBUMIN SERPL ELPH-MCNC: 4.3 G/DL
ALP BLD-CCNC: 129 U/L
ALT SERPL-CCNC: 43 U/L
ANION GAP SERPL CALC-SCNC: 13 MMOL/L
AST SERPL-CCNC: 35 U/L
BASOPHILS # BLD AUTO: 0.06 K/UL
BASOPHILS NFR BLD AUTO: 0.8 %
BILIRUB SERPL-MCNC: 0.2 MG/DL
BUN SERPL-MCNC: 14 MG/DL
CALCIUM SERPL-MCNC: 9.8 MG/DL
CHLORIDE SERPL-SCNC: 105 MMOL/L
CO2 SERPL-SCNC: 24 MMOL/L
CREAT SERPL-MCNC: 0.75 MG/DL
CRP SERPL-MCNC: 3 MG/L
EGFRCR SERPLBLD CKD-EPI 2021: 90 ML/MIN/1.73M2
EOSINOPHIL # BLD AUTO: 0.19 K/UL
EOSINOPHIL NFR BLD AUTO: 2.7 %
ERYTHROCYTE [SEDIMENTATION RATE] IN BLOOD BY WESTERGREN METHOD: 23 MM/HR
GLUCOSE SERPL-MCNC: 89 MG/DL
HCT VFR BLD CALC: 41.6 %
HGB BLD-MCNC: 13.4 G/DL
IMM GRANULOCYTES NFR BLD AUTO: 0.4 %
LYMPHOCYTES # BLD AUTO: 2.37 K/UL
LYMPHOCYTES NFR BLD AUTO: 33.3 %
MAN DIFF?: NORMAL
MCHC RBC-ENTMCNC: 30.5 PG
MCHC RBC-ENTMCNC: 32.2 G/DL
MCV RBC AUTO: 94.5 FL
MONOCYTES # BLD AUTO: 0.48 K/UL
MONOCYTES NFR BLD AUTO: 6.8 %
NEUTROPHILS # BLD AUTO: 3.98 K/UL
NEUTROPHILS NFR BLD AUTO: 56 %
PLATELET # BLD AUTO: 295 K/UL
POTASSIUM SERPL-SCNC: 4.9 MMOL/L
PROT SERPL-MCNC: 7.2 G/DL
RBC # BLD: 4.4 M/UL
RBC # FLD: 13.5 %
SODIUM SERPL-SCNC: 141 MMOL/L
WBC # FLD AUTO: 7.11 K/UL

## 2025-06-30 ENCOUNTER — APPOINTMENT (OUTPATIENT)
Dept: ORTHOPEDIC SURGERY | Facility: CLINIC | Age: 60
End: 2025-06-30
Payer: MEDICARE

## 2025-06-30 ENCOUNTER — APPOINTMENT (OUTPATIENT)
Dept: INTERNAL MEDICINE | Facility: CLINIC | Age: 60
End: 2025-06-30
Payer: MEDICARE

## 2025-06-30 ENCOUNTER — APPOINTMENT (OUTPATIENT)
Dept: INTERNAL MEDICINE | Facility: CLINIC | Age: 60
End: 2025-06-30

## 2025-06-30 VITALS — BODY MASS INDEX: 25.76 KG/M2 | HEIGHT: 62 IN | WEIGHT: 140 LBS

## 2025-06-30 VITALS
DIASTOLIC BLOOD PRESSURE: 82 MMHG | BODY MASS INDEX: 26.78 KG/M2 | SYSTOLIC BLOOD PRESSURE: 116 MMHG | WEIGHT: 140 LBS | HEIGHT: 60.5 IN

## 2025-06-30 PROBLEM — M54.50 ACUTE BILATERAL LOW BACK PAIN WITHOUT SCIATICA: Status: ACTIVE | Noted: 2025-06-30

## 2025-06-30 PROBLEM — M25.512 LEFT SHOULDER PAIN: Status: ACTIVE | Noted: 2025-06-30

## 2025-06-30 PROCEDURE — 73030 X-RAY EXAM OF SHOULDER: CPT | Mod: LT

## 2025-06-30 PROCEDURE — 99214 OFFICE O/P EST MOD 30 MIN: CPT

## 2025-06-30 PROCEDURE — 81003 URINALYSIS AUTO W/O SCOPE: CPT | Mod: QW

## 2025-06-30 PROCEDURE — G2211 COMPLEX E/M VISIT ADD ON: CPT

## 2025-06-30 PROCEDURE — 99213 OFFICE O/P EST LOW 20 MIN: CPT

## 2025-06-30 RX ORDER — CYCLOBENZAPRINE HYDROCHLORIDE 5 MG/1
5 TABLET, FILM COATED ORAL
Qty: 30 | Refills: 2 | Status: ACTIVE | COMMUNITY
Start: 2025-06-30 | End: 1900-01-01

## 2025-06-30 RX ORDER — METHYLPREDNISOLONE 4 MG/1
4 TABLET ORAL
Qty: 1 | Refills: 0 | Status: ACTIVE | COMMUNITY
Start: 2025-06-30 | End: 1900-01-01

## 2025-07-02 ENCOUNTER — APPOINTMENT (OUTPATIENT)
Dept: PULMONOLOGY | Facility: CLINIC | Age: 60
End: 2025-07-02

## 2025-07-02 VITALS
SYSTOLIC BLOOD PRESSURE: 112 MMHG | BODY MASS INDEX: 25.76 KG/M2 | HEIGHT: 62 IN | DIASTOLIC BLOOD PRESSURE: 70 MMHG | RESPIRATION RATE: 16 BRPM | HEART RATE: 68 BPM | WEIGHT: 140 LBS | OXYGEN SATURATION: 95 %

## 2025-07-02 PROCEDURE — 99212 OFFICE O/P EST SF 10 MIN: CPT

## 2025-07-03 LAB — BACTERIA UR CULT: ABNORMAL

## 2025-09-15 ENCOUNTER — APPOINTMENT (OUTPATIENT)
Dept: PULMONOLOGY | Facility: CLINIC | Age: 60
End: 2025-09-15
Payer: MEDICARE

## 2025-09-19 ENCOUNTER — APPOINTMENT (OUTPATIENT)
Dept: PULMONOLOGY | Facility: CLINIC | Age: 60
End: 2025-09-19
Payer: MEDICARE

## 2025-09-19 VITALS
SYSTOLIC BLOOD PRESSURE: 130 MMHG | OXYGEN SATURATION: 95 % | DIASTOLIC BLOOD PRESSURE: 80 MMHG | HEART RATE: 84 BPM | RESPIRATION RATE: 16 BRPM

## 2025-09-19 VITALS — HEIGHT: 62 IN | BODY MASS INDEX: 24.66 KG/M2 | WEIGHT: 134 LBS

## 2025-09-19 DIAGNOSIS — G47.33 OBSTRUCTIVE SLEEP APNEA (ADULT) (PEDIATRIC): ICD-10-CM

## 2025-09-19 PROCEDURE — 99215 OFFICE O/P EST HI 40 MIN: CPT

## 2025-09-19 PROCEDURE — G2211 COMPLEX E/M VISIT ADD ON: CPT

## (undated) DEVICE — ELCTR GROUNDING PAD ADULT COVIDIEN

## (undated) DEVICE — XI FORCEP TENACULUM

## (undated) DEVICE — ELCTR CORD FOOTSWITCH 1PLR LAPSCP 10FT

## (undated) DEVICE — VENODYNE/SCD SLEEVE CALF MEDIUM

## (undated) DEVICE — TUBING STRYKEFLOW II SUCTION / IRRIGATOR

## (undated) DEVICE — NDL HYPO REGULAR BEVEL 25G X 1.5" (BLUE)

## (undated) DEVICE — DRAPE SPLIT SHEET 77" X 120"

## (undated) DEVICE — SUT MONOCRYL 4-0 27" PS-2 UNDYED

## (undated) DEVICE — XI DRAPE COLUMN

## (undated) DEVICE — LONE STAR RETRACTOR RING 32.5CM X 18.3CM DISP

## (undated) DEVICE — GOWN XXXL

## (undated) DEVICE — BLANKET WARMER UPPER ADULT

## (undated) DEVICE — SUT VICRYL 4-0 18" PS-2 UNDYED

## (undated) DEVICE — DRAPE GYN W LEGGINGS 3X125"

## (undated) DEVICE — SUT ETHIBOND 0 36" SH DA

## (undated) DEVICE — COVER TIP INTUITIVE W INSERTION TOOL

## (undated) DEVICE — TUBE TRI-LUMEN FILT USE W AIRSEAL

## (undated) DEVICE — TIP SCISSOR ENDO CUT

## (undated) DEVICE — SOL IRR POUR NS 0.9% 1000ML

## (undated) DEVICE — SUT VLOC 180 2-0 6" GS-22 GREEN

## (undated) DEVICE — XI NDL DRIVER SUTURECUT MEGA 8MM

## (undated) DEVICE — PACK ROBOTIC

## (undated) DEVICE — GOWN LG

## (undated) DEVICE — DRAPE 3/4 SHEET 52X76"

## (undated) DEVICE — SUT VICRYL 3-0 18" PS-2 UNDYED

## (undated) DEVICE — ELCTR BOVIE TIP BLADE INSULATED 2.8" EDGE WITH SAFETY

## (undated) DEVICE — WRAP COMPRESSION CALF MED

## (undated) DEVICE — ENDOCATCH 10MM SPECIMEN POUCH

## (undated) DEVICE — POSITIONER FOAM EGG CRATE ULNAR 2PCS (PINK)

## (undated) DEVICE — TROCAR SURGIQUEST AIRSEAL 8MMX100MM

## (undated) DEVICE — SUT VICRYL 2-0 27" SH UNDYED

## (undated) DEVICE — PREP BETADINE KIT

## (undated) DEVICE — XI OBTURATOR OPTICAL BLADELESS 8MM

## (undated) DEVICE — SOL IRR POUR H2O 1000ML

## (undated) DEVICE — XI DRAPE ARM

## (undated) DEVICE — XI SEAL UNIV 5- 8 MM

## (undated) DEVICE — DRAPE ROBOTIC

## (undated) DEVICE — GLV 7 PROTEXIS (WHITE)

## (undated) DEVICE — SUT ETHILON 4-0 18" PS-2 UNDYED

## (undated) DEVICE — SUT GORETEX CV-2 (0) 36" THX-26

## (undated) DEVICE — PREP CHLORAPREP ORANGE 2PCT 26ML

## (undated) DEVICE — DRAPE TOWEL BLUE 17" X 24"

## (undated) DEVICE — LONE STAR ELASTIC STAYS 5MM SHARP

## (undated) DEVICE — PACK MINOR WITH LAP

## (undated) DEVICE — POSITIONER PINK PAD PIGAZZI SYSTEM

## (undated) DEVICE — WARMING BLANKET LOWER ADULT

## (undated) DEVICE — TUBING CONNECTING 6MM 20FT

## (undated) DEVICE — ELCTR ROCKER SWITCH PENCIL BLUE 10FT

## (undated) DEVICE — SUT DERMABOND 0.7ML

## (undated) DEVICE — PACK MINOR NO DRAPE